# Patient Record
Sex: MALE | Race: WHITE | NOT HISPANIC OR LATINO | Employment: OTHER | URBAN - NONMETROPOLITAN AREA
[De-identification: names, ages, dates, MRNs, and addresses within clinical notes are randomized per-mention and may not be internally consistent; named-entity substitution may affect disease eponyms.]

---

## 2017-01-07 ENCOUNTER — COMMUNICATION - GICH (OUTPATIENT)
Dept: FAMILY MEDICINE | Facility: OTHER | Age: 62
End: 2017-01-07

## 2017-01-07 DIAGNOSIS — I25.10 ATHEROSCLEROTIC HEART DISEASE OF NATIVE CORONARY ARTERY WITHOUT ANGINA PECTORIS: ICD-10-CM

## 2017-01-25 ENCOUNTER — COMMUNICATION - GICH (OUTPATIENT)
Dept: FAMILY MEDICINE | Facility: OTHER | Age: 62
End: 2017-01-25

## 2017-01-25 DIAGNOSIS — I10 ESSENTIAL (PRIMARY) HYPERTENSION: ICD-10-CM

## 2017-01-25 DIAGNOSIS — E78.5 HYPERLIPIDEMIA: ICD-10-CM

## 2017-10-19 ENCOUNTER — COMMUNICATION - GICH (OUTPATIENT)
Dept: FAMILY MEDICINE | Facility: OTHER | Age: 62
End: 2017-10-19

## 2017-10-19 DIAGNOSIS — I10 ESSENTIAL (PRIMARY) HYPERTENSION: ICD-10-CM

## 2017-10-19 DIAGNOSIS — E78.5 HYPERLIPIDEMIA: ICD-10-CM

## 2017-12-14 ENCOUNTER — COMMUNICATION - GICH (OUTPATIENT)
Dept: FAMILY MEDICINE | Facility: OTHER | Age: 62
End: 2017-12-14

## 2017-12-14 DIAGNOSIS — I25.10 ATHEROSCLEROTIC HEART DISEASE OF NATIVE CORONARY ARTERY WITHOUT ANGINA PECTORIS: ICD-10-CM

## 2017-12-20 ENCOUNTER — OFFICE VISIT - GICH (OUTPATIENT)
Dept: FAMILY MEDICINE | Facility: OTHER | Age: 62
End: 2017-12-20

## 2017-12-20 ENCOUNTER — HISTORY (OUTPATIENT)
Dept: FAMILY MEDICINE | Facility: OTHER | Age: 62
End: 2017-12-20

## 2017-12-20 DIAGNOSIS — E78.5 HYPERLIPIDEMIA: ICD-10-CM

## 2017-12-20 DIAGNOSIS — Z23 ENCOUNTER FOR IMMUNIZATION: ICD-10-CM

## 2017-12-20 DIAGNOSIS — R73.01 IMPAIRED FASTING GLUCOSE: ICD-10-CM

## 2017-12-20 DIAGNOSIS — I10 ESSENTIAL (PRIMARY) HYPERTENSION: ICD-10-CM

## 2017-12-20 DIAGNOSIS — Z00.00 ENCOUNTER FOR GENERAL ADULT MEDICAL EXAMINATION WITHOUT ABNORMAL FINDINGS: ICD-10-CM

## 2017-12-20 DIAGNOSIS — I25.10 ATHEROSCLEROTIC HEART DISEASE OF NATIVE CORONARY ARTERY WITHOUT ANGINA PECTORIS: ICD-10-CM

## 2017-12-20 LAB
A/G RATIO - HISTORICAL: 1.7 (ref 1–2)
ALBUMIN SERPL-MCNC: 4.3 G/DL (ref 3.5–5.7)
ALP SERPL-CCNC: 57 IU/L (ref 34–104)
ALT (SGPT) - HISTORICAL: 42 IU/L (ref 7–52)
ANION GAP - HISTORICAL: 7 (ref 5–18)
AST SERPL-CCNC: 30 IU/L (ref 13–39)
BILIRUB SERPL-MCNC: 0.8 MG/DL (ref 0.3–1)
BUN SERPL-MCNC: 24 MG/DL (ref 7–25)
BUN/CREAT RATIO - HISTORICAL: 26
CALCIUM SERPL-MCNC: 9.1 MG/DL (ref 8.6–10.3)
CHLORIDE SERPLBLD-SCNC: 104 MMOL/L (ref 98–107)
CHOL/HDL RATIO - HISTORICAL: 2.93
CHOLESTEROL TOTAL: 135 MG/DL
CO2 SERPL-SCNC: 26 MMOL/L (ref 21–31)
CREAT SERPL-MCNC: 0.93 MG/DL (ref 0.7–1.3)
GFR IF NOT AFRICAN AMERICAN - HISTORICAL: >60 ML/MIN/1.73M2
GLOBULIN - HISTORICAL: 2.5 G/DL (ref 2–3.7)
GLUCOSE SERPL-MCNC: 117 MG/DL (ref 70–105)
HDLC SERPL-MCNC: 46 MG/DL (ref 23–92)
LDLC SERPL CALC-MCNC: 69 MG/DL
NON-HDL CHOLESTEROL - HISTORICAL: 89 MG/DL
POTASSIUM SERPL-SCNC: 4 MMOL/L (ref 3.5–5.1)
PROT SERPL-MCNC: 6.8 G/DL (ref 6.4–8.9)
PROVIDER ORDERDED STATUS - HISTORICAL: NORMAL
PSA TOTAL (DIAGNOSTIC) - HISTORICAL: 0.95 NG/ML
SODIUM SERPL-SCNC: 137 MMOL/L (ref 133–143)
TRIGL SERPL-MCNC: 98 MG/DL

## 2017-12-24 ENCOUNTER — COMMUNICATION - GICH (OUTPATIENT)
Dept: FAMILY MEDICINE | Facility: OTHER | Age: 62
End: 2017-12-24

## 2017-12-28 NOTE — TELEPHONE ENCOUNTER
Patient Information     Patient Name MRN Sex Kirk Rock [id number] Male 1955      Telephone Encounter by Matt Pearson RN at 10/23/2017 10:10 AM     Author:  Matt Pearson RN Service:  (none) Author Type:  NURS- Registered Nurse     Filed:  10/23/2017 10:41 AM Encounter Date:  10/19/2017 Status:  Signed     :  Matt Pearson RN (NURS- Registered Nurse)            Diuretic Combinations    Office visit in the past 12 months or per provider note.    Last visit with KAREN CRYSTAL was on: 2016 in [facility]  Next visit with KAREN CRYSTAL is on: No future appointment listed with this provider  Next visit with Family Practice is on: No future appointment listed in this department    Lab test requirements:  Creatinine and Potassium annually, if ordering lab, order BMP.  CREATININE (mg/dL)    Date Value   2016 0.97     POTASSIUM (mmol/L)    Date Value   2016 4.4     Max refill for 12 months from last office visit or per provider note.    Statins    Office visit in the past 12 months.    Last visit with KAREN CRYSTAL was on: 2016 in [facility]  Next visit with KAREN CRYSTAL is on: No future appointment listed with this provider  Next visit with Family Practice is on: No future appointment listed in this department    Last Lipids:  Chol: 136    2016  T    2016  HDL:   49    2016  LDL:  67    2016  LDL DIRECT:  No results found in past 5 years.    Max refills 12 months from last office visit.    Chart review shows that patient is coming due for an annual physical with PCP. Last physical, as well as last appointment with PCP is noted to be on 16. Patient is also coming due for annual labs. No appointment is noted to be scheduled at this time. Writer will refill rx as requested at this time for a limited supply, and will send patient a reminder letter/MyChart message.    Prescription refilled per  RN Medication Refill Policy.................... Matt Pearson RN ....................  10/23/2017   10:14 AM

## 2017-12-29 ENCOUNTER — AMBULATORY - GICH (OUTPATIENT)
Dept: LAB | Facility: OTHER | Age: 62
End: 2017-12-29

## 2017-12-29 DIAGNOSIS — R73.01 IMPAIRED FASTING GLUCOSE: ICD-10-CM

## 2017-12-29 LAB
ESTIMATED AVERAGE GLUCOSE: 126 MG/DL
HEMOGLOBIN A1C MONITORING (POCT) - HISTORICAL: 6 % (ref 4–6.2)

## 2018-01-02 NOTE — TELEPHONE ENCOUNTER
Patient Information     Patient Name MRN Kirk Garcia [id number] Male 1955      Telephone Encounter by Matt Pearson RN at 2017 10:00 AM     Author:  Matt Pearson RN Service:  (none) Author Type:  NURS- Registered Nurse     Filed:  2017 10:02 AM Encounter Date:  2017 Status:  Signed     :  Matt Pearson RN (NURS- Registered Nurse)            Redundant Refill Request for Metoprolol refused;    Prescribing Provider: Rakesh Crystal MD           Order Date: 2016  Ordered by: RAKESH CRYSTAL  Medication:metoprolol succinate (TOPROL XL) 25 mg Sustained-Release tablet    Qty:90 tablet   Ref:3  Start:2016  End:              Route:Oral                  JAI:No   Class:eRx    Sig:Take 1 tablet by mouth once daily.    Pharmacy:[place] [id number] [place] [address]    Unable to complete prescription refill per RN Medication Refill Policy.................... Matt Pearson RN ....................  2017   10:01 AM

## 2018-01-03 NOTE — TELEPHONE ENCOUNTER
Patient Information     Patient Name MRN Sex Kirk Rock [id number] Male 1955      Telephone Encounter by Kiana Jesus RN at 2017  1:35 PM     Author:  Kiana Jesus RN Service:  (none) Author Type:  NURS- Registered Nurse     Filed:  2017  1:39 PM Encounter Date:  2017 Status:  Signed     :  Kiana Jesus RN (NURS- Registered Nurse)            Diuretic Combinations    Office visit in the past 12 months or per provider note.    Last visit with KAREN CRYSTAL was on: 2016 in [facility]  Next visit with KAREN CRYSTAL is on: No future appointment listed with this provider  Next visit with Family Practice is on: No future appointment listed in this department    Lab test requirements:  Creatinine and Potassium annually, if ordering lab, order BMP.  CREATININE (mg/dL)    Date Value   2016 0.97     POTASSIUM (mmol/L)    Date Value   2016 4.4       Max refill for 12 months from last office visit or per provider note.    Statins    Office visit in the past 12 months.    Last Lipids:  Chol: 136    2016  T    2016  HDL:   49    2016  LDL:  67    2016  LDL DIRECT:  No results found in past 5 years    .    Max refills 12 months from last office visit.    Prescription refilled per RN Medication Refill Policy.................... Kiana Jesus RN ....................  2017   1:38 PM

## 2018-01-24 ENCOUNTER — HISTORY (OUTPATIENT)
Dept: FAMILY MEDICINE | Facility: OTHER | Age: 63
End: 2018-01-24

## 2018-01-24 ENCOUNTER — OFFICE VISIT - GICH (OUTPATIENT)
Dept: FAMILY MEDICINE | Facility: OTHER | Age: 63
End: 2018-01-24

## 2018-01-24 DIAGNOSIS — R73.03 PREDIABETES: ICD-10-CM

## 2018-02-01 ENCOUNTER — COMMUNICATION - GICH (OUTPATIENT)
Dept: FAMILY MEDICINE | Facility: OTHER | Age: 63
End: 2018-02-01

## 2018-02-01 ENCOUNTER — DOCUMENTATION ONLY (OUTPATIENT)
Dept: FAMILY MEDICINE | Facility: OTHER | Age: 63
End: 2018-02-01

## 2018-02-01 DIAGNOSIS — R73.03 PREDIABETES: ICD-10-CM

## 2018-02-01 PROBLEM — F52.8 PSYCHOSEXUAL DYSFUNCTION WITH INHIBITED SEXUAL EXCITEMENT: Status: ACTIVE | Noted: 2018-02-01

## 2018-02-01 PROBLEM — E78.00 HYPERCHOLESTEROLEMIA: Status: ACTIVE | Noted: 2018-02-01

## 2018-02-01 RX ORDER — LISINOPRIL AND HYDROCHLOROTHIAZIDE 20; 25 MG/1; MG/1
1 TABLET ORAL DAILY
COMMUNITY
Start: 2017-12-20 | End: 2019-03-06

## 2018-02-01 RX ORDER — ASPIRIN 81 MG/1
81 TABLET ORAL
COMMUNITY
Start: 2014-12-23 | End: 2023-05-08

## 2018-02-01 RX ORDER — METOPROLOL SUCCINATE 25 MG/1
25 TABLET, EXTENDED RELEASE ORAL DAILY
COMMUNITY
Start: 2017-12-20 | End: 2019-03-06

## 2018-02-01 RX ORDER — ATORVASTATIN CALCIUM 80 MG/1
80 TABLET, FILM COATED ORAL DAILY
COMMUNITY
Start: 2017-12-20 | End: 2019-03-06

## 2018-02-01 RX ORDER — NITROGLYCERIN 0.4 MG/1
0.4 TABLET SUBLINGUAL EVERY 5 MIN PRN
COMMUNITY
Start: 2016-11-18 | End: 2018-05-22

## 2018-02-09 VITALS
SYSTOLIC BLOOD PRESSURE: 138 MMHG | BODY MASS INDEX: 35.15 KG/M2 | WEIGHT: 252 LBS | HEART RATE: 60 BPM | DIASTOLIC BLOOD PRESSURE: 86 MMHG

## 2018-02-09 VITALS
WEIGHT: 248 LBS | DIASTOLIC BLOOD PRESSURE: 84 MMHG | HEART RATE: 60 BPM | BODY MASS INDEX: 34.72 KG/M2 | SYSTOLIC BLOOD PRESSURE: 136 MMHG | HEIGHT: 71 IN

## 2018-02-12 NOTE — NURSING NOTE
Patient Information     Patient Name MRN Kirk Garcia [id number] Male 1955      Nursing Note by Mei Adhikari at 2017  8:00 AM     Author:  Mei Adhikari Service:  (none) Author Type:  (none)     Filed:  2017  8:25 AM Encounter Date:  2017 Status:  Signed     :  Mei Adhikari            Patient comes in to the clinic today for his annual physical.

## 2018-02-12 NOTE — TELEPHONE ENCOUNTER
Patient Information     Patient Name MRN Kirk Garcia [id number] Male 1955      Telephone Encounter by Matt Pearson RN at 2017  4:37 PM     Author:  Matt Pearson RN Service:  (none) Author Type:  NURS- Registered Nurse     Filed:  2017  4:40 PM Encounter Date:  2017 Status:  Signed     :  Matt Pearson RN (NURS- Registered Nurse)            Beta Blockers     Office visit in the past 12 months or per provider note.    Last visit with KAREN CRYSTAL was on: 2016 in [facility]  Next visit with KAREN CRYSTAL is on: 2017 in [facility]  Next visit with Family Practice is on: 2017 in [facility]    Max refill for 12 months from last office visit or per provider note.    Chart review shows that patient is overdue for annual office visit with PCP. Was sent a reminder letter/MyChart message with last medication refill on 10/19/17 as per chart review. Patient with appointment scheduled with PCP for 17 for a physical. Is due for a refill of rx as requested prior to that date. Writer will refill rx as requested for a limited supply at this time.    Prescription refilled per RN Medication Refill Policy.................... Matt Pearson RN ....................  2017   4:39 PM

## 2018-02-12 NOTE — PROGRESS NOTES
Patient Information     Patient Name MRN Sex Kirk Rock [id number] Male 1955      Progress Notes by Rakesh Escalante MD at 2017  8:00 AM     Author:  Rakesh Escalante MD Service:  (none) Author Type:  Physician     Filed:  2017 11:29 PM Encounter Date:  2017 Status:  Signed     :  Rakesh Escalante MD (Physician)            SUBJECTIVE:    Kirk John is a 62 y.o. male who presents for physical    HPI: Patient feels well.  He has no concerns.      PROBLEM LIST:  Patient Active Problem List     Diagnosis  Code     TICK BITE W57.XXXA     DEGENERATIVE DISC DISEASE [id number]     HYPERCHOLESTEROLEMIA E78.00     ERECTILE DYSFUNCTION F52.8     HIP PAIN M25.559     CAD (coronary artery disease) I25.10     Health care maintenance Z00.00     Diverticulosis of sigmoid colon K57.30     PAST MEDICAL HISTORY:  Past Medical History:     Diagnosis  Date     Abnormal stress test      CAD (coronary artery disease)      Chest pain      DJD (degenerative joint disease)      HTN (hypertension)      Hyperlipidemia      SURGICAL HISTORY:  Past Surgical History:      Procedure  Laterality Date     cardiac stents      drug eluting       CARPAL TUNNEL RELEASE      Right carpal tunnel sugery       COLONOSCOPY  6/3/05    Colonoscopy, normal.  F/u in 10 years       COLONOSCOPY  2015    10y FU       COLONOSCOPY DIAGNOSTIC  16    F/U        HIP REPLACEMENT  2007    Left total hip with Dr Graham Escalante       TONSIL AND ADENOIDECTOMY      T&A as a child         SOCIAL HISTORY:  Social History     Social History        Marital status:       Spouse name: N/A     Number of children:  N/A     Years of education:  N/A     Occupational History      Not on file.     Social History Main Topics          Smoking status:   Former Smoker      Quit date:  10/11/1984      Smokeless tobacco:   Never Used      Alcohol use   0.0 oz/week     0 Standard drinks or equivalent per week         Comment: 1 day       Drug use:   Not on file      Sexual activity:   Not on file      Other Topics   Concern      Service  No     Blood Transfusions  No     Caffeine Concern  Yes     3 cups of coffee daily      Occupational Exposure  No     Hobby Hazards  No     Sleep Concern  Yes     Trouble staying asleep last couple of months 1/15/16      Stress Concern  No     Weight Concern  Yes     Goal weight 225 lbs. 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  . 3 daughters and 4 grandkids.  Likes to hunt and fish.  Plays pickleball.             FAMILY HISTORY:  Family History       Problem   Relation Age of Onset     Heart Disease  Mother 70     CAD/MI        Heart Disease  Father      atherosclerotic peripheral vascular disease       Cancer  Father      Lung       Heart Disease  Maternal Grandfather      CAD       Heart Disease  Maternal Uncle      CAD       Heart Disease  Sister      Pacemaker       Other  No Family History      No diabetes and no cancers        CURRENT MEDICATIONS:   Current Outpatient Prescriptions       Medication  Sig Dispense Refill     aspirin (ECOTRIN) 81 mg enteric coated tablet Take 1 tablet by mouth once daily with a meal.  0     atorvastatin (LIPITOR) 80 mg tablet Take 1 tablet by mouth once daily. 90 tablet 3     fish oil-omega-3 fatty acids (FISH OIL) 1,200-360 mg cap Take 2 capsules by mouth once daily.         lisinopril-hydrochlorothiazide, 20-25 mg, (PRINZIDE, ZESTORETIC) 20-25 mg per tablet Take 1 tablet by mouth once daily. 90 tablet 3     metoprolol succinate (TOPROL XL) 25 mg Sustained-Release tablet Take 1 tablet by mouth once daily. 90 tablet 3     MULTIVITAMIN WITH MINERALS (MULTIVITAMIN & MINERAL FORMULA ORAL) Take 1 tablet by mouth.         nitroglycerin (NITROSTAT) 0.4 mg sublingual  "tablet Place 1 tablet under the tongue every 5 minutes if needed for Chest Pain. 1 Bottle 0     No current facility-administered medications for this visit.      Medications have been reviewed by me and are current to the best of my knowledge and ability.    ALLERGIES:  Review of patient's allergies indicates no known allergies.    REVIEW OF SYSTEMS:  General: denies any general problems.  Eyes: denies problems  Ears/Nose/Throat: denies problems  Cardiovascular: denies problems  Respiratory: denies problems  Gastrointestinal: denies problems  Genitourinary: denies problems  Musculoskeletal: denies problems  Skin: denies problems  Neurologic: denies problems  Psychiatric: denies problems  Endocrine: denies problems  Heme/Lymphatic: denies problems  Allergic/Immunologic: denies problems  PHQ Depression Screening 12/20/2017   Date of PHQ exam (doc flow) 12/20/2017   1. Lack of interest/pleasure 0 - Not at all   2. Feeling down/depressed 0 - Not at all   PHQ-2 TOTAL SCORE 0   Some recent data might be hidden       OBJECTIVE:  /84  Pulse 60  Ht 1.803 m (5' 11\")  Wt 112.5 kg (248 lb)  BMI 34.59 kg/m2  EXAM:   General Appearance: Pleasant, alert, appropriate appearance for age. No acute distress  Head Exam: Normal. Normocephalic, atraumatic.  Eye Exam:  Normal external eye, conjunctiva, lids, cornea. DARIUS.  Fundoscopic Exam: Normal red reflex and fundoscopic exam.  Ear Exam: Normal TM's bilaterally. Normal auditory canals and external ears. Non-tender.  Nose Exam: Normal external nose, mucus membranes, and septum.  OroPharynx Exam:  Dental hygiene adequate. Normal buccal mucosa. Normal pharynx.  Neck Exam:  Supple, no masses or nodes.  Thyroid Exam: No nodules or enlargement.  Chest/Respiratory Exam: Normal chest wall and respirations. Clear to auscultation.  Cardiovascular Exam: Regular rate and rhythm. S1, S2, no murmur, click, gallop, or rubs.  Gastrointestinal Exam: Soft, non-tender, no masses or " organomegaly.  Rectal Exam: Normal sphincter tone. No masses noted.  Prostate normal size.  Genitourinary Exam Male: Normal male genitalia.   Lymphatic Exam: Non-palpable nodes in neck, clavicular, axillary, or inguinal regions.  Musculoskeletal Exam: Back is straight and non-tender, full ROM of upper and lower extremities.  Foot Exam: Normal.  Skin: no rash or abnormalities  Neurologic Exam: Nonfocal, symmetric DTRs, normal gross motor, tone coordination and no tremor.  Psychiatric Exam: Alert and oriented - appropriate affect.    Results for orders placed or performed in visit on 12/20/17      PSA, TOTAL      Result  Value Ref Range    PSA TOTAL (DIAGNOSTIC) 0.950 <=3.100 ng/mL   COMPLETE METABOLIC PANEL      Result  Value Ref Range    SODIUM 137 133 - 143 mmol/L    POTASSIUM 4.0 3.5 - 5.1 mmol/L    CHLORIDE 104 98 - 107 mmol/L    CO2,TOTAL 26 21 - 31 mmol/L    ANION GAP 7 5 - 18                    GLUCOSE 117 (H) 70 - 105 mg/dL    CALCIUM 9.1 8.6 - 10.3 mg/dL    BUN 24 7 - 25 mg/dL    CREATININE 0.93 0.70 - 1.30 mg/dL    BUN/CREAT RATIO           26                    GFR if African American >60 >60 ml/min/1.73m2    GFR if not African American >60 >60 ml/min/1.73m2    ALBUMIN 4.3 3.5 - 5.7 g/dL    PROTEIN,TOTAL 6.8 6.4 - 8.9 g/dL    GLOBULIN                  2.5 2.0 - 3.7 g/dL    A/G RATIO 1.7 1.0 - 2.0                    BILIRUBIN,TOTAL 0.8 0.3 - 1.0 mg/dL    ALK PHOSPHATASE 57 34 - 104 IU/L    ALT (SGPT) 42 7 - 52 IU/L    AST (SGOT) 30 13 - 39 IU/L   LIPID PANEL      Result  Value Ref Range    CHOLESTEROL,TOTAL 135 <200 mg/dL    TRIGLYCERIDES 98 <150 mg/dL    HDL CHOLESTEROL 46 23 - 92 mg/dL    NON-HDL CHOLESTEROL 89 <145 mg/dl    CHOL/HDL RATIO            2.93 <4.50                    LDL CHOLESTEROL 69 <100 mg/dL    PROVIDER ORDERED STATUS FASTING          ASSESSMENT/PLAN:    ICD-10-CM    1. Physical exam Z00.00 PSA, TOTAL      LIPID PANEL      PSA, TOTAL      LIPID PANEL - PSA reassuring.  Lipid panel  excellent.  Continue current medications.   2. Hyperlipidemia, unspecified hyperlipidemia type E78.5 atorvastatin (LIPITOR) 80 mg tablet   3. HTN (hypertension) I10 lisinopril-hydrochlorothiazide, 20-25 mg, (PRINZIDE, ZESTORETIC) 20-25 mg per tablet - BMP normal but does have increased glucose, suggest low carbohydrate diet and recheck of A1c.      COMPLETE METABOLIC PANEL      COMPLETE METABOLIC PANEL   4. Coronary artery disease, angina presence unspecified, unspecified vessel or lesion type, unspecified whether native or transplanted heart I25.10 metoprolol succinate (TOPROL XL) 25 mg Sustained-Release tablet   5. Needfor vaccination for zoster Z23 OMNI ZOSTER VACCINE LIVE SQ      MD ADMIN EA ADDL VACC   6. Need for prophylactic vaccination and inoculation against influenza Z23 FLU VACCINE => 3 YRS PF QUADRIVALENT IIV4 IM      MD ADMIN VACC INITIAL     BP Readings from Last 1 Encounters:12/20/17 : 136/84  Mr. John's blood pressure is out of the normal range for adults. Per JNC-8 guidelines normal adult blood pressure is < 120/80, pre-hypertensive is between 120/80 and 139/89, and hypertension is 140/90 or greater. Risks of hypertension were discussed. Patient's strategy will be weight loss and reduced sodium intake

## 2018-02-13 NOTE — PROGRESS NOTES
Patient Information     Patient Name MRN Sex Kirk Rock [id number] Male 1955      Progress Notes by Rakesh Escalante MD at 2018  9:00 AM     Author:  Rakesh Escalante MD Service:  (none) Author Type:  Physician     Filed:  2018  8:15 AM Encounter Date:  2018 Status:  Signed     :  Rakesh Escalante MD (Physician)            Nursing Notes:   Mei Adhikari  2018  9:10 AM  Signed  Patient comes in to the clinic today to follow up on his blood sugar and A1C.      SUBJECTIVE:  Kirk John is a 62 y.o. Male.  He comes in today to follow-up on elevated blood sugar and subsequent elevated A1c. Patient reports that he feels well but admits that he has gained some weight this winter and has not been eating very healthy. Since receiving the news he has cut back dramatically on all simple carbohydrates. He denies any problems with his vision. No chest pain, shortness of breath or anginal equivalents. He does have a history of coronary artery disease been has not had recurrent symptoms. Exercise tolerance is good. No problems with his feet.      OBJECTIVE:  /86  Pulse 60  Wt 114.3 kg (252 lb)  BMI 35.15 kg/m2  EXAM:  General Appearance: Pleasant, alert, appropriate appearance for age. No acute distress  Funduscopic Exam: Normal red reflex and fundoscopic exam.  Thyroid Exam: No nodules or enlargement.  Chest/Respiratory Exam: Normal chest wall and respirations. Clear to auscultation.  Cardiovascular Exam: Regular rate and rhythm. S1, S2, no murmur, click, gallop, or rubs.    Results for orders placed or performed in visit on 17      Hgb A1c      Result  Value Ref Range    HEMOGLOBIN A1C MONITORING (POCT) 6.0 4.0 - 6.2 %    ESTIMATED AVERAGE GLUCOSE  126 mg/dL       ASSESSMENT/Plan :      Kirk was seen today for follow up.    Diagnoses and all orders for this visit:    Prediabetes  discussed with patient pathogenesis of diabetes. This was likely caught early  as his A1c is 6.0. Explained that he is currently in the prediabetic range but without significant lifestyle intervention will likely develop diabetes within the next few years. Discussed with him the role Adipost tissue comply on insulin resistance which is the primary  for type 2 diabetes. Strongly suggest reduction in simple carbohydrates as well as overall calorie reduction for goal weight loss of 25 pounds over the next 6-12 months. He is optimistic that he can do this as [place] started to improve his diet and exercise. Discussed dramatic benefit that daily cardiovascular exercise can have on health as well as glycemic control. We'll plan on rechecking patient's hemoglobin A1c in April. If not improved would suggest adding metformin. Discussed with him how this medication works as well.  -     blood-glucose meter; Dispense meter, test strips, lancets covered by pt ins. E11.9 NIDDM type II - Test 1 time/day        There are no Patient Instructions on file for this visit.    Rakesh Escalante MD    This document was created using computer generated templates and voice activated software.

## 2018-02-13 NOTE — TELEPHONE ENCOUNTER
Patient Information     Patient Name MRN Kirk Garcia [id number] Male 1955      Telephone Encounter by Marianne Navas at 2018  8:56 AM     Author:  Marianne Navas Service:  (none) Author Type:  (none)     Filed:  2018  8:58 AM Encounter Date:  2018 Status:  Signed     :  Marianne Navas stated he talked to you about seeing a dietitian.  He states he is now ready to schedule to see the dietitian.  Kirk states he may need a referral to see the dietitian.  Can you please place a referral to the dietitian.  Thanks!  Marianne Navas ....................  2018   8:57 AM

## 2018-02-13 NOTE — NURSING NOTE
Patient Information     Patient Name MRN Kirk Garcia [id number] Male 1955      Nursing Note by Mei Adhikari at 2018  9:00 AM     Author:  Mei Adhikari Service:  (none) Author Type:  (none)     Filed:  2018  9:10 AM Encounter Date:  2018 Status:  Signed     :  Mei Adhikari            Patient comes in to the clinic today to follow up on his blood sugar and A1C.

## 2018-03-08 ENCOUNTER — OFFICE VISIT (OUTPATIENT)
Dept: FAMILY MEDICINE | Facility: OTHER | Age: 63
End: 2018-03-08
Attending: DIETITIAN, REGISTERED
Payer: COMMERCIAL

## 2018-03-08 PROCEDURE — 97802 MEDICAL NUTRITION INDIV IN: CPT | Performed by: DIETITIAN, REGISTERED

## 2018-03-08 NOTE — PROGRESS NOTES
"[facility]  Medical Nutrition Therapy    Visit Type: Initial Assessment    Kirk John referred by Dr. Escalante for MNT related to Pre-DM  He has glucometer with him today. Results .     Nutrition Assessment:  Anthropometrics  Height: .   5'11\" BMI:     35   Weight:    BSA:      IBW (kg):  Female:   Male:  Weight loss of 7% recommended. Recommended BMT:            Nutrition History   Kirk has been tracking his intake for one month. Pattern of eating 2-3 meals per day.  Largest meal in the evening. He eats all food groups. Skim milk 2 cups per day. Water increasing.  Eats more convenient foods now as he is retired and his wife is still working.        Physical Activity     Walks 45 minutes 2-3 times per week. Plays Chamelic ball 2-3 x per week.    Nutrition Prescription  Energy:      2,000   Protein:      80 grams      Fluid:      85 oz. Per day   Fat:      65 grams per day    Carbohydrate:         30-45 grams per meal Fiber:      25 grams         Micronutrient:      Add 2,000 iu vit D      Takes MVI               Food Record      Reviewed      Nutrition Diagnosis:   Pre-Diabetes     Nutrition Intervention:  Reviewed CHO counting and portion control.  Reviewed meal ideas and sample recipes and snacks.  Discussed portion control.  Pt. Verbalized understanding.     Nutrition Goals:    (1) CHO controlled diet 30-45 grams per meal  (2) 150-200 min of exercise per week.    Nutrition Follow Up / Monitoring:   F/u A1C in 1 month    Nutrition Recommendations:     Add Vit D. 2,000 iu per day    Time spent: 45 min  Patient to follow-up with RD in 15 weeks.  Patient has RD contact information to call/email if needed.    KRISTIN K. KLINEFELTER on 3/8/2018 at 12:22 PM                  "

## 2018-03-08 NOTE — MR AVS SNAPSHOT
MRN:[id number]                      After Visit Summary   3/8/2018    Kirk John    MRN: [id number]           Visit Information        Provider Department      3/8/2018 11:30 AM [facility] NUTRITIONIST [facility] Information     [facility] gives you secure access to your electronic health record. If you see a primary care provider, you can also send messages to your care team and make appointments. If you have questions, please call your primary care clinic.  If you do not have a primary care provider, please call [phone] and they will assist you.        Care EveryWhere ID     This is your Care EveryWhere ID. This could be used by other organizations to access your [facility] medical records  [id number]        Equal Access to Services     HANNAH SALGUERO : Mone Olmstead, suresh roberson, chey gan, orlando luna. So [facility] [phone].    ATENCIÓN: Si habla español, tiene a clements disposición servicios gratuitos de asistencia lingüística. Llame al [phone].    We comply with applicable federal civil rights laws and [place] laws. We do not discriminate on the basis of race, color, national origin, age, disability, sex, sexual orientation, or gender identity.

## 2018-03-20 DIAGNOSIS — E11.9 TYPE 2 DIABETES MELLITUS WITHOUT COMPLICATION, WITHOUT LONG-TERM CURRENT USE OF INSULIN (H): Primary | ICD-10-CM

## 2018-04-05 DIAGNOSIS — E11.9 TYPE 2 DIABETES MELLITUS WITHOUT COMPLICATION, WITHOUT LONG-TERM CURRENT USE OF INSULIN (H): ICD-10-CM

## 2018-04-05 LAB — HBA1C MFR BLD: 5.8 % (ref 4–6)

## 2018-04-05 PROCEDURE — 36415 COLL VENOUS BLD VENIPUNCTURE: CPT | Performed by: FAMILY MEDICINE

## 2018-04-05 PROCEDURE — 83036 HEMOGLOBIN GLYCOSYLATED A1C: CPT | Performed by: FAMILY MEDICINE

## 2018-04-21 DIAGNOSIS — R73.03 PREDIABETES: Primary | ICD-10-CM

## 2018-04-25 PROBLEM — R73.03 PREDIABETES: Status: ACTIVE | Noted: 2018-02-01

## 2018-04-25 NOTE — TELEPHONE ENCOUNTER
Prescription approved per [facility].    LOV 1/24/18  Lida Hung RN on 4/25/2018 at 11:19 AM

## 2018-05-22 DIAGNOSIS — I25.10 ATHEROSCLEROSIS OF CORONARY ARTERY, ANGINA PRESENCE UNSPECIFIED, UNSPECIFIED VESSEL OR LESION TYPE, UNSPECIFIED WHETHER NATIVE OR TRANSPLANTED HEART: Primary | ICD-10-CM

## 2018-05-23 RX ORDER — NITROGLYCERIN 0.4 MG/1
TABLET SUBLINGUAL
Qty: 25 TABLET | Refills: 0 | Status: SHIPPED | OUTPATIENT
Start: 2018-05-23 | End: 2018-06-20

## 2018-05-23 RX ORDER — INSULIN PUMP SYRINGE, 3 ML
EACH MISCELLANEOUS
COMMUNITY
Start: 2018-01-24 | End: 2020-11-18

## 2018-06-15 ENCOUNTER — DOCUMENTATION ONLY (OUTPATIENT)
Dept: OTHER | Facility: CLINIC | Age: 63
End: 2018-06-15

## 2018-06-15 PROBLEM — Z71.89 ADVANCED DIRECTIVES, COUNSELING/DISCUSSION: Chronic | Status: ACTIVE | Noted: 2018-06-15

## 2018-06-20 DIAGNOSIS — I25.10 ATHEROSCLEROSIS OF CORONARY ARTERY, ANGINA PRESENCE UNSPECIFIED, UNSPECIFIED VESSEL OR LESION TYPE, UNSPECIFIED WHETHER NATIVE OR TRANSPLANTED HEART: ICD-10-CM

## 2018-06-20 RX ORDER — NITROGLYCERIN 0.4 MG/1
TABLET SUBLINGUAL
Qty: 25 TABLET | Refills: 0 | Status: SHIPPED | OUTPATIENT
Start: 2018-06-20 | End: 2020-09-02

## 2018-07-24 NOTE — PROGRESS NOTES
Patient Information     Patient Name  Kirk John MRN  [id number] Sex  Male   1955      Letter by Rakesh Escalante MD at      Author:  Rakesh Escalante MD Service:  (none) Author Type:  (none)    Filed:   Encounter Date:  10/19/2017 Status:  (Other)           Kirk John  [address] [facility] [address]          2017    Dear Mr. John:    This is to remind you that you are coming due for your annual physical appointment with Rakesh Escalante MD as well as annual labs. Your last visit was on 16. Additional refills of your medication require you to complete this visit.    Please call [phone] to schedule your appointment.    Thank you for choosing [facility] for your health care needs.    Sincerely,      Refill RN  [facility]

## 2018-07-24 NOTE — PROGRESS NOTES
Patient Information     Patient Name  Kirk John MRN  [id number] Sex  Male   1955      Letter by Rakesh Escalante MD at      Author:  Rakesh Escalante MD Service:  (none) Author Type:  (none)    Filed:   Encounter Date:  2017 Status:  (Other)           Kirk John  [address] [facility] [address]          2017    Dear Mr. John:    When completing your documentation I did notice that your blood sugar was a bit high.  I apologize that I did not include this in the letter I sent on .  I would like you to come in to the [facility] for one more blood test called an A1c.  You do not need to be fasting for it.  I will let you know results when I return the first week of January.      If you have any further questions or problems contact my office and talk with Mei Adhikari or Lorraine Lauren.    Thank you,    Rakesh Escalante MD

## 2018-07-24 NOTE — PROGRESS NOTES
Patient Information     Patient Name  Kirk John MRN  [id number] Sex  Male   1955      Letter by Rakesh Escalante MD at      Author:  Rakesh Escalante MD Service:  (none) Author Type:  (none)    Filed:   Encounter Date:  2017 Status:  (Other)         Kirk John  [address] [facility] [address]    2017      Dear Mr. John,      We've gotten results back from the laboratory for the samples you gave in clinic.  Things look great! Contact us with any questions.    I'm sending along your actual numbers so that you will have them for your general interest and your records.       Take Care,   Rakesh Escalante MD      Resulted Orders      PSA, TOTAL (Collected: 2017  8:47 AM)      Result  Value Ref Range    PSA TOTAL (DIAGNOSTIC) 0.950 <=3.100 ng/mL    Narrative      The percentage of Free PSA can be used to enhance the   differentiation of prostate cancer from benign prostatic  disease in subjects whose PSA levels are between 4.00 and  10.00 ng/mL.      The DXI PSA assay is a Chemiluminescent Assay.  Assay values obtained with different assay   methods cannot be used interchangeably due to differences  in assay methods and reagent specificity.       COMPLETE METABOLIC PANEL (Collected: 2017  8:47 AM)      Result  Value Ref Range    SODIUM 137 133 - 143 mmol/L    POTASSIUM 4.0 3.5 - 5.1 mmol/L    CHLORIDE 104 98 - 107 mmol/L    CO2,TOTAL 26 21 - 31 mmol/L    ANION GAP 7 5 - 18                    GLUCOSE 117 (H) 70 - 105 mg/dL    CALCIUM 9.1 8.6 - 10.3 mg/dL    BUN 24 7 - 25 mg/dL    CREATININE 0.93 0.70 - 1.30 mg/dL    BUN/CREAT RATIO           26                    GFR if African American >60 >60 ml/min/1.73m2    GFR if not African American >60 >60 ml/min/1.73m2    ALBUMIN 4.3 3.5 - 5.7 g/dL    PROTEIN,TOTAL 6.8 6.4 - 8.9 g/dL    GLOBULIN                  2.5 2.0 - 3.7 g/dL    A/G RATIO 1.7 1.0 - 2.0                    BILIRUBIN,TOTAL 0.8 0.3 - 1.0 mg/dL     ALK PHOSPHATASE 57 34 - 104 IU/L    ALT (SGPT) 42 7 - 52 IU/L    AST (SGOT) 30 13 - 39 IU/L   LIPID PANEL (Collected: 12/20/2017  8:47 AM)      Result  Value Ref Range    CHOLESTEROL,TOTAL 135 <200 mg/dL    TRIGLYCERIDES 98 <150 mg/dL    HDL CHOLESTEROL 46 23 - 92 mg/dL    NON-HDL CHOLESTEROL 89 <145 mg/dl    CHOL/HDL RATIO            2.93 <4.50                    LDL CHOLESTEROL 69 <100 mg/dL    PROVIDER ORDERED STATUS FASTING

## 2019-02-19 ENCOUNTER — MYC MEDICAL ADVICE (OUTPATIENT)
Dept: FAMILY MEDICINE | Facility: OTHER | Age: 64
End: 2019-02-19

## 2019-03-06 ENCOUNTER — OFFICE VISIT (OUTPATIENT)
Dept: FAMILY MEDICINE | Facility: OTHER | Age: 64
End: 2019-03-06
Attending: FAMILY MEDICINE
Payer: COMMERCIAL

## 2019-03-06 VITALS
BODY MASS INDEX: 34.86 KG/M2 | HEART RATE: 56 BPM | HEIGHT: 71 IN | DIASTOLIC BLOOD PRESSURE: 84 MMHG | RESPIRATION RATE: 18 BRPM | TEMPERATURE: 97.9 F | WEIGHT: 249 LBS | SYSTOLIC BLOOD PRESSURE: 132 MMHG

## 2019-03-06 DIAGNOSIS — R73.03 PREDIABETES: ICD-10-CM

## 2019-03-06 DIAGNOSIS — Z00.00 PHYSICAL EXAM: ICD-10-CM

## 2019-03-06 DIAGNOSIS — E78.5 HYPERLIPIDEMIA LDL GOAL <130: ICD-10-CM

## 2019-03-06 DIAGNOSIS — Z12.5 SCREENING FOR PROSTATE CANCER: ICD-10-CM

## 2019-03-06 DIAGNOSIS — Z23 NEED FOR PROPHYLACTIC VACCINATION AND INOCULATION AGAINST INFLUENZA: ICD-10-CM

## 2019-03-06 DIAGNOSIS — I10 ESSENTIAL HYPERTENSION: Primary | ICD-10-CM

## 2019-03-06 DIAGNOSIS — E78.5 HYPERLIPIDEMIA LDL GOAL <100: ICD-10-CM

## 2019-03-06 LAB
ALBUMIN SERPL-MCNC: 4.5 G/DL (ref 3.5–5.7)
ALP SERPL-CCNC: 61 U/L (ref 34–104)
ALT SERPL W P-5'-P-CCNC: 59 U/L (ref 7–52)
ANION GAP SERPL CALCULATED.3IONS-SCNC: 5 MMOL/L (ref 3–14)
AST SERPL W P-5'-P-CCNC: 36 U/L (ref 13–39)
BILIRUB SERPL-MCNC: 0.8 MG/DL (ref 0.3–1)
BUN SERPL-MCNC: 26 MG/DL (ref 7–25)
CALCIUM SERPL-MCNC: 9.7 MG/DL (ref 8.6–10.3)
CHLORIDE SERPL-SCNC: 103 MMOL/L (ref 98–107)
CHOLEST SERPL-MCNC: 140 MG/DL
CO2 SERPL-SCNC: 29 MMOL/L (ref 21–31)
CREAT SERPL-MCNC: 1.15 MG/DL (ref 0.7–1.3)
GFR SERPL CREATININE-BSD FRML MDRD: 64 ML/MIN/{1.73_M2}
GLUCOSE SERPL-MCNC: 112 MG/DL (ref 70–105)
HBA1C MFR BLD: 6 % (ref 4–6)
HDLC SERPL-MCNC: 42 MG/DL (ref 23–92)
LDLC SERPL CALC-MCNC: 85 MG/DL
NONHDLC SERPL-MCNC: 98 MG/DL
POTASSIUM SERPL-SCNC: 4.6 MMOL/L (ref 3.5–5.1)
PROT SERPL-MCNC: 7.6 G/DL (ref 6.4–8.9)
PSA SERPL-ACNC: 1.16 NG/ML
SODIUM SERPL-SCNC: 137 MMOL/L (ref 134–144)
TRIGL SERPL-MCNC: 66 MG/DL

## 2019-03-06 PROCEDURE — 99396 PREV VISIT EST AGE 40-64: CPT | Mod: 25 | Performed by: FAMILY MEDICINE

## 2019-03-06 PROCEDURE — G0103 PSA SCREENING: HCPCS | Performed by: FAMILY MEDICINE

## 2019-03-06 PROCEDURE — 36415 COLL VENOUS BLD VENIPUNCTURE: CPT | Performed by: FAMILY MEDICINE

## 2019-03-06 PROCEDURE — 80061 LIPID PANEL: CPT | Performed by: FAMILY MEDICINE

## 2019-03-06 PROCEDURE — 90686 IIV4 VACC NO PRSV 0.5 ML IM: CPT | Performed by: FAMILY MEDICINE

## 2019-03-06 PROCEDURE — 80053 COMPREHEN METABOLIC PANEL: CPT | Performed by: FAMILY MEDICINE

## 2019-03-06 PROCEDURE — 90471 IMMUNIZATION ADMIN: CPT | Performed by: FAMILY MEDICINE

## 2019-03-06 PROCEDURE — 83036 HEMOGLOBIN GLYCOSYLATED A1C: CPT | Performed by: FAMILY MEDICINE

## 2019-03-06 RX ORDER — ATORVASTATIN CALCIUM 80 MG/1
80 TABLET, FILM COATED ORAL DAILY
Qty: 90 TABLET | Refills: 3 | Status: SHIPPED | OUTPATIENT
Start: 2019-03-06 | End: 2020-02-14

## 2019-03-06 RX ORDER — METOPROLOL SUCCINATE 25 MG/1
25 TABLET, EXTENDED RELEASE ORAL DAILY
Qty: 90 TABLET | Refills: 3 | Status: SHIPPED | OUTPATIENT
Start: 2019-03-06 | End: 2020-02-14

## 2019-03-06 RX ORDER — LISINOPRIL AND HYDROCHLOROTHIAZIDE 20; 25 MG/1; MG/1
1 TABLET ORAL DAILY
Qty: 90 TABLET | Refills: 3 | Status: SHIPPED | OUTPATIENT
Start: 2019-03-06 | End: 2020-02-14

## 2019-03-06 ASSESSMENT — MIFFLIN-ST. JEOR: SCORE: 1946.59

## 2019-03-06 NOTE — PROGRESS NOTES

## 2019-03-06 NOTE — NURSING NOTE
No LMP for male patient.  Medication Reconciliation: complete    Mei Adhikari LPN  3/6/2019 8:50 AM

## 2019-03-06 NOTE — LETTER
March 6, 2019      Kirk John  [address] [facility] [address]        Dear ,    We are writing to inform you of your test results.    Your cholesterol is excellent.  Your blood sugars are again minimally elevated but your A1c has only increased to 6.0.  At this level I would not add any new medications but would suggest that you continue to work on improving her diet.  Focus on vegetables and lean meats and stay away from simple carbohydrates such as breads, pastas and sweets.  Make sure you are getting regular exercise.    Your kidneys are working normally.  And your PSA is reassuring.    Resulted Orders   Comprehensive metabolic panel   Result Value Ref Range    Sodium 137 134 - 144 mmol/L    Potassium 4.6 3.5 - 5.1 mmol/L    Chloride 103 98 - 107 mmol/L    Carbon Dioxide 29 21 - 31 mmol/L    Anion Gap 5 3 - 14 mmol/L    Glucose 112 (H) 70 - 105 mg/dL    Urea Nitrogen 26 (H) 7 - 25 mg/dL    Creatinine 1.15 0.70 - 1.30 mg/dL    GFR Estimate 64 >60 mL/min/[1.73_m2]    GFR Estimate If Black 78 >60 mL/min/[1.73_m2]    Calcium 9.7 8.6 - 10.3 mg/dL    Bilirubin Total 0.8 0.3 - 1.0 mg/dL    Albumin 4.5 3.5 - 5.7 g/dL    Protein Total 7.6 6.4 - 8.9 g/dL    Alkaline Phosphatase 61 34 - 104 U/L    ALT 59 (H) 7 - 52 U/L    AST 36 13 - 39 U/L   Hemoglobin A1c   Result Value Ref Range    Hemoglobin A1C 6.0 4.0 - 6.0 %   PSA Screen GH   Result Value Ref Range    PSA Screen 1.160 <3.100 ng/mL   Lipid Profile   Result Value Ref Range    Cholesterol 140 <200 mg/dL    Triglycerides 66 <150 mg/dL    HDL Cholesterol 42 23 - 92 mg/dL    LDL Cholesterol Calculated 85 <100 mg/dL      Comment:      Desirable:       <100 mg/dl    Non HDL Cholesterol 98 <130 mg/dL       If you have any questions or concerns, please call the clinic at the number listed above.       Sincerely,        Rakesh Escalante MD

## 2019-03-07 NOTE — PROGRESS NOTES
Nursing Notes:   Mei Adhikari LPN  3/6/2019  8:51 AM  Signed  Patient comes in to the clinic today for an annual physical.    Mei Adhikari LPN  3/6/2019  8:51 AM  Signed  No LMP for male patient.  Medication Reconciliation: complete    Mei Adhikari LPN  3/6/2019 8:50 AM        SUBJECTIVE:  Kirk John is a 63 year old male who comes in today for physical/annual exam.  He reports that he feels well and does not really have any concerns.  He reports he went to [place] for 3-1/2 months last summer.  Was not checking his blood sugar regularly and admits that he was not eating very healthy.  Blood sugar slowly increased.  Now he has been returning to a heart healthy and low carbohydrate diet.  Blood sugars have ranged 112-120.  He has not had any chest pain or anginal equivalents.  No shortness of breath.  No problems with vision.  No issues with his feet.    He has had some pain predominantly in his PIP joints of his hands.  Affects all 4 fingers on both hands.  Feels painful and occasionally has a little bit of swelling.  Seems to be better if he moves them or ices them.  Does not seem to affect MCP joints or DIP joints.    Also has bunion on right foot.  Uncomfortable but not painful.  Wondering about over-the-counter treatments.    Patient Active Problem List   Diagnosis     Coronary atherosclerosis     Degeneration of intervertebral disc     Diverticulosis of sigmoid colon     Psychosexual dysfunction with inhibited sexual excitement     Health care maintenance     Hip pain     Hypercholesterolemia     Tick bite     Prediabetes     Advance Care Planning       Current Outpatient Medications   Medication Sig Dispense Refill     aspirin EC 81 MG EC tablet Take 81 mg by mouth daily with food       atorvastatin (LIPITOR) 80 MG tablet Take 1 tablet (80 mg) by mouth daily 90 tablet 3     blood glucose (ONETOUCH ULTRA) test strip TEST 1 TIME PER  strip 3     blood glucose monitoring (ONE TOUCH DELICA)  lancets Use to test blood sugar 1 time daily or as directed. 100 each 3     Blood Glucose Monitoring Suppl (FIFTY50 GLUCOSE METER 2.0) w/Device KIT Dispense meter, test strips, lancets covered by pt ins. E11.9 NIDDM type II - Test 1 time/day       lisinopril-hydrochlorothiazide (PRINZIDE/ZESTORETIC) 20-25 MG tablet Take 1 tablet by mouth daily 90 tablet 3     metoprolol succinate ER (TOPROL-XL) 25 MG 24 hr tablet Take 1 tablet (25 mg) by mouth daily 90 tablet 3     Multiple Vitamins-Minerals (MULTIVITAMIN & MINERAL PO) Take 1 tablet by mouth       nitroGLYcerin (NITROSTAT) 0.4 MG sublingual tablet PLACE 1 TABLET UNDER THE TONGUE EVERY 5 MINUTES IF NEEDED FOR CHEST PAIN. 25 tablet 0     Omega-3 Fatty Acids (FISH OIL PO) Take 2 capsules by mouth daily         Past Medical History:   Diagnosis Date     Abnormal result of other cardiovascular function study (CODE)     No Comments Provided     Atherosclerotic heart disease of native coronary artery without angina pectoris     No Comments Provided     Chest pain     No Comments Provided     Essential (primary) hypertension     No Comments Provided     Hyperlipidemia     No Comments Provided     Osteoarthritis     No Comments Provided       Past Surgical History:   Procedure Laterality Date     ARTHROPLASTY HIP      5/2007,Left total hip with Dr Graham Escalante     COLONOSCOPY      6/3/05,Colonoscopy, normal.  F/u in 10 years     COLONOSCOPY      1/25/16,F/U 2026     COLONOSCOPY      2015,10y FU     OTHER SURGICAL HISTORY      2012,932527,OTHER,drug eluting     RELEASE CARPAL TUNNEL      5/04,Right carpal tunnel sugery     TONSILLECTOMY, ADENOIDECTOMY, COMBINED      T&A as a child       Family History   Problem Relation Age of Onset     Heart Disease Mother 70        Heart Disease,CAD/MI     Heart Disease Father         Heart Disease,atherosclerotic peripheral vascular disease     Cancer Father         Cancer,Lung     Heart Disease Maternal Grandfather         Heart  "Disease,CAD     Heart Disease Maternal Uncle         Heart Disease,CAD     Heart Disease Sister         Heart Disease,Pacemaker     Other - See Comments No family hx of         No diabetes and no cancers       Social History     Social History Narrative    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  . 3 daughters and 4 grandkids.  Likes to hunt and fish.  Plays pickleball.       I have personally reviewed and updated above noted social, family and/or past medical history.    10 point ROS of systems including Constitutional, Eyes, ENT, Respiratory, Cardiovascular, Gastrointestinal, Genitourinary, Integumentary, Muscularskeletal, Psychiatric were all negative except for pertinent positives noted in my HPI.      /84   Pulse 56   Temp 97.9  F (36.6  C)   Resp 18   Ht 1.803 m (5' 11\")   Wt 112.9 kg (249 lb)   BMI 34.73 kg/m      Exam:  Constitutional: healthy, alert and no distress  HEENT:  NCAT, EOMI, PERRLA, TMs nl.  EAC nl.  OP/NP nl. Thyroid palpated without enlargement or nodularity.  No lymphadenopathy.  Cardiovascular: Regular rate and rhythm.  No murmurs rubs or gallops heard.   No JVD. No lower extremity edema.  Respiratory: Clear to ascultation bilaterally.  No increased respiratory effort.   Gastrointestinal: Abdomen Soft, non-tender.  No masses, rebound or guarding.   Msk: No synovitis.  Muscle strength age and body habitus appropriateas well as equal and even.  Mild arthritic changes of the PIP joints.  Neuro: Normal gait and balance.  CN2-12 in tact.  No loss of sensation. Reflexes are symmetric.      Skin: No changing moles or concerning lesion.  No rashes.  No abnormal bruising.  Lymph:  No lymphadenopathy in axillae, supraclavicular or inguinal regions  Psychiatric: mentation appears normal and affect normal/bright      ASSESSMENT/Plan :    I personally reviewed the patients' labs    Results for orders placed or performed in visit on 03/06/19   Comprehensive metabolic panel   Result " Value Ref Range    Sodium 137 134 - 144 mmol/L    Potassium 4.6 3.5 - 5.1 mmol/L    Chloride 103 98 - 107 mmol/L    Carbon Dioxide 29 21 - 31 mmol/L    Anion Gap 5 3 - 14 mmol/L    Glucose 112 (H) 70 - 105 mg/dL    Urea Nitrogen 26 (H) 7 - 25 mg/dL    Creatinine 1.15 0.70 - 1.30 mg/dL    GFR Estimate 64 >60 mL/min/[1.73_m2]    GFR Estimate If Black 78 >60 mL/min/[1.73_m2]    Calcium 9.7 8.6 - 10.3 mg/dL    Bilirubin Total 0.8 0.3 - 1.0 mg/dL    Albumin 4.5 3.5 - 5.7 g/dL    Protein Total 7.6 6.4 - 8.9 g/dL    Alkaline Phosphatase 61 34 - 104 U/L    ALT 59 (H) 7 - 52 U/L    AST 36 13 - 39 U/L   Hemoglobin A1c   Result Value Ref Range    Hemoglobin A1C 6.0 4.0 - 6.0 %   PSA Screen GH   Result Value Ref Range    PSA Screen 1.160 <3.100 ng/mL   Lipid Profile   Result Value Ref Range    Cholesterol 140 <200 mg/dL    Triglycerides 66 <150 mg/dL    HDL Cholesterol 42 23 - 92 mg/dL    LDL Cholesterol Calculated 85 <100 mg/dL    Non HDL Cholesterol 98 <130 mg/dL       No images are attached to the encounter or orders placed in the encounter.      1. Prediabetes  Patient's A1c is stable.  At this time I do not think he needs to add medications to his regimen.  Discussed importance of maintaining a low carbohydrate diet and gradual weight loss and improving glycemic control.  He has already been started this and plans on continuing.  Patient does not smoke.  Blood pressure is stable and fairly good.  He is taking a daily aspirin and is on statin.  - blood glucose (ONETOUCH ULTRA) test strip; TEST 1 TIME PER DAY  Dispense: 100 strip; Refill: 3  - blood glucose monitoring (ONE TOUCH DELICA) lancets; Use to test blood sugar 1 time daily or as directed.  Dispense: 100 each; Refill: 3  - Hemoglobin A1c; Future  - Hemoglobin A1c    2. Essential hypertension  Blood pressure is relatively normal.  Has been good in the past.  Continue current regimen.  BMP normal.  - metoprolol succinate ER (TOPROL-XL) 25 MG 24 hr tablet; Take 1 tablet  (25 mg) by mouth daily  Dispense: 90 tablet; Refill: 3  - lisinopril-hydrochlorothiazide (PRINZIDE/ZESTORETIC) 20-25 MG tablet; Take 1 tablet by mouth daily  Dispense: 90 tablet; Refill: 3  - Comprehensive metabolic panel; Future  - Comprehensive metabolic panel    3.  Osteoarthritis  Discussed potential etiologies of joint pain.  Given distribution and description this is most likely osteoarthritis.  Discussed icing and monitoring.  If worsening could consider NSAIDs but do need to be careful for potential gastric or renal complications.  Patient voiced good understanding    4.  Bunion deformity  Discussed benefit of orthotics.  He would like to hold off on treatment at this time as he reports symptoms are mild    5. Hyperlipidemia LDL goal <100  LDL excellent.  Continue current dose of Lipitor  - atorvastatin (LIPITOR) 80 MG tablet; Take 1 tablet (80 mg) by mouth daily  Dispense: 90 tablet; Refill: 3  - Lipid Profile; Future  - Lipid Profile    6. Screening for prostate cancer  Patient is asymptomatic.  PSA reassuring.  - PSA Screen GH; Future  - PSA Screen GH    7. Need for prophylactic vaccination and inoculation against influenza  Suggest flu shot.  - HC FLU VAC PRESRV FREE QUAD SPLIT VIR 3+YRS IM  - Vaccine Administration, Initial [[id number]]    8.  Physical exam  Overall patient appears healthy.  He is up-to-date on cancer screening.  Your vascular risk factors are reasonably well controlled.  As noted above suggest further dietary changes and weight reduction to optimize glycemic control and blood pressure.    Discussed with patient principals of healthy eating.  Suggest focus on vegetables and lean meats.  Discussed goal weight.  Stressed importance of daily cardiovascular exercise and good sleep hygiene.      There are no Patient Instructions on file for this visit.    Rakesh Escalante    This document was created using computer generated templates and voiceactivated software.

## 2019-03-08 DIAGNOSIS — R73.03 PREDIABETES: Primary | ICD-10-CM

## 2019-03-08 RX ORDER — BLOOD-GLUCOSE METER
EACH MISCELLANEOUS
Qty: 1 KIT | Refills: 0 | Status: SHIPPED | OUTPATIENT
Start: 2019-03-08

## 2019-03-08 RX ORDER — BLOOD-GLUCOSE CONTROL, NORMAL
EACH MISCELLANEOUS
Qty: 1 BOTTLE | Refills: 11 | Status: CANCELLED | OUTPATIENT
Start: 2019-03-08

## 2019-03-08 NOTE — TELEPHONE ENCOUNTER
Writer notes that Dr. Escalante just filled lancets and test strips for patient as noted below on 3/6/19:    Outpatient Medication Detail      Disp Refills Start End JAI   blood glucose (ONETOUCH ULTRA) test strip 100 strip 3 3/6/2019  No   Sig: TEST 1 TIME PER DAY   Sent to pharmacy as: blood glucose (ONETOUCH ULTRA) test strip   Class: E-Prescribe   Order: [id number]   E-Prescribing Status: Receipt confirmed by pharmacy (3/6/2019  9:29 AM CST)   Printout Tracking     External Result Report   Medication Administration Instructions     TEST 1 TIME PER DAY   Pharmacy     [facility] [address] [place] [address]     Outpatient Medication Detail      Disp Refills Start End JAI   blood glucose monitoring (ONE TOUCH DELICA) lancets 100 each 3 3/6/2019  --   Sig: Use to test blood sugar 1 time daily or as directed.   Sent to pharmacy as: blood glucose monitoring (ONE TOUCH DELICA) lancets   Class: E-Prescribe   Order: [id number]   E-Prescribing Status: Receipt confirmed by pharmacy (3/6/2019  9:29 AM CST)   Printout Tracking     External Result Report   Medication Administration Instructions     Use to test blood sugar 1 time daily or as directed.   Pharmacy     [facility] [address] [place] [address]     Writer notes documentation as per JOVON Hurley. Writer will resend Rxs as requested for a 6 month supply at this time.    Prescription refilled per RN Medication Refill Policy..................Matt Pearson 3/8/2019 1:49 PM

## 2020-02-14 DIAGNOSIS — E78.5 HYPERLIPIDEMIA LDL GOAL <100: ICD-10-CM

## 2020-02-14 DIAGNOSIS — I10 ESSENTIAL HYPERTENSION: ICD-10-CM

## 2020-02-14 RX ORDER — ATORVASTATIN CALCIUM 80 MG/1
TABLET, FILM COATED ORAL
Qty: 90 TABLET | Refills: 0 | Status: SHIPPED | OUTPATIENT
Start: 2020-02-14 | End: 2020-05-14

## 2020-02-14 RX ORDER — LISINOPRIL AND HYDROCHLOROTHIAZIDE 20; 25 MG/1; MG/1
TABLET ORAL
Qty: 90 TABLET | Refills: 0 | Status: SHIPPED | OUTPATIENT
Start: 2020-02-14 | End: 2020-05-14

## 2020-02-14 RX ORDER — METOPROLOL SUCCINATE 25 MG/1
TABLET, EXTENDED RELEASE ORAL
Qty: 90 TABLET | Refills: 0 | Status: SHIPPED | OUTPATIENT
Start: 2020-02-14 | End: 2020-05-14

## 2020-02-14 NOTE — TELEPHONE ENCOUNTER
Routing refill request to provider for review/approval because:  Patient noted to have Dr. Escalante as PCP but now Dr. Escalante no longer practicing in clinic.    Patient is now noted to have Dr. Rubin as PCP will route to Dr. Rubin for review and consideration.    Lisa Damon RN on 2/14/2020 at 8:52 AM

## 2020-05-12 DIAGNOSIS — I10 ESSENTIAL HYPERTENSION: Primary | ICD-10-CM

## 2020-05-12 DIAGNOSIS — R73.03 PREDIABETES: ICD-10-CM

## 2020-05-12 DIAGNOSIS — E78.5 HYPERLIPIDEMIA LDL GOAL <100: ICD-10-CM

## 2020-05-14 RX ORDER — ATORVASTATIN CALCIUM 80 MG/1
TABLET, FILM COATED ORAL
Qty: 90 TABLET | Refills: 0 | Status: SHIPPED | OUTPATIENT
Start: 2020-05-14 | End: 2020-08-07

## 2020-05-14 RX ORDER — LISINOPRIL AND HYDROCHLOROTHIAZIDE 20; 25 MG/1; MG/1
TABLET ORAL
Qty: 90 TABLET | Refills: 0 | Status: SHIPPED | OUTPATIENT
Start: 2020-05-14 | End: 2020-08-07

## 2020-05-14 RX ORDER — METOPROLOL SUCCINATE 25 MG/1
TABLET, EXTENDED RELEASE ORAL
Qty: 90 TABLET | Refills: 0 | Status: SHIPPED | OUTPATIENT
Start: 2020-05-14 | End: 2020-08-07

## 2020-05-14 NOTE — TELEPHONE ENCOUNTER
[id number] in [facility] [place] sent Rx request for the following:      ATORVASTATIN 80 MG TABLET     Sig: TAKE 1 TABLET BY MOUTH EVERY DAY    Last Prescription Date:   2/14/20  Last Fill Qty/Refills:         90, R-0    Statins Protocol Gkeucg9705/14/2020 08:44 AM  LDL on file in past 12 months   Recent (12 mo) or future (30 days) visit within the authorizing provider's specialty     LISINOPRIL-HCTZ 20-25 MG TAB    Sig: TAKE 1 TABLET BY MOUTH EVERY DAY    Last Prescription Date:   2/14/20  Last Fill Qty/Refills:         90, R-0    Diuretics (Including Combos) Protocol Rxxhga3405/14/2020 08:44 AM  Blood pressure under 140/90 in past 12 months   Recent (12 mo) or future (30 days) visit within the authorizing provider's specialty   Normal serum creatinine on file in past 12 months   Normal serum potassium on file in past 12 months   Normal serum sodium on file in past 12 months   ACE Inhibitors (Including Combos) Protocol Failed  Blood pressure under 140/90 in past 12 months   Recent (12 mo) or future (30 days) visit within the authorizing provider's specialty   Normal serum creatinine on file in past 12 months   Normal serum potassium on file in past 12 months     METOPROLOL SUCC ER 25 MG TAB    Sig: TAKE 1 TABLET BY MOUTH EVERY DAY    Last Prescription Date:   2/14/20  Last Fill Qty/Refills:         90, R-0    Beta-Blockers Protocol Bnhtvu9405/14/2020 08:44 AM  Blood pressure under 140/90 in past 12 months   Recent (12 mo) or future (30 days) visit within the authorizing provider's specialty     Last Office Visit:              3/6/19 (Dr. Escalante, previous PCP)  Future Office visit:           None    Please see [facility] Medical Advice encounter, dated 3/23/20. Pt plans to scheduled appointment closer to July. Attempted reaching Pt to see if he has enough medication to get by until Dr. Rubin returns on Monday 5/18. Left message on machine to call back. Fanta Hilton RN .............. 5/14/2020  9:13 AM

## 2020-08-07 DIAGNOSIS — E78.5 HYPERLIPIDEMIA LDL GOAL <100: ICD-10-CM

## 2020-08-07 DIAGNOSIS — I10 ESSENTIAL HYPERTENSION: ICD-10-CM

## 2020-08-07 NOTE — TELEPHONE ENCOUNTER
Requests   Disp  Refills  Start  End  JAI    metoprolol succinate ER (TOPROL-XL) 25 MG 24 hr tablet  90 tablet  0  5/14/2020   No    Sig: TAKE 1 TABLET BY MOUTH EVERY DAY    Sent to pharmacy as: Metoprolol Succinate ER 25 MG Oral Tablet Extended Release 24 Hour (TOPROL-XL)        Beta-Blockers Protocol Odfhtw7908/07/2020 10:17 AM   Blood pressure under 140/90 in past 12 months Protocol Details    Recent (12 mo) or future (30 days) visit within the authorizing provider's specialty        -------------------------------   Disp  Refills  Start  End  JAI    lisinopril-hydrochlorothiazide (ZESTORETIC) 20-25 MG tablet  90 tablet  0  5/14/2020   No    Sig: TAKE 1 TABLET BY MOUTH EVERY DAY        Diuretics (Including Combos) Protocol Erztkh6508/07/2020 10:17 AM   Blood pressure under 140/90 in past 12 months Protocol Details    Recent (12 mo) or future (30 days) visit within the authorizing provider's specialty     Normal serum creatinine on file in past 12 months     Normal serum potassium on file in past 12 months     Normal serum sodium on file in past 12 months      ----------------------------------     Disp  Refills  Start  End  JAI    atorvastatin (LIPITOR) 80 MG tablet  90 tablet  0  5/14/2020   No    Sig: TAKE 1 TABLET BY MOUTH EVERY DAY        Statins Protocol Yvjgms0508/07/2020 03:34 PM   LDL on file in past 12 months Protocol Details    Recent (12 mo) or future (30 days) visit within the authorizing provider's specialty      LOV 3/6/19, needs appt, letter sent  Unable to complete prescription refill per RN Medication Refill Policy  Nakia Garay, LUKE .............. 8/7/2020  3:36 PM

## 2020-08-07 NOTE — LETTER
August 7, 2020      Kirk John  [address] [facility] [address]        Dear Kirk,     This letter is to remind you that you are due for your annual exam with a provider Your last comprehensive visit was more than 12 months ago.    A LIMITED refill request was sent to a provider for consideration. Additional refills require you to complete this appointment.    Please call the clinic at [phone] to schedule your appointment.    If you should require additional refills before your scheduled appointment, please contact your pharmacy and we will refill your medication until the date of your appointment.    If you are no longer seeing No Ref-Primary, Physician for primary care, please call to let us know. Doing so will remove you from our call/contact list.      Thank you for choosing [facility] and [facility] for your health care needs.    Sincerely,    Refill RN  [facility]

## 2020-08-10 RX ORDER — LISINOPRIL AND HYDROCHLOROTHIAZIDE 20; 25 MG/1; MG/1
1 TABLET ORAL DAILY
Qty: 30 TABLET | Refills: 0 | Status: SHIPPED | OUTPATIENT
Start: 2020-08-10 | End: 2020-09-02

## 2020-08-10 RX ORDER — ATORVASTATIN CALCIUM 80 MG/1
80 TABLET, FILM COATED ORAL DAILY
Qty: 30 TABLET | Refills: 0 | Status: SHIPPED | OUTPATIENT
Start: 2020-08-10 | End: 2020-09-02

## 2020-08-10 RX ORDER — METOPROLOL SUCCINATE 25 MG/1
25 TABLET, EXTENDED RELEASE ORAL DAILY
Qty: 30 TABLET | Refills: 0 | Status: SHIPPED | OUTPATIENT
Start: 2020-08-10 | End: 2020-09-02

## 2020-08-10 NOTE — TELEPHONE ENCOUNTER
Patient was notified of refills and he was transferred to scheduling to make an appointment with Dr. Rubin.    Suyapa Morales LPN on 8/10/2020 at 12:52 PM

## 2020-08-10 NOTE — TELEPHONE ENCOUNTER
1 moth sent in.  Needs physical and establish care.  Was previously scheduled with Dr. Rubin but cancelled due to covid

## 2020-09-02 ENCOUNTER — OFFICE VISIT (OUTPATIENT)
Dept: FAMILY MEDICINE | Facility: OTHER | Age: 65
End: 2020-09-02
Attending: FAMILY MEDICINE
Payer: MEDICARE

## 2020-09-02 VITALS
TEMPERATURE: 97.2 F | HEART RATE: 66 BPM | RESPIRATION RATE: 16 BRPM | BODY MASS INDEX: 35.17 KG/M2 | DIASTOLIC BLOOD PRESSURE: 92 MMHG | OXYGEN SATURATION: 98 % | HEIGHT: 71 IN | WEIGHT: 251.2 LBS | SYSTOLIC BLOOD PRESSURE: 152 MMHG

## 2020-09-02 DIAGNOSIS — E78.5 HYPERLIPIDEMIA LDL GOAL <100: ICD-10-CM

## 2020-09-02 DIAGNOSIS — Z23 NEED FOR VACCINATION FOR PNEUMOCOCCUS: ICD-10-CM

## 2020-09-02 DIAGNOSIS — I10 ESSENTIAL HYPERTENSION: ICD-10-CM

## 2020-09-02 DIAGNOSIS — I25.10 ATHEROSCLEROSIS OF CORONARY ARTERY, ANGINA PRESENCE UNSPECIFIED, UNSPECIFIED VESSEL OR LESION TYPE, UNSPECIFIED WHETHER NATIVE OR TRANSPLANTED HEART: ICD-10-CM

## 2020-09-02 DIAGNOSIS — Z13.6 SCREENING FOR AAA (ABDOMINAL AORTIC ANEURYSM): ICD-10-CM

## 2020-09-02 DIAGNOSIS — Z11.59 NEED FOR HEPATITIS C SCREENING TEST: ICD-10-CM

## 2020-09-02 DIAGNOSIS — R73.03 PREDIABETES: ICD-10-CM

## 2020-09-02 DIAGNOSIS — Z00.00 ENCOUNTER FOR MEDICARE ANNUAL WELLNESS EXAM: Primary | ICD-10-CM

## 2020-09-02 DIAGNOSIS — Z87.891 HISTORY OF SMOKING: ICD-10-CM

## 2020-09-02 LAB
ALBUMIN SERPL-MCNC: 4.7 G/DL (ref 3.5–5.7)
ALP SERPL-CCNC: 57 U/L (ref 34–104)
ALT SERPL W P-5'-P-CCNC: 60 U/L (ref 7–52)
ANION GAP SERPL CALCULATED.3IONS-SCNC: 6 MMOL/L (ref 3–14)
AST SERPL W P-5'-P-CCNC: 40 U/L (ref 13–39)
BILIRUB SERPL-MCNC: 1 MG/DL (ref 0.3–1)
BUN SERPL-MCNC: 19 MG/DL (ref 7–25)
CALCIUM SERPL-MCNC: 9.9 MG/DL (ref 8.6–10.3)
CHLORIDE SERPL-SCNC: 101 MMOL/L (ref 98–107)
CHOLEST SERPL-MCNC: 147 MG/DL
CO2 SERPL-SCNC: 32 MMOL/L (ref 21–31)
CREAT SERPL-MCNC: 1.06 MG/DL (ref 0.7–1.3)
CREAT UR-MCNC: 122 MG/DL
ERYTHROCYTE [DISTWIDTH] IN BLOOD BY AUTOMATED COUNT: 12.5 % (ref 10–15)
GFR SERPL CREATININE-BSD FRML MDRD: 70 ML/MIN/{1.73_M2}
GLUCOSE SERPL-MCNC: 112 MG/DL (ref 70–105)
HBA1C MFR BLD: 6 % (ref 4–6)
HCT VFR BLD AUTO: 52.5 % (ref 40–53)
HDLC SERPL-MCNC: 50 MG/DL (ref 23–92)
HGB BLD-MCNC: 17.3 G/DL (ref 13.3–17.7)
LDLC SERPL CALC-MCNC: 79 MG/DL
MCH RBC QN AUTO: 31.6 PG (ref 26.5–33)
MCHC RBC AUTO-ENTMCNC: 33 G/DL (ref 31.5–36.5)
MCV RBC AUTO: 96 FL (ref 78–100)
MICROALBUMIN UR-MCNC: 357 MG/L
MICROALBUMIN/CREAT UR: 292.62 MG/G CR (ref 0–17)
NONHDLC SERPL-MCNC: 97 MG/DL
PLATELET # BLD AUTO: 170 10E9/L (ref 150–450)
POTASSIUM SERPL-SCNC: 4.3 MMOL/L (ref 3.5–5.1)
PROT SERPL-MCNC: 7.5 G/DL (ref 6.4–8.9)
RBC # BLD AUTO: 5.47 10E12/L (ref 4.4–5.9)
SODIUM SERPL-SCNC: 139 MMOL/L (ref 134–144)
TRIGL SERPL-MCNC: 88 MG/DL
WBC # BLD AUTO: 8 10E9/L (ref 4–11)

## 2020-09-02 PROCEDURE — 80053 COMPREHEN METABOLIC PANEL: CPT | Mod: ZL | Performed by: FAMILY MEDICINE

## 2020-09-02 PROCEDURE — G0009 ADMIN PNEUMOCOCCAL VACCINE: HCPCS

## 2020-09-02 PROCEDURE — G0402 INITIAL PREVENTIVE EXAM: HCPCS | Performed by: FAMILY MEDICINE

## 2020-09-02 PROCEDURE — G0472 HEP C SCREEN HIGH RISK/OTHER: HCPCS | Mod: ZL | Performed by: FAMILY MEDICINE

## 2020-09-02 PROCEDURE — 99212 OFFICE O/P EST SF 10 MIN: CPT | Mod: 25 | Performed by: FAMILY MEDICINE

## 2020-09-02 PROCEDURE — 90670 PCV13 VACCINE IM: CPT

## 2020-09-02 PROCEDURE — G0463 HOSPITAL OUTPT CLINIC VISIT: HCPCS

## 2020-09-02 PROCEDURE — 82043 UR ALBUMIN QUANTITATIVE: CPT | Mod: ZL | Performed by: FAMILY MEDICINE

## 2020-09-02 PROCEDURE — 83036 HEMOGLOBIN GLYCOSYLATED A1C: CPT | Mod: ZL | Performed by: FAMILY MEDICINE

## 2020-09-02 PROCEDURE — 85027 COMPLETE CBC AUTOMATED: CPT | Mod: ZL | Performed by: FAMILY MEDICINE

## 2020-09-02 PROCEDURE — G0463 HOSPITAL OUTPT CLINIC VISIT: HCPCS | Mod: 25

## 2020-09-02 PROCEDURE — 36415 COLL VENOUS BLD VENIPUNCTURE: CPT | Mod: ZL | Performed by: FAMILY MEDICINE

## 2020-09-02 PROCEDURE — 90471 IMMUNIZATION ADMIN: CPT

## 2020-09-02 PROCEDURE — 80061 LIPID PANEL: CPT | Mod: ZL | Performed by: FAMILY MEDICINE

## 2020-09-02 RX ORDER — ATORVASTATIN CALCIUM 80 MG/1
80 TABLET, FILM COATED ORAL DAILY
Qty: 90 TABLET | Refills: 3 | Status: SHIPPED | OUTPATIENT
Start: 2020-09-02 | End: 2021-08-19

## 2020-09-02 RX ORDER — LISINOPRIL AND HYDROCHLOROTHIAZIDE 20; 25 MG/1; MG/1
1 TABLET ORAL DAILY
Qty: 90 TABLET | Refills: 3 | Status: SHIPPED | OUTPATIENT
Start: 2020-09-02 | End: 2021-08-19

## 2020-09-02 RX ORDER — NITROGLYCERIN 0.4 MG/1
TABLET SUBLINGUAL
Qty: 25 TABLET | Refills: 0 | Status: SHIPPED | OUTPATIENT
Start: 2020-09-02 | End: 2022-12-14

## 2020-09-02 RX ORDER — METOPROLOL SUCCINATE 25 MG/1
25 TABLET, EXTENDED RELEASE ORAL DAILY
Qty: 90 TABLET | Refills: 3 | Status: SHIPPED | OUTPATIENT
Start: 2020-09-02 | End: 2021-08-19

## 2020-09-02 ASSESSMENT — PAIN SCALES - GENERAL: PAINLEVEL: NO PAIN (0)

## 2020-09-02 ASSESSMENT — MIFFLIN-ST. JEOR: SCORE: 1938.63

## 2020-09-02 NOTE — PROGRESS NOTES
"SUBJECTIVE:   Kirk John is a 65 year old male who presents for Preventive Visit.    Are you in the first 12 months of your Medicare Part B coverage?  Yes    Physical Health:    In general, how would you rate your overall physical health? good    Outside of work, how many days during the week do you exercise? 2-3 days/week    Outside of work, approximately how many minutes a day do you exercise?15-30 minutes    If you drink alcohol do you typically have >3 drinks per day or >7 drinks per week? No    Do you usually eat at least 4 servings of fruit and vegetables a day, include whole grains & fiber and avoid regularly eating high fat or \"junk\" foods? NO    Do you have any problems taking medications regularly?  No    Do you have any side effects from medications? none    Needs assistance for the following daily activities: no assistance needed    Which of the following safety concerns are present in your home?  none identified     Hearing impairment: No    In the past 6 months, have you been bothered by leaking of urine? no    Mental Health:    In general, how would you rate your overall mental or emotional health? good  PHQ-2 Score: 1    Do you feel safe in your environment? Yes    Have you ever done Advance Care Planning? (For example, a Health Directive, POLST, or a discussion with a medical provider or your loved ones about your wishes): Yes, advance care planning is on file.    Additional concerns to address?  No    Fall risk:  Fallen 2 or more times in the past year?: No  Any fall with injury in the past year?: No    Cognitive Screenin) Repeat 3 items (Leader, Season, Table)    2) Clock draw: NORMAL  3) 3 item recall: Recalls 2 objects  Results: NORMAL clock, 1-2 items recalled: COGNITIVE IMPAIRMENT LESS LIKELY    Mini-CogTM Copyright S Haleigh. Licensed by the author for use in [facility]; reprinted with permission (jarvis@.[email or web address]). All rights reserved.      Do you have sleep apnea, excessive " snoring or daytime drowsiness?: snoring, no apnea      PROBLEMS TO ADD ON...  Diagnosed with prediabetes in 2018. Made diet changes and met with dietician. Improved blood sugars. Last year sugars were 112-120. Then slacked off on checking due to daughter passing away and COVID. Improved lately.   Recently have been , mostly about 100.    Blood pressure elevated. He has not tracked at home. Last year numbers were okay. Walks 3 miles without SOB or CP.      Reviewed and updated as needed this visit by clinical staff  Tobacco  Allergies  Meds  Med Hx  Surg Hx  Fam Hx  Soc Hx        Reviewed and updated as needed this visit by Provider  Tobacco  Allergies  Meds  Problems  Med Hx  Surg Hx  Fam Hx        Social History     Tobacco Use     Smoking status: Former Smoker     Last attempt to quit: 10/11/1984     Years since quittin.9     Smokeless tobacco: Never Used   Substance Use Topics     Alcohol use: Yes     Alcohol/week: 0.0 standard drinks     Comment: Alcoholic Drinks/day: 1 day                           Current providers sharing in care for this patient include:   Patient Care Team:  No Ref-Primary, Physician as PCP - General  Rakesh Escalante MD as Assigned PCP    The following health maintenance items are reviewed in Epic and correct as of today:  Health Maintenance   Topic Date Due     MICROALBUMIN  1955     DIABETIC FOOT EXAM  1955     HEPATITIS C SCREENING  1955     EYE EXAM  1955     HIV SCREENING  1970     HEPATITIS B IMMUNIZATION (1 of 3 - Risk 3-dose series) 1974     ZOSTER IMMUNIZATION (2 of 3) 2018     A1C  2019     PHQ-2  2020     BMP  2020     LIPID  2020     FALL RISK ASSESSMENT  2020     AORTIC ANEURYSM SCREENING (SYSTEM ASSIGNED)  2020     INFLUENZA VACCINE (1) 2020     MEDICARE ANNUAL WELLNESS VISIT  2020     PNEUMOCOCCAL IMMUNIZATION 65+ LOW/MEDIUM RISK (1 of 2 - PCV13) 2020      ADVANCE CARE PLANNING  06/15/2023     DTAP/TDAP/TD IMMUNIZATION (3 - Td) 2023     COLORECTAL CANCER SCREENING  2026     IPV IMMUNIZATION  Aged Out     MENINGITIS IMMUNIZATION  Aged Out     Patient Active Problem List   Diagnosis     Coronary atherosclerosis     Degeneration of intervertebral disc     Diverticulosis of sigmoid colon     Psychosexual dysfunction with inhibited sexual excitement     Health care maintenance     Hip pain     Hypercholesterolemia     Tick bite     Prediabetes     Advance Care Planning     Past Surgical History:   Procedure Laterality Date     ARTHROPLASTY HIP      2007,Left total hip with Dr Graham Escalante     COLONOSCOPY      6/3/05,Colonoscopy, normal.  F/u in 10 years     COLONOSCOPY  2016    Normal exam,  F/U      CV CARDIAC CATHERIZATION  2012    proximal circumflex, [facility]     RELEASE CARPAL TUNNEL      ,Right carpal tunnel sugery     TONSILLECTOMY, ADENOIDECTOMY, COMBINED      T&A as a child       Social History     Tobacco Use     Smoking status: Former Smoker     Last attempt to quit: 10/11/1984     Years since quittin.9     Smokeless tobacco: Never Used   Substance Use Topics     Alcohol use: Yes     Alcohol/week: 0.0 standard drinks     Comment: Alcoholic Drinks/day: 1 day     Family History   Problem Relation Age of Onset     Cancer Father         Cancer,Lung     Peripheral Vascular Disease Father      Heart Disease Mother 70        Heart Disease,CAD/MI     Pacemaker Sister      Heart Disease Maternal Grandfather 60        Heart Disease,CAD     Heart Disease Maternal Uncle 60        Heart Disease,CAD     No Known Problems Sister      Diabetes No family hx of      Prostate Cancer No family hx of      Colon Cancer No family hx of          Current Outpatient Medications   Medication Sig Dispense Refill     aspirin EC 81 MG EC tablet Take 81 mg by mouth daily with food       atorvastatin (LIPITOR) 80 MG tablet Take 1 tablet (80 mg) by mouth  "daily 90 tablet 3     blood glucose (NO BRAND SPECIFIED) test strip Use to test blood sugar 1 times daily. Dx: R73.03 100 strip 1     blood glucose monitoring (ACCU-CHEK ENEDINA PLUS) meter device kit Use to test blood sugar 1 times daily. Dx: R73.03 1 kit 0     blood glucose monitoring (ACCU-CHEK MULTICLIX) lancets Use to test blood sugar 1 times daily. Dx: R73.03 102 each 1     Blood Glucose Monitoring Suppl (FIFTY50 GLUCOSE METER 2.0) w/Device KIT Dispense meter, test strips, lancets covered by pt ins. E11.9 NIDDM type II - Test 1 time/day       lisinopril-hydrochlorothiazide (ZESTORETIC) 20-25 MG tablet Take 1 tablet by mouth daily 90 tablet 3     metoprolol succinate ER (TOPROL-XL) 25 MG 24 hr tablet Take 1 tablet (25 mg) by mouth daily 90 tablet 3     Multiple Vitamins-Minerals (MULTIVITAMIN & MINERAL PO) Take 1 tablet by mouth       nitroGLYcerin (NITROSTAT) 0.4 MG sublingual tablet PLACE 1 TABLET UNDER THE TONGUE EVERY 5 MINUTES IF NEEDED FOR CHEST PAIN. 25 tablet 0     Omega-3 Fatty Acids (FISH OIL PO) Take 2 capsules by mouth daily       No Known Allergies    ROS:  General: Denies general constitutional problems  Eyes: Denies problems  Ears/Nose/Throat: Denies problems  Cardiovascular: Denies problems  Respiratory: Denies problems  Gastrointestinal: Denies problems  Genitourinary: Denies problems  Musculoskeletal: Denies problems  Skin: Denies problems  Neurologic: Denies problems  Psychiatric: Denies problems      OBJECTIVE:   BP (!) 152/92   Pulse 66   Temp 97.2  F (36.2  C) (Temporal)   Resp 16   Ht 1.791 m (5' 10.5\")   Wt 113.9 kg (251 lb 3.2 oz)   SpO2 98%   BMI 35.53 kg/m   Estimated body mass index is 35.53 kg/m  as calculated from the following:    Height as of this encounter: 1.791 m (5' 10.5\").    Weight as of this encounter: 113.9 kg (251 lb 3.2 oz).  EXAM:   General Appearance: Pleasant, alert, appropriate appearance for age. No acute distress  Eye Exam:  Normal external eye, conjunctiva, " lids, cornea. DARIUS.  Ear Exam: Normal TM's bilaterally. Normal auditory canals and external ears.  OroPharynx Exam:  Dental hygiene adequate. Normal buccal mucosa. Normal pharynx.  Neck Exam:  Supple, no masses or nodes. No carotid bruits.  Thyroid Exam: No nodules or enlargement.  Chest/Respiratory Exam: Normal chest wall and respirations. Clear to auscultation.  Cardiovascular Exam: Regular rate and rhythm. S1, S2, no murmur, click, gallop, or rubs.  Gastrointestinal Exam: Soft, non-tender, no masses or organomegaly.  Musculoskeletal Exam: Back is straight and non-tender, full ROM of upper and lower extremities.  Foot Exam: Left and right foot: good pedal pulses.  Skin: no rash or abnormalities  Neurologic Exam: Nonfocal, symmetric DTRs, normal gross motor, tone coordination and no tremor.  Psychiatric Exam: Alert and oriented - appropriate affect.      Results for orders placed or performed in visit on 09/02/20   Hepatitis C Screen Reflex to HCV RNA Quant and Genotype     Status: None   Result Value Ref Range    Hepatitis C Antibody Nonreactive NR^Nonreactive   Albumin Random Urine Quantitative with Creat Ratio     Status: Abnormal   Result Value Ref Range    Creatinine Urine 122 mg/dL    Albumin Urine mg/L 357 mg/L    Albumin Urine mg/g Cr 292.62 (H) 0 - 17 mg/g Cr   Hemoglobin A1c     Status: None   Result Value Ref Range    Hemoglobin A1C 6.0 4.0 - 6.0 %   CBC W PLT No Diff     Status: None   Result Value Ref Range    WBC 8.0 4.0 - 11.0 10e9/L    RBC Count 5.47 4.4 - 5.9 10e12/L    Hemoglobin 17.3 13.3 - 17.7 g/dL    Hematocrit 52.5 40.0 - 53.0 %    MCV 96 78 - 100 fl    MCH 31.6 26.5 - 33.0 pg    MCHC 33.0 31.5 - 36.5 g/dL    RDW 12.5 10.0 - 15.0 %    Platelet Count 170 150 - 450 10e9/L   Comprehensive Metabolic Panel     Status: Abnormal   Result Value Ref Range    Sodium 139 134 - 144 mmol/L    Potassium 4.3 3.5 - 5.1 mmol/L    Chloride 101 98 - 107 mmol/L    Carbon Dioxide 32 (H) 21 - 31 mmol/L    Anion  Gap 6 3 - 14 mmol/L    Glucose 112 (H) 70 - 105 mg/dL    Urea Nitrogen 19 7 - 25 mg/dL    Creatinine 1.06 0.70 - 1.30 mg/dL    GFR Estimate 70 >60 mL/min/[1.73_m2]    GFR Estimate If Black 85 >60 mL/min/[1.73_m2]    Calcium 9.9 8.6 - 10.3 mg/dL    Bilirubin Total 1.0 0.3 - 1.0 mg/dL    Albumin 4.7 3.5 - 5.7 g/dL    Protein Total 7.5 6.4 - 8.9 g/dL    Alkaline Phosphatase 57 34 - 104 U/L    ALT 60 (H) 7 - 52 U/L    AST 40 (H) 13 - 39 U/L   Lipid Panel     Status: None   Result Value Ref Range    Cholesterol 147 <200 mg/dL    Triglycerides 88 <150 mg/dL    HDL Cholesterol 50 23 - 92 mg/dL    LDL Cholesterol Calculated 79 <100 mg/dL    Non HDL Cholesterol 97 <130 mg/dL        ASSESSMENT / PLAN:       ICD-10-CM    1. Encounter for Medicare annual wellness exam  Z00.00    2. Hyperlipidemia LDL goal <100  E78.5 atorvastatin (LIPITOR) 80 MG tablet   3. Atherosclerosis of coronary artery, angina presence unspecified, unspecified vessel or lesion type, unspecified whether native or transplanted heart  I25.10 nitroGLYcerin (NITROSTAT) 0.4 MG sublingual tablet   4. Essential hypertension  I10 lisinopril-hydrochlorothiazide (ZESTORETIC) 20-25 MG tablet     metoprolol succinate ER (TOPROL-XL) 25 MG 24 hr tablet   5. Need for hepatitis C screening test  Z11.59 Hepatitis C Screen Reflex to HCV RNA Quant and Genotype   6. Screening for AAA (abdominal aortic aneurysm)  Z13.6 Abdominal Aortic Aneurysm Screening/Tracking     US Aorta Medicare AAA Screening   7. History of smoking  Z87.891 Abdominal Aortic Aneurysm Screening/Tracking     US Aorta Medicare AAA Screening   8. Prediabetes  R73.03 Albumin Random Urine Quantitative with Creat Ratio     blood glucose (NO BRAND SPECIFIED) test strip     blood glucose monitoring (ACCU-CHEK MULTICLIX) lancets   9. Need for vaccination for pneumococcus  Z23 GH IMM-  PNEUMOCOCCAL CONJ VACCINE 13 VALENT IM       Obtained labs to check on prediabetes, hypertension, CAD.  A1c stable. He will  "continue monitoring blood sugars periodically.    CAD stable, refilled medications    Blood pressure elevated. Discussed risks with elevated readings. No recent home readings. Elected to monitor at home, if elevated then needs treatment. Order microalbumin, plan was if elevated, then to up lisinopril dose. If blood pressure is over 130/80 at home, then add lisinopril to existing combo pill and follow up a few weeks later.    COUNSELING:  Reviewed preventive health counseling, as reflected in patient instructions       Consider AAA screening for ages 65-75 and smoking history - ordered 2020       Regular exercise       Healthy diet/nutrition       Vision screening       Hearing screening       Immunizations    Vaccinated for: Pneumococcal         Aspirin Prophylaxsis - yes with CAD       Hepatitis C screening - 2020       Colon cancer screening - due 2026       Prostate cancer screening - normal 2019, repeat 2-4 years    Estimated body mass index is 35.53 kg/m  as calculated from the following:    Height as of this encounter: 1.791 m (5' 10.5\").    Weight as of this encounter: 113.9 kg (251 lb 3.2 oz).    Weight management plan: Discussed healthy diet and exercise guidelines    He reports that he quit smoking about 35 years ago. He has never used smokeless tobacco.    Appropriate preventive services were discussed with this patient, including applicable screening as appropriate for cardiovascular disease, diabetes, osteopenia/osteoporosis, and glaucoma.  As appropriate for age/gender, discussed screening for colorectal cancer, prostate cancer, breast cancer, and cervical cancer. Checklist reviewing preventive services available has been given to the patient.    Reviewed patients plan of care and provided an AVS. The Basic Care Plan (routine screening as documented in Health Maintenance) for Kirk meets the Care Plan requirement. This Care Plan has been established and reviewed with the Patient.    Counseling " Resources:  ATP IV Guidelines  Pooled Cohorts Equation Calculator  Breast Cancer Risk Calculator  BRCA-Related Cancer Risk Assessment: FHS-7 Tool  FRAX Risk Assessment  ICSI Preventive Guidelines  Dietary Guidelines for Americans, 2010  USDA's MyPlate  ASA Prophylaxis  Lung CA Screening    Calin Rubin MD  [facility] AND [facility]

## 2020-09-02 NOTE — NURSING NOTE
Pt presents to clinic today for annual physical.      Medication Reconciliation: complete  Francine August LPN

## 2020-09-02 NOTE — PATIENT INSTRUCTIONS
Check blood pressure at home. Goal under 130/80, need more medication if over 140/90          Patient Education   Personalized Prevention Plan  You are due for the preventive services outlined below.  Your care team is available to assist you in scheduling these services.  If you have already completed any of these items, please share that information with your care team to update in your medical record.  Health Maintenance Due   Topic Date Due     Kidney Microalbumin Urine Test  1955     Diabetic Foot Exam  1955     Hepatitis C Screening  1955     Eye Exam  1955     HIV Screening  08/20/1970     Hepatitis B Vaccine (1 of 3 - Risk 3-dose series) 08/20/1974     Zoster (Shingles) Vaccine (2 of 3) 02/14/2018     A1C Lab  09/06/2019     PHQ-2  01/01/2020     Basic Metabolic Panel  03/06/2020     Cholesterol Lab  03/06/2020     FALL RISK ASSESSMENT  08/20/2020     AORTIC ANEURYSM SCREENING (SYSTEM ASSIGNED)  08/20/2020     Flu Vaccine (1) 09/01/2020     Annual Wellness Visit  08/20/2020     Pneumococcal Vaccine (1 of 2 - PCV13) 08/20/2020

## 2020-09-03 ENCOUNTER — MYC MEDICAL ADVICE (OUTPATIENT)
Dept: FAMILY MEDICINE | Facility: OTHER | Age: 65
End: 2020-09-03

## 2020-09-03 LAB — HCV AB SERPL QL IA: NONREACTIVE

## 2020-09-09 ENCOUNTER — HOSPITAL ENCOUNTER (OUTPATIENT)
Dept: ULTRASOUND IMAGING | Facility: OTHER | Age: 65
Discharge: HOME | End: 2020-09-09
Attending: FAMILY MEDICINE | Admitting: FAMILY MEDICINE
Payer: MEDICARE

## 2020-09-09 DIAGNOSIS — Z87.891 HISTORY OF SMOKING: ICD-10-CM

## 2020-09-09 DIAGNOSIS — Z13.6 SCREENING FOR AAA (ABDOMINAL AORTIC ANEURYSM): ICD-10-CM

## 2020-09-09 PROCEDURE — 76706 US ABDL AORTA SCREEN AAA: CPT

## 2020-10-12 ENCOUNTER — MYC MEDICAL ADVICE (OUTPATIENT)
Dept: FAMILY MEDICINE | Facility: OTHER | Age: 65
End: 2020-10-12

## 2020-10-12 DIAGNOSIS — I10 ESSENTIAL HYPERTENSION: Primary | ICD-10-CM

## 2020-10-12 RX ORDER — LISINOPRIL 10 MG/1
10 TABLET ORAL DAILY
Qty: 30 TABLET | Refills: 1 | Status: SHIPPED | OUTPATIENT
Start: 2020-10-12 | End: 2020-11-18

## 2020-10-29 ENCOUNTER — MYC MEDICAL ADVICE (OUTPATIENT)
Dept: FAMILY MEDICINE | Facility: OTHER | Age: 65
End: 2020-10-29

## 2020-11-01 ENCOUNTER — MYC REFILL (OUTPATIENT)
Dept: FAMILY MEDICINE | Facility: OTHER | Age: 65
End: 2020-11-01

## 2020-11-01 DIAGNOSIS — I10 ESSENTIAL HYPERTENSION: ICD-10-CM

## 2020-11-02 RX ORDER — LISINOPRIL 10 MG/1
10 TABLET ORAL DAILY
Qty: 30 TABLET | Refills: 1 | OUTPATIENT
Start: 2020-11-02

## 2020-11-02 NOTE — TELEPHONE ENCOUNTER
Patient sent MeetDoctor message, requesting refill of the following, to be sent to [facility] #[id number] in [facility] [place]:     Requested Prescriptions   Pending Prescriptions Disp Refills   lisinopril (ZESTRIL) 10 MG tablet 30 tablet 1    Sig: Take 1 tablet (10 mg) by mouth daily Along with combination lisinopril hydrochlorothiazide 20/25 for lisinopril 30 mg daily   Last Prescription Date:   10/12/20  Last Fill Qty/Refills:         30, R-1  Last Office Visit:              9/2/20  Future Office visit:             Next 5 appointments (look out 90 days)    Nov 18, 2020  9:00 AM  SHORT with Calin Rubin MD  [facility] ([facility]) [address]  [place] [address]  [phone]         ACE Inhibitors (Including Combos) Protocol Failed - 11/2/2020  2:36 PM       Failed - Blood pressure under 140/90 in past 12 months    BP Readings from Last 3 Encounters:   09/02/20 (!) 152/92   03/06/19 132/84   01/24/18 138/86        If taking as directed, Pt will be out of medication on 12/10. Pt has appointment 11/18. Refill request refused, with note to pharmacy. Unable to complete prescription refill per RN Medication Refill Policy. Fanta Hilton RN .............. 11/2/2020  2:40 PM

## 2020-11-04 DIAGNOSIS — I10 ESSENTIAL HYPERTENSION: ICD-10-CM

## 2020-11-04 RX ORDER — LISINOPRIL 10 MG/1
TABLET ORAL
Qty: 30 TABLET | Refills: 1 | OUTPATIENT
Start: 2020-11-04

## 2020-11-04 NOTE — TELEPHONE ENCOUNTER
Redundant refill request refused: Too soon:    lisinopril (ZESTRIL) 10 MG tablet 30 tablet 1 10/12/2020  --   Sig - Route: Take 1 tablet (10 mg) by mouth daily Along with combination lisinopril hydrochlorothiazide 20/25 for lisinopril 30 mg daily - Oral   Sent to pharmacy as: Lisinopril 10 MG Oral Tablet (ZESTRIL)   Class: E-Prescribe   Order: [id number]   E-Prescribing Status: Receipt confirmed by pharmacy (10/12/2020  9:47 AM CDT)     [facility] [id number] [facility] - [place], [place] - [address]     Unable to complete prescription refill per RN Medication Refill Policy. Fanta Hilton RN .............. 11/4/2020  3:07 PM

## 2020-11-18 ENCOUNTER — OFFICE VISIT (OUTPATIENT)
Dept: FAMILY MEDICINE | Facility: OTHER | Age: 65
End: 2020-11-18
Attending: FAMILY MEDICINE
Payer: MEDICARE

## 2020-11-18 VITALS
TEMPERATURE: 96.8 F | DIASTOLIC BLOOD PRESSURE: 78 MMHG | BODY MASS INDEX: 36.5 KG/M2 | RESPIRATION RATE: 16 BRPM | HEART RATE: 60 BPM | SYSTOLIC BLOOD PRESSURE: 130 MMHG | WEIGHT: 258 LBS

## 2020-11-18 DIAGNOSIS — I10 ESSENTIAL HYPERTENSION: Primary | ICD-10-CM

## 2020-11-18 DIAGNOSIS — R74.8 ELEVATED LIVER ENZYMES: ICD-10-CM

## 2020-11-18 LAB
ALBUMIN SERPL-MCNC: 4.2 G/DL (ref 3.5–5.7)
ALP SERPL-CCNC: 52 U/L (ref 34–104)
ALT SERPL W P-5'-P-CCNC: 62 U/L (ref 7–52)
ANION GAP SERPL CALCULATED.3IONS-SCNC: 4 MMOL/L (ref 3–14)
AST SERPL W P-5'-P-CCNC: 42 U/L (ref 13–39)
BILIRUB SERPL-MCNC: 0.7 MG/DL (ref 0.3–1)
BUN SERPL-MCNC: 21 MG/DL (ref 7–25)
CALCIUM SERPL-MCNC: 9.4 MG/DL (ref 8.6–10.3)
CHLORIDE SERPL-SCNC: 104 MMOL/L (ref 98–107)
CO2 SERPL-SCNC: 33 MMOL/L (ref 21–31)
CREAT SERPL-MCNC: 0.98 MG/DL (ref 0.7–1.3)
GFR SERPL CREATININE-BSD FRML MDRD: 77 ML/MIN/{1.73_M2}
GLUCOSE SERPL-MCNC: 127 MG/DL (ref 70–105)
POTASSIUM SERPL-SCNC: 4.7 MMOL/L (ref 3.5–5.1)
PROT SERPL-MCNC: 6.5 G/DL (ref 6.4–8.9)
SODIUM SERPL-SCNC: 141 MMOL/L (ref 134–144)

## 2020-11-18 PROCEDURE — 80053 COMPREHEN METABOLIC PANEL: CPT | Mod: ZL | Performed by: FAMILY MEDICINE

## 2020-11-18 PROCEDURE — 36415 COLL VENOUS BLD VENIPUNCTURE: CPT | Mod: ZL | Performed by: FAMILY MEDICINE

## 2020-11-18 PROCEDURE — G0463 HOSPITAL OUTPT CLINIC VISIT: HCPCS

## 2020-11-18 PROCEDURE — 99213 OFFICE O/P EST LOW 20 MIN: CPT | Performed by: FAMILY MEDICINE

## 2020-11-18 RX ORDER — LISINOPRIL 10 MG/1
10 TABLET ORAL DAILY
Qty: 90 TABLET | Refills: 3 | Status: SHIPPED | OUTPATIENT
Start: 2020-11-18 | End: 2021-11-17

## 2020-11-18 ASSESSMENT — PATIENT HEALTH QUESTIONNAIRE - PHQ9
SUM OF ALL RESPONSES TO PHQ QUESTIONS 1-9: 2
5. POOR APPETITE OR OVEREATING: NOT AT ALL

## 2020-11-18 ASSESSMENT — ANXIETY QUESTIONNAIRES
5. BEING SO RESTLESS THAT IT IS HARD TO SIT STILL: NOT AT ALL
1. FEELING NERVOUS, ANXIOUS, OR ON EDGE: NOT AT ALL
IF YOU CHECKED OFF ANY PROBLEMS ON THIS QUESTIONNAIRE, HOW DIFFICULT HAVE THESE PROBLEMS MADE IT FOR YOU TO DO YOUR WORK, TAKE CARE OF THINGS AT HOME, OR GET ALONG WITH OTHER PEOPLE: NOT DIFFICULT AT ALL
6. BECOMING EASILY ANNOYED OR IRRITABLE: NOT AT ALL
GAD7 TOTAL SCORE: 0
3. WORRYING TOO MUCH ABOUT DIFFERENT THINGS: NOT AT ALL
2. NOT BEING ABLE TO STOP OR CONTROL WORRYING: NOT AT ALL
7. FEELING AFRAID AS IF SOMETHING AWFUL MIGHT HAPPEN: NOT AT ALL

## 2020-11-18 ASSESSMENT — PAIN SCALES - GENERAL: PAINLEVEL: MILD PAIN (3)

## 2020-11-18 NOTE — NURSING NOTE
"Patient presents to the clinic for follow up with blood pressure readings.      Previous A1C is at goal of <8  Lab Results   Component Value Date    A1C 6.0 09/02/2020    A1C 6.0 03/06/2019    A1C 5.8 04/05/2018     Urine microalbumin:creatine: n.a  Foot exam unknown-declines today  Eye exam Summer 2020    Tobacco User no  Patient is on a daily aspirin  Patient is on a Statin.  Blood pressure today of:     BP Readings from Last 1 Encounters:   09/02/20 (!) 152/92      is not at the goal of <139/89 for diabetics.    Chief Complaint   Patient presents with     Clinic Care Coordination - Follow-up       Initial /78 (BP Location: Left arm, Patient Position: Sitting, Cuff Size: Adult Regular)   Pulse 60   Temp 96.8  F (36  C) (Tympanic)   Resp 16   Wt 117 kg (258 lb)   BMI 36.50 kg/m   Estimated body mass index is 36.5 kg/m  as calculated from the following:    Height as of 9/2/20: 1.791 m (5' 10.5\").    Weight as of this encounter: 117 kg (258 lb).  Medication Reconciliation: complete    Fariba Sloan LPN    " For diarrhea - try daily probiotic such as Floragen or other - review in couple of weeks.   If persistent - would do some stool testing  If unrevealing would consider GI evaluation for colonoscopy.

## 2020-11-18 NOTE — PROGRESS NOTES
"Nursing Notes:   Fariba Sloan LPN  11/18/2020  9:14 AM  Sign at exiting of workspace  Patient presents to the clinic for follow up with blood pressure readings.      Previous A1C is at goal of <8  Lab Results   Component Value Date    A1C 6.0 09/02/2020    A1C 6.0 03/06/2019    A1C 5.8 04/05/2018     Urine microalbumin:creatine: n.a  Foot exam unknown-declines today  Eye exam Summer 2020    Tobacco User no  Patient is on a daily aspirin  Patient is on a Statin.  Blood pressure today of:     BP Readings from Last 1 Encounters:   09/02/20 (!) 152/92      is not at the goal of <139/89 for diabetics.    Chief Complaint   Patient presents with     Clinic Care Coordination - Follow-up       Initial /78 (BP Location: Left arm, Patient Position: Sitting, Cuff Size: Adult Regular)   Pulse 60   Temp 96.8  F (36  C) (Tympanic)   Resp 16   Wt 117 kg (258 lb)   BMI 36.50 kg/m   Estimated body mass index is 36.5 kg/m  as calculated from the following:    Height as of 9/2/20: 1.791 m (5' 10.5\").    Weight as of this encounter: 117 kg (258 lb).  Medication Reconciliation: complete    Fariba Sloan LPN        SUBJECTIVE:  65 year old male presents to follow up on hypertension  Blood pressure readings at home were 134/84 on average  Urine microalbumin elevated, so increased lisinopril dose  Average was 126/81 a couple weeks ago  Now average is 126/79. Tolerating increase.    ALT and AST slightly elevated, likely fatty liver with prediabetes       REVIEW OF SYSTEMS:    Constitutional: negative  Respiratory: negative  Cardiovascular: negative    Current Outpatient Medications   Medication Sig Dispense Refill     aspirin EC 81 MG EC tablet Take 81 mg by mouth daily with food       atorvastatin (LIPITOR) 80 MG tablet Take 1 tablet (80 mg) by mouth daily 90 tablet 3     blood glucose (NO BRAND SPECIFIED) test strip Use to test blood sugar 1 times daily. Dx: R73.03 100 strip 1     blood glucose monitoring (ACCU-CHEK " ENEDINA PLUS) meter device kit Use to test blood sugar 1 times daily. Dx: R73.03 1 kit 0     blood glucose monitoring (ACCU-CHEK MULTICLIX) lancets Use to test blood sugar 1 times daily. Dx: R73.03 102 each 1     lisinopril (ZESTRIL) 10 MG tablet Take 1 tablet (10 mg) by mouth daily Along with combination lisinopril hydrochlorothiazide 20/25 for lisinopril 30 mg daily 30 tablet 1     lisinopril-hydrochlorothiazide (ZESTORETIC) 20-25 MG tablet Take 1 tablet by mouth daily 90 tablet 3     metoprolol succinate ER (TOPROL-XL) 25 MG 24 hr tablet Take 1 tablet (25 mg) by mouth daily 90 tablet 3     Multiple Vitamins-Minerals (MULTIVITAMIN & MINERAL PO) Take 1 tablet by mouth       nitroGLYcerin (NITROSTAT) 0.4 MG sublingual tablet PLACE 1 TABLET UNDER THE TONGUE EVERY 5 MINUTES IF NEEDED FOR CHEST PAIN. 25 tablet 0     Omega-3 Fatty Acids (FISH OIL PO) Take 2 capsules by mouth daily       No Known Allergies    OBJECTIVE:  /78 (BP Location: Left arm, Patient Position: Sitting, Cuff Size: Adult Regular)   Pulse 60   Temp 96.8  F (36  C) (Tympanic)   Resp 16   Wt 117 kg (258 lb)   BMI 36.50 kg/m      EXAM:  General Appearance: Alert. No acute distress  Psychiatric: Normal affect and mentation    Results for orders placed or performed in visit on 11/18/20   Comprehensive Metabolic Panel     Status: Abnormal   Result Value Ref Range    Sodium 141 134 - 144 mmol/L    Potassium 4.7 3.5 - 5.1 mmol/L    Chloride 104 98 - 107 mmol/L    Carbon Dioxide 33 (H) 21 - 31 mmol/L    Anion Gap 4 3 - 14 mmol/L    Glucose 127 (H) 70 - 105 mg/dL    Urea Nitrogen 21 7 - 25 mg/dL    Creatinine 0.98 0.70 - 1.30 mg/dL    GFR Estimate 77 >60 mL/min/[1.73_m2]    GFR Estimate If Black >90 >60 mL/min/[1.73_m2]    Calcium 9.4 8.6 - 10.3 mg/dL    Bilirubin Total 0.7 0.3 - 1.0 mg/dL    Albumin 4.2 3.5 - 5.7 g/dL    Protein Total 6.5 6.4 - 8.9 g/dL    Alkaline Phosphatase 52 34 - 104 U/L    ALT 62 (H) 7 - 52 U/L    AST 42 (H) 13 - 39 U/L         ASSESSMENT/PLAN:    ICD-10-CM    1. Essential hypertension  I10 lisinopril (ZESTRIL) 10 MG tablet     Comprehensive Metabolic Panel     Comprehensive Metabolic Panel   2. Elevated liver enzymes  R74.8 Comprehensive Metabolic Panel     Comprehensive Metabolic Panel       Blood pressure under good control with current lisinopril dose of 30 mg daily and hydrochlorothiazide 25 mg daily.  Labs stable.    Recheck on ALT and AST shows slight elevation, but not 3 times normal, so thorough work-up not currently indicated.  We discussed how fatty liver is the likely etiology with associated prediabetes.  Work on diet improvement and weight loss.      Follow-up 1 year, sooner if needed    Calin Rubin MD    This document was prepared using a combination of typing and voice generated software.  While every attempt was made for accuracy, spelling and grammatical errors may exist.

## 2020-11-19 ASSESSMENT — ANXIETY QUESTIONNAIRES: GAD7 TOTAL SCORE: 0

## 2021-02-15 ENCOUNTER — TRANSFERRED RECORDS (OUTPATIENT)
Dept: HEALTH INFORMATION MANAGEMENT | Facility: OTHER | Age: 66
End: 2021-02-15
Payer: COMMERCIAL

## 2021-03-06 ENCOUNTER — HEALTH MAINTENANCE LETTER (OUTPATIENT)
Age: 66
End: 2021-03-06

## 2021-04-05 ENCOUNTER — MYC MEDICAL ADVICE (OUTPATIENT)
Dept: FAMILY MEDICINE | Facility: OTHER | Age: 66
End: 2021-04-05

## 2021-04-05 DIAGNOSIS — R73.03 PREDIABETES: Primary | ICD-10-CM

## 2021-08-19 DIAGNOSIS — I10 ESSENTIAL HYPERTENSION: ICD-10-CM

## 2021-08-19 DIAGNOSIS — E78.5 HYPERLIPIDEMIA LDL GOAL <100: ICD-10-CM

## 2021-08-19 RX ORDER — METOPROLOL SUCCINATE 25 MG/1
TABLET, EXTENDED RELEASE ORAL
Qty: 90 TABLET | Refills: 0 | Status: SHIPPED | OUTPATIENT
Start: 2021-08-19 | End: 2021-11-10

## 2021-08-19 RX ORDER — LISINOPRIL AND HYDROCHLOROTHIAZIDE 20; 25 MG/1; MG/1
TABLET ORAL
Qty: 90 TABLET | Refills: 0 | Status: SHIPPED | OUTPATIENT
Start: 2021-08-19 | End: 2021-11-10

## 2021-08-19 RX ORDER — ATORVASTATIN CALCIUM 80 MG/1
TABLET, FILM COATED ORAL
Qty: 90 TABLET | Refills: 0 | Status: SHIPPED | OUTPATIENT
Start: 2021-08-19 | End: 2021-11-10

## 2021-08-19 NOTE — TELEPHONE ENCOUNTER
[facility] in #[id number] in [facility] [place] sent Rx request for the following:      Requested Prescriptions   Pending Prescriptions Disp Refills     metoprolol succinate ER (TOPROL-XL) 25 MG 24 hr tablet [Pharmacy Med Name: METOPROLOL SUCC ER 25 MG TAB] 90 tablet 3     Sig: TAKE 1 TABLET BY MOUTH EVERY DAY   Last Prescription Date:   9/2/20  Last Fill Qty/Refills:         90, R-3         atorvastatin (LIPITOR) 80 MG tablet [Pharmacy Med Name: ATORVASTATIN 80 MG TABLET] 90 tablet 3     Sig: TAKE 1 TABLET BY MOUTH EVERY DAY   Last Prescription Date:   9/2/20  Last Fill Qty/Refills:         90, R-3         lisinopril-hydrochlorothiazide (ZESTORETIC) 20-25 MG tablet [Pharmacy Med Name: LISINOPRIL-HCTZ 20-25 MG TAB] 90 tablet 3     Sig: TAKE 1 TABLET BY MOUTH EVERY DAY   Last Prescription Date:   9/2/20  Last Fill Qty/Refills:         90, R-3      Last Office Visit:              11/18/20  Future Office visit:           None    Patient should have enough medication to get through until provider returns 8/23/21.    Unable to complete prescription refill per RN Medication Refill Policy. Fanta Hilton RN .............. 8/19/2021  2:56 PM

## 2021-10-09 ENCOUNTER — HEALTH MAINTENANCE LETTER (OUTPATIENT)
Age: 66
End: 2021-10-09

## 2021-10-29 ENCOUNTER — MYC MEDICAL ADVICE (OUTPATIENT)
Dept: FAMILY MEDICINE | Facility: OTHER | Age: 66
End: 2021-10-29
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
## 2021-11-01 RX ORDER — DOXYCYCLINE HYCLATE 100 MG
200 TABLET ORAL ONCE
Qty: 2 TABLET | Refills: 0 | Status: SHIPPED | OUTPATIENT
Start: 2021-11-01 | End: 2021-11-01

## 2021-11-10 DIAGNOSIS — I10 ESSENTIAL HYPERTENSION: ICD-10-CM

## 2021-11-10 DIAGNOSIS — E78.5 HYPERLIPIDEMIA LDL GOAL <100: ICD-10-CM

## 2021-11-10 RX ORDER — METOPROLOL SUCCINATE 25 MG/1
25 TABLET, EXTENDED RELEASE ORAL DAILY
Qty: 90 TABLET | Refills: 0 | Status: SHIPPED | OUTPATIENT
Start: 2021-11-10 | End: 2021-12-06

## 2021-11-10 RX ORDER — ATORVASTATIN CALCIUM 80 MG/1
80 TABLET, FILM COATED ORAL DAILY
Qty: 90 TABLET | Refills: 0 | Status: SHIPPED | OUTPATIENT
Start: 2021-11-10 | End: 2021-12-06

## 2021-11-10 RX ORDER — LISINOPRIL AND HYDROCHLOROTHIAZIDE 20; 25 MG/1; MG/1
1 TABLET ORAL DAILY
Qty: 90 TABLET | Refills: 0 | Status: SHIPPED | OUTPATIENT
Start: 2021-11-10 | End: 2021-12-06

## 2021-11-10 NOTE — TELEPHONE ENCOUNTER
[facility] in #[id number] in [facility] [place] sent Rx request for the following:      Requested Prescriptions   Pending Prescriptions Disp Refills     atorvastatin (LIPITOR) 80 MG tablet 90 tablet 0     Sig: Take 1 tablet (80 mg) by mouth daily   Last Prescription Date:   8/19/21  Last Fill Qty/Refills:         90, R-0    Routing refill request to provider for review/approval because:    Statins Protocol Failed - 11/10/2021  4:18 PM        Failed - LDL on file in past 12 months        lisinopril-hydrochlorothiazide (ZESTORETIC) 20-25 MG tablet 90 tablet 0     Sig: Take 1 tablet by mouth daily   Last Prescription Date:   8/9/21  Last Fill Qty/Refills:         90, R-0         metoprolol succinate ER (TOPROL-XL) 25 MG 24 hr tablet 90 tablet 0     Sig: Take 1 tablet (25 mg) by mouth daily   Last Prescription Date:   8/19/21  Last Fill Qty/Refills:         90, R-0      Last Office Visit:              1118/20  Future Office visit:             Next 5 appointments (look out 90 days)    Dec 06, 2021  8:20 AM  PHYSICAL with Calin Rubin MD  [facility] [address]  [facility] [id number] [phone]        Unable to complete prescription refill per RN Medication Refill Policy. Fanta Hilton RN .............. 11/10/2021  4:20 PM

## 2021-11-15 DIAGNOSIS — I10 ESSENTIAL HYPERTENSION: ICD-10-CM

## 2021-11-17 RX ORDER — LISINOPRIL 10 MG/1
10 TABLET ORAL DAILY
Qty: 90 TABLET | Refills: 0 | Status: SHIPPED | OUTPATIENT
Start: 2021-11-17 | End: 2021-12-06

## 2021-11-17 NOTE — TELEPHONE ENCOUNTER
[id number] in [facility] [place] sent Rx request for the following:      Requested Prescriptions   Pending Prescriptions Disp Refills     lisinopril (ZESTRIL) 10 MG tablet 90 tablet 3     Sig: Take 1 tablet (10 mg) by mouth daily Along with combination lisinopril hydrochlorothiazide 20/25 for lisinopril 30 mg daily   Last Prescription Date:   11/18/20  Last Fill Qty/Refills:         90, R-3    Last Office Visit:              11/18/20   Future Office visit:             Next 5 appointments (look out 90 days)    Dec 06, 2021  8:20 AM  PHYSICAL with Calin Rubin MD  [facility] ([facility] ) [address]  [place] [phone]        Unable to complete prescription refill per RN Medication Refill Policy. Fanta Hilton RN .............. 11/17/2021  10:01 AM

## 2021-12-06 ENCOUNTER — OFFICE VISIT (OUTPATIENT)
Dept: FAMILY MEDICINE | Facility: OTHER | Age: 66
End: 2021-12-06
Attending: FAMILY MEDICINE
Payer: MEDICARE

## 2021-12-06 VITALS
WEIGHT: 258 LBS | SYSTOLIC BLOOD PRESSURE: 130 MMHG | OXYGEN SATURATION: 98 % | HEIGHT: 71 IN | BODY MASS INDEX: 36.12 KG/M2 | HEART RATE: 64 BPM | TEMPERATURE: 96 F | DIASTOLIC BLOOD PRESSURE: 75 MMHG | RESPIRATION RATE: 16 BRPM

## 2021-12-06 DIAGNOSIS — I10 ESSENTIAL HYPERTENSION: Primary | ICD-10-CM

## 2021-12-06 DIAGNOSIS — Z23 NEED FOR PROPHYLACTIC VACCINATION AND INOCULATION AGAINST INFLUENZA: ICD-10-CM

## 2021-12-06 DIAGNOSIS — Z23 NEED FOR VACCINATION AGAINST STREPTOCOCCUS PNEUMONIAE: ICD-10-CM

## 2021-12-06 DIAGNOSIS — R80.9 MICROALBUMINURIA: ICD-10-CM

## 2021-12-06 DIAGNOSIS — R73.03 PREDIABETES: ICD-10-CM

## 2021-12-06 DIAGNOSIS — R20.0 NUMBNESS OF FOOT: ICD-10-CM

## 2021-12-06 DIAGNOSIS — E78.5 HYPERLIPIDEMIA LDL GOAL <100: ICD-10-CM

## 2021-12-06 LAB
ALBUMIN SERPL-MCNC: 4.5 G/DL (ref 3.5–5.7)
ALP SERPL-CCNC: 54 U/L (ref 34–104)
ALT SERPL W P-5'-P-CCNC: 57 U/L (ref 7–52)
ANION GAP SERPL CALCULATED.3IONS-SCNC: 6 MMOL/L (ref 3–14)
AST SERPL W P-5'-P-CCNC: 35 U/L (ref 13–39)
BILIRUB SERPL-MCNC: 0.8 MG/DL (ref 0.3–1)
BUN SERPL-MCNC: 25 MG/DL (ref 7–25)
CALCIUM SERPL-MCNC: 9.5 MG/DL (ref 8.6–10.3)
CHLORIDE BLD-SCNC: 100 MMOL/L (ref 98–107)
CHOLEST SERPL-MCNC: 153 MG/DL
CO2 SERPL-SCNC: 31 MMOL/L (ref 21–31)
CREAT SERPL-MCNC: 1.19 MG/DL (ref 0.7–1.3)
CREAT UR-MCNC: 211 MG/DL
ERYTHROCYTE [DISTWIDTH] IN BLOOD BY AUTOMATED COUNT: 12.2 % (ref 10–15)
FASTING STATUS PATIENT QL REPORTED: YES
GFR SERPL CREATININE-BSD FRML MDRD: 63 ML/MIN/1.73M2
GLUCOSE BLD-MCNC: 121 MG/DL (ref 70–105)
HBA1C MFR BLD: 6.1 % (ref 4–6.2)
HCT VFR BLD AUTO: 50.1 % (ref 40–53)
HDLC SERPL-MCNC: 43 MG/DL (ref 23–92)
HGB BLD-MCNC: 17.4 G/DL (ref 13.3–17.7)
LDLC SERPL CALC-MCNC: 90 MG/DL
MCH RBC QN AUTO: 33.3 PG (ref 26.5–33)
MCHC RBC AUTO-ENTMCNC: 34.7 G/DL (ref 31.5–36.5)
MCV RBC AUTO: 96 FL (ref 78–100)
MICROALBUMIN UR-MCNC: 371 MG/L
MICROALBUMIN/CREAT UR: 175.83 MG/G CR (ref 0–17)
NONHDLC SERPL-MCNC: 110 MG/DL
PLATELET # BLD AUTO: 175 10E3/UL (ref 150–450)
POTASSIUM BLD-SCNC: 4.1 MMOL/L (ref 3.5–5.1)
PROT SERPL-MCNC: 7 G/DL (ref 6.4–8.9)
RBC # BLD AUTO: 5.23 10E6/UL (ref 4.4–5.9)
SODIUM SERPL-SCNC: 137 MMOL/L (ref 134–144)
TRIGL SERPL-MCNC: 100 MG/DL
TSH SERPL DL<=0.005 MIU/L-ACNC: 3.89 MU/L (ref 0.4–4)
VIT B12 SERPL-MCNC: 504 PG/ML (ref 180–914)
WBC # BLD AUTO: 7.1 10E3/UL (ref 4–11)

## 2021-12-06 PROCEDURE — 90662 IIV NO PRSV INCREASED AG IM: CPT

## 2021-12-06 PROCEDURE — 80053 COMPREHEN METABOLIC PANEL: CPT | Mod: ZL | Performed by: FAMILY MEDICINE

## 2021-12-06 PROCEDURE — G0463 HOSPITAL OUTPT CLINIC VISIT: HCPCS | Mod: 25

## 2021-12-06 PROCEDURE — 99214 OFFICE O/P EST MOD 30 MIN: CPT | Performed by: FAMILY MEDICINE

## 2021-12-06 PROCEDURE — 80061 LIPID PANEL: CPT | Mod: ZL | Performed by: FAMILY MEDICINE

## 2021-12-06 PROCEDURE — G0008 ADMIN INFLUENZA VIRUS VAC: HCPCS

## 2021-12-06 PROCEDURE — 85027 COMPLETE CBC AUTOMATED: CPT | Mod: ZL | Performed by: FAMILY MEDICINE

## 2021-12-06 PROCEDURE — 84443 ASSAY THYROID STIM HORMONE: CPT | Mod: ZL | Performed by: FAMILY MEDICINE

## 2021-12-06 PROCEDURE — G0009 ADMIN PNEUMOCOCCAL VACCINE: HCPCS

## 2021-12-06 PROCEDURE — 82043 UR ALBUMIN QUANTITATIVE: CPT | Mod: ZL | Performed by: FAMILY MEDICINE

## 2021-12-06 PROCEDURE — 36415 COLL VENOUS BLD VENIPUNCTURE: CPT | Mod: ZL | Performed by: FAMILY MEDICINE

## 2021-12-06 PROCEDURE — 82607 VITAMIN B-12: CPT | Mod: ZL | Performed by: FAMILY MEDICINE

## 2021-12-06 PROCEDURE — 83036 HEMOGLOBIN GLYCOSYLATED A1C: CPT | Mod: ZL | Performed by: FAMILY MEDICINE

## 2021-12-06 RX ORDER — ATORVASTATIN CALCIUM 80 MG/1
80 TABLET, FILM COATED ORAL DAILY
Qty: 90 TABLET | Refills: 4 | Status: SHIPPED | OUTPATIENT
Start: 2021-12-06 | End: 2022-12-14

## 2021-12-06 RX ORDER — LISINOPRIL AND HYDROCHLOROTHIAZIDE 20; 25 MG/1; MG/1
1 TABLET ORAL DAILY
Qty: 90 TABLET | Refills: 4 | Status: SHIPPED | OUTPATIENT
Start: 2021-12-06 | End: 2022-12-14

## 2021-12-06 RX ORDER — METOPROLOL SUCCINATE 25 MG/1
25 TABLET, EXTENDED RELEASE ORAL DAILY
Qty: 90 TABLET | Refills: 4 | Status: SHIPPED | OUTPATIENT
Start: 2021-12-06 | End: 2022-12-14

## 2021-12-06 RX ORDER — LISINOPRIL 10 MG/1
10 TABLET ORAL DAILY
Qty: 90 TABLET | Refills: 4 | Status: SHIPPED | OUTPATIENT
Start: 2021-12-06 | End: 2022-12-14

## 2021-12-06 ASSESSMENT — PATIENT HEALTH QUESTIONNAIRE - PHQ9
10. IF YOU CHECKED OFF ANY PROBLEMS, HOW DIFFICULT HAVE THESE PROBLEMS MADE IT FOR YOU TO DO YOUR WORK, TAKE CARE OF THINGS AT HOME, OR GET ALONG WITH OTHER PEOPLE: NOT DIFFICULT AT ALL
SUM OF ALL RESPONSES TO PHQ QUESTIONS 1-9: 2
SUM OF ALL RESPONSES TO PHQ QUESTIONS 1-9: 2

## 2021-12-06 ASSESSMENT — ANXIETY QUESTIONNAIRES
5. BEING SO RESTLESS THAT IT IS HARD TO SIT STILL: NOT AT ALL
7. FEELING AFRAID AS IF SOMETHING AWFUL MIGHT HAPPEN: NOT AT ALL
2. NOT BEING ABLE TO STOP OR CONTROL WORRYING: NOT AT ALL
3. WORRYING TOO MUCH ABOUT DIFFERENT THINGS: NOT AT ALL
GAD7 TOTAL SCORE: 0
GAD7 TOTAL SCORE: 0
4. TROUBLE RELAXING: NOT AT ALL
8. IF YOU CHECKED OFF ANY PROBLEMS, HOW DIFFICULT HAVE THESE MADE IT FOR YOU TO DO YOUR WORK, TAKE CARE OF THINGS AT HOME, OR GET ALONG WITH OTHER PEOPLE?: NOT DIFFICULT AT ALL
6. BECOMING EASILY ANNOYED OR IRRITABLE: NOT AT ALL
GAD7 TOTAL SCORE: 0
1. FEELING NERVOUS, ANXIOUS, OR ON EDGE: NOT AT ALL
7. FEELING AFRAID AS IF SOMETHING AWFUL MIGHT HAPPEN: NOT AT ALL

## 2021-12-06 ASSESSMENT — PAIN SCALES - GENERAL: PAINLEVEL: NO PAIN (0)

## 2021-12-06 ASSESSMENT — MIFFLIN-ST. JEOR: SCORE: 1972.41

## 2021-12-06 NOTE — NURSING NOTE
Immunization Documentation    Prior to Immunization administration, verified patients identity using patient's name and date of birth. Please see IMMUNIZATIONS  and order for additional information.  Patient / Parent instructed to remain in clinic for 15 minutes and report any adverse reaction to staff immediately.    Was entire vial of medication used? Yes  Vial/Syringe: Alyssa Sloan LPN  12/6/2021   9:06 AM

## 2021-12-06 NOTE — NURSING NOTE
"Patient presents to the clinic for medication refills.      FOOD SECURITY SCREENING QUESTIONS  Hunger Vital Signs:  Within the past 12 months we worried whether our food would run out before we got money to buy more. Never  Within the past 12 months the food we bought just didn't last and we didn't have money to get more. Never    Advance Care Directive on file? yes  Advance Care Directive provided to patient?  yes     .  Lab Results   Component Value Date    A1C 6.0 09/02/2020    A1C 6.0 03/06/2019    A1C 5.8 04/05/2018    ALBUMIN 4.2 11/18/2020     Previous A1C is at goal of <8  Date of last Foot exam TODAY  Eye exam Yes- Date of last eye exam: Summer 2021,  [facility]  Patient is not a Tobacco User  Patient is on a daily aspirin  Patient is on a Statin.  Blood pressure today of:     BP Readings from Last 1 Encounters:   11/18/20 130/78      is at the goal of <139/89 for diabetics.  Chief Complaint   Patient presents with     Recheck Medication       Initial /75 (BP Location: Left arm, Patient Position: Sitting, Cuff Size: Adult Large)   Pulse 64   Temp (!) 96  F (35.6  C) (Tympanic)   Resp 16   Ht 1.803 m (5' 11\")   Wt 117 kg (258 lb)   SpO2 98%   BMI 35.98 kg/m   Estimated body mass index is 35.98 kg/m  as calculated from the following:    Height as of this encounter: 1.803 m (5' 11\").    Weight as of this encounter: 117 kg (258 lb).  Medication Reconciliation: complete        Fariba Sloan LPN          "

## 2021-12-06 NOTE — PROGRESS NOTES
"Nursing Notes:   Fariba Sloan LPN  12/6/2021  8:38 AM  Signed  Patient presents to the clinic for medication refills.      FOOD SECURITY SCREENING QUESTIONS  Hunger Vital Signs:  Within the past 12 months we worried whether our food would run out before we got money to buy more. Never  Within the past 12 months the food we bought just didn't last and we didn't have money to get more. Never    Advance Care Directive on file? yes  Advance Care Directive provided to patient?  yes     .  Lab Results   Component Value Date    A1C 6.0 09/02/2020    A1C 6.0 03/06/2019    A1C 5.8 04/05/2018    ALBUMIN 4.2 11/18/2020     Previous A1C is at goal of <8  Date of last Foot exam TODAY  Eye exam Yes- Date of last eye exam: Summer 2021,  [facility]  Patient is not a Tobacco User  Patient is on a daily aspirin  Patient is on a Statin.  Blood pressure today of:     BP Readings from Last 1 Encounters:   11/18/20 130/78      is at the goal of <139/89 for diabetics.  Chief Complaint   Patient presents with     Recheck Medication       Initial /75 (BP Location: Left arm, Patient Position: Sitting, Cuff Size: Adult Large)   Pulse 64   Temp (!) 96  F (35.6  C) (Tympanic)   Resp 16   Ht 1.803 m (5' 11\")   Wt 117 kg (258 lb)   SpO2 98%   BMI 35.98 kg/m   Estimated body mass index is 35.98 kg/m  as calculated from the following:    Height as of this encounter: 1.803 m (5' 11\").    Weight as of this encounter: 117 kg (258 lb).  Medication Reconciliation: complete        Fariba Sloan LPN               Assessment & Plan       ICD-10-CM    1. Essential hypertension  I10 lisinopril (ZESTRIL) 10 MG tablet     lisinopril-hydrochlorothiazide (ZESTORETIC) 20-25 MG tablet     metoprolol succinate ER (TOPROL-XL) 25 MG 24 hr tablet     Comprehensive metabolic panel     CBC with platelets     Comprehensive metabolic panel     CBC with platelets   2. Hyperlipidemia LDL goal <100  E78.5 atorvastatin (LIPITOR) 80 MG " tablet     Comprehensive metabolic panel     Lipid Profile     Comprehensive metabolic panel     Lipid Profile   3. Prediabetes  R73.03 blood glucose (NO BRAND SPECIFIED) test strip     blood glucose monitoring (ACCU-CHEK MULTICLIX) lancets     Hemoglobin A1c     Hemoglobin A1c   4. Numbness of foot  R20.0 Vitamin B12     TSH Reflex GH     Vitamin B12     TSH Reflex GH   5. Microalbuminuria  R80.9 Albumin Random Urine Quantitative with Creat Ratio     Albumin Random Urine Quantitative with Creat Ratio   6. Need for prophylactic vaccination and inoculation against influenza  Z23 INFLUENZA, QUAD, HIGH DOSE, PF, 65YR + (FLUZONE HD)   7. Need for vaccination against Streptococcus pneumoniae  Z23 GH IMM-  PNEUMOCOCCAL VACCINE,ADULT,SQ OR IM     Blood pressure controlled on current medications.  Labs look good.  Refilled lisinopril, hydrochlorothiazide, metoprolol at same doses.    Lipids stable, refilled atorvastatin    Blood glucose and A1c both in the prediabetes range.  Refilled test strips so he can check periodically at home.  Discussed potential value of low-carb diet, weight loss, or addition of Metformin.  He wanted to see results before considering options.  If not seeing any progress with weight loss, we can consider addition of Metformin.    TSH and B12 normal with periodic numbness.  Considered potential PAD on the left, but he does not have any claudication symptoms    Moderately elevated microalbuminuria, but not nephrotic range.  Not diabetic, so this does not appear to be the etiology.  Recheck this year.  If it remains elevated, may warrant some work-up.  Creatinine is normal, which is reassuring.    Given influenza vaccine and Pneumovax today.    Follow up 1 year, sooner if recheck desired on A1c    Calin Rubin MD     [facility]      SUBJECTIVE:  66 year old male presents to follow up on hypertension   Weight stable from last year    Tolerating current blood pressure  "medications    Feet feel numb at times with walking, mainly on the left. No constant numbness.  Has not noticed claudication pains in calf or feet.    Carries a prediabetes diagnosis, not diabetes. Blood sugars at home are usually below 110. Reading 122 this morning.    Microalbumin elevated last year      REVIEW OF SYSTEMS:    Constitutional: negative  Respiratory: negative  Cardiovascular: negative    Current Outpatient Medications   Medication Sig Dispense Refill     aspirin EC 81 MG EC tablet Take 81 mg by mouth daily with food       atorvastatin (LIPITOR) 80 MG tablet Take 1 tablet (80 mg) by mouth daily 90 tablet 0     blood glucose (NO BRAND SPECIFIED) test strip Use to test blood sugar 1 times daily. Dx: R73.03 100 strip 1     blood glucose monitoring (ACCU-CHEK ENEDINA PLUS) meter device kit Use to test blood sugar 1 times daily. Dx: R73.03 1 kit 0     blood glucose monitoring (ACCU-CHEK MULTICLIX) lancets Use to test blood sugar 1 times daily. Dx: R73.03 102 each 1     lisinopril (ZESTRIL) 10 MG tablet Take 1 tablet (10 mg) by mouth daily Along with combination lisinopril hydrochlorothiazide 20/25 for lisinopril 30 mg daily 90 tablet 0     lisinopril-hydrochlorothiazide (ZESTORETIC) 20-25 MG tablet Take 1 tablet by mouth daily 90 tablet 0     metoprolol succinate ER (TOPROL-XL) 25 MG 24 hr tablet Take 1 tablet (25 mg) by mouth daily 90 tablet 0     Multiple Vitamins-Minerals (MULTIVITAMIN & MINERAL PO) Take 1 tablet by mouth       nitroGLYcerin (NITROSTAT) 0.4 MG sublingual tablet PLACE 1 TABLET UNDER THE TONGUE EVERY 5 MINUTES IF NEEDED FOR CHEST PAIN. 25 tablet 0     Omega-3 Fatty Acids (FISH OIL PO) Take 2 capsules by mouth daily       No Known Allergies    OBJECTIVE:  /75 (BP Location: Left arm, Patient Position: Sitting, Cuff Size: Adult Large)   Pulse 64   Temp (!) 96  F (35.6  C) (Tympanic)   Resp 16   Ht 1.803 m (5' 11\")   Wt 117 kg (258 lb)   SpO2 98%   BMI 35.98 kg/m  "     EXAM:  General Appearance: Alert. No acute distress  Chest/Respiratory Exam: Clear to auscultation bilaterally  Cardiovascular Exam: Regular rate and rhythm. S1, S2, no murmur, gallop, or rubs.  Extremities: 1+ pedal pulses on right, trace on left.  No lower extremity edema.  Psychiatric: Normal affect and mentation        Results for orders placed or performed in visit on 12/06/21   Vitamin B12     Status: Normal   Result Value Ref Range    Vitamin B12 504 180 - 914 pg/mL   Comprehensive metabolic panel     Status: Abnormal   Result Value Ref Range    Sodium 137 134 - 144 mmol/L    Potassium 4.1 3.5 - 5.1 mmol/L    Chloride 100 98 - 107 mmol/L    Carbon Dioxide (CO2) 31 21 - 31 mmol/L    Anion Gap 6 3 - 14 mmol/L    Urea Nitrogen 25 7 - 25 mg/dL    Creatinine 1.19 0.70 - 1.30 mg/dL    Calcium 9.5 8.6 - 10.3 mg/dL    Glucose 121 (H) 70 - 105 mg/dL    Alkaline Phosphatase 54 34 - 104 U/L    AST 35 13 - 39 U/L    ALT 57 (H) 7 - 52 U/L    Protein Total 7.0 6.4 - 8.9 g/dL    Albumin 4.5 3.5 - 5.7 g/dL    Bilirubin Total 0.8 0.3 - 1.0 mg/dL    GFR Estimate 63 >60 mL/min/1.73m2   CBC with platelets     Status: Abnormal   Result Value Ref Range    WBC Count 7.1 4.0 - 11.0 10e3/uL    RBC Count 5.23 4.40 - 5.90 10e6/uL    Hemoglobin 17.4 13.3 - 17.7 g/dL    Hematocrit 50.1 40.0 - 53.0 %    MCV 96 78 - 100 fL    MCH 33.3 (H) 26.5 - 33.0 pg    MCHC 34.7 31.5 - 36.5 g/dL    RDW 12.2 10.0 - 15.0 %    Platelet Count 175 150 - 450 10e3/uL   Hemoglobin A1c     Status: Normal   Result Value Ref Range    Hemoglobin A1C 6.1 4.0 - 6.2 %   Lipid Profile     Status: None   Result Value Ref Range    Cholesterol 153 <200 mg/dL    Triglycerides 100 <150 mg/dL    Direct Measure HDL 43 23 - 92 mg/dL    LDL Cholesterol Calculated 90 <=100 mg/dL    Non HDL Cholesterol 110 <130 mg/dL    Patient Fasting > 8hrs? Yes     Narrative    Cholesterol  Desirable:  <200 mg/dL    Triglycerides  Normal:  Less than 150 mg/dL  Borderline High:  150-199  mg/dL  High:  200-499 mg/dL  Very High:  Greater than or equal to 500 mg/dL    Direct Measure HDL  Female:  Greater than or equal to 50 mg/dL   Male:  Greater than or equal to 40 mg/dL    LDL Cholesterol  Desirable:  <100mg/dL  Above Desirable:  100-129 mg/dL   Borderline High:  130-159 mg/dL   High:  160-189 mg/dL   Very High:  >= 190 mg/dL    Non HDL Cholesterol  Desirable:  130 mg/dL  Above Desirable:  130-159 mg/dL  Borderline High:  160-189 mg/dL  High:  190-219 mg/dL  Very High:  Greater than or equal to 220 mg/dL   TSH Reflex GH     Status: Normal   Result Value Ref Range    TSH 3.89 0.40 - 4.00 mU/L

## 2021-12-07 ASSESSMENT — ANXIETY QUESTIONNAIRES: GAD7 TOTAL SCORE: 0

## 2021-12-07 ASSESSMENT — PATIENT HEALTH QUESTIONNAIRE - PHQ9: SUM OF ALL RESPONSES TO PHQ QUESTIONS 1-9: 2

## 2022-09-17 ENCOUNTER — HEALTH MAINTENANCE LETTER (OUTPATIENT)
Age: 67
End: 2022-09-17

## 2022-12-14 ENCOUNTER — OFFICE VISIT (OUTPATIENT)
Dept: FAMILY MEDICINE | Facility: OTHER | Age: 67
End: 2022-12-14
Attending: FAMILY MEDICINE
Payer: COMMERCIAL

## 2022-12-14 VITALS
RESPIRATION RATE: 20 BRPM | WEIGHT: 250 LBS | SYSTOLIC BLOOD PRESSURE: 138 MMHG | OXYGEN SATURATION: 97 % | HEART RATE: 58 BPM | TEMPERATURE: 97 F | DIASTOLIC BLOOD PRESSURE: 78 MMHG | BODY MASS INDEX: 35 KG/M2 | HEIGHT: 71 IN

## 2022-12-14 DIAGNOSIS — E66.01 MORBID OBESITY (H): ICD-10-CM

## 2022-12-14 DIAGNOSIS — E78.5 HYPERLIPIDEMIA LDL GOAL <100: ICD-10-CM

## 2022-12-14 DIAGNOSIS — R73.03 PREDIABETES: ICD-10-CM

## 2022-12-14 DIAGNOSIS — I10 ESSENTIAL HYPERTENSION: ICD-10-CM

## 2022-12-14 DIAGNOSIS — N52.9 VASCULOGENIC ERECTILE DYSFUNCTION, UNSPECIFIED VASCULOGENIC ERECTILE DYSFUNCTION TYPE: ICD-10-CM

## 2022-12-14 DIAGNOSIS — R80.9 MICROALBUMINURIA: ICD-10-CM

## 2022-12-14 DIAGNOSIS — Z23 NEEDS FLU SHOT: ICD-10-CM

## 2022-12-14 DIAGNOSIS — Z12.5 SCREENING PSA (PROSTATE SPECIFIC ANTIGEN): ICD-10-CM

## 2022-12-14 DIAGNOSIS — N18.2 CKD (CHRONIC KIDNEY DISEASE) STAGE 2, GFR 60-89 ML/MIN: ICD-10-CM

## 2022-12-14 DIAGNOSIS — I25.10 ATHEROSCLEROSIS OF NATIVE CORONARY ARTERY OF NATIVE HEART WITHOUT ANGINA PECTORIS: ICD-10-CM

## 2022-12-14 DIAGNOSIS — Z00.00 ENCOUNTER FOR MEDICARE ANNUAL WELLNESS EXAM: Primary | ICD-10-CM

## 2022-12-14 LAB
ALBUMIN SERPL BCG-MCNC: 4.6 G/DL (ref 3.5–5.2)
ALP SERPL-CCNC: 63 U/L (ref 40–129)
ALT SERPL W P-5'-P-CCNC: 51 U/L (ref 10–50)
ANION GAP SERPL CALCULATED.3IONS-SCNC: 11 MMOL/L (ref 7–15)
AST SERPL W P-5'-P-CCNC: 39 U/L (ref 10–50)
BILIRUB SERPL-MCNC: 0.8 MG/DL
BUN SERPL-MCNC: 20.2 MG/DL (ref 8–23)
CALCIUM SERPL-MCNC: 9.5 MG/DL (ref 8.8–10.2)
CHLORIDE SERPL-SCNC: 99 MMOL/L (ref 98–107)
CHOLEST SERPL-MCNC: 139 MG/DL
CREAT SERPL-MCNC: 0.9 MG/DL (ref 0.67–1.17)
CREAT UR-MCNC: 173.7 MG/DL
DEPRECATED HCO3 PLAS-SCNC: 26 MMOL/L (ref 22–29)
ERYTHROCYTE [DISTWIDTH] IN BLOOD BY AUTOMATED COUNT: 12.2 % (ref 10–15)
GFR SERPL CREATININE-BSD FRML MDRD: >90 ML/MIN/1.73M2
GLUCOSE SERPL-MCNC: 115 MG/DL (ref 70–99)
HBA1C MFR BLD: 6.3 % (ref 4–6.2)
HCT VFR BLD AUTO: 48.1 % (ref 40–53)
HDLC SERPL-MCNC: 44 MG/DL
HGB BLD-MCNC: 16.7 G/DL (ref 13.3–17.7)
LDLC SERPL CALC-MCNC: 78 MG/DL
MCH RBC QN AUTO: 32.4 PG (ref 26.5–33)
MCHC RBC AUTO-ENTMCNC: 34.7 G/DL (ref 31.5–36.5)
MCV RBC AUTO: 93 FL (ref 78–100)
MICROALBUMIN UR-MCNC: 262.4 MG/L
MICROALBUMIN/CREAT UR: 151.07 MG/G CR (ref 0–17)
NONHDLC SERPL-MCNC: 95 MG/DL
PLATELET # BLD AUTO: 157 10E3/UL (ref 150–450)
POTASSIUM SERPL-SCNC: 4.1 MMOL/L (ref 3.4–5.3)
PROT SERPL-MCNC: 7.1 G/DL (ref 6.4–8.3)
PSA SERPL-MCNC: 2.47 NG/ML (ref 0–4.5)
RBC # BLD AUTO: 5.15 10E6/UL (ref 4.4–5.9)
SODIUM SERPL-SCNC: 136 MMOL/L (ref 136–145)
TRIGL SERPL-MCNC: 84 MG/DL
WBC # BLD AUTO: 6.9 10E3/UL (ref 4–11)

## 2022-12-14 PROCEDURE — G0463 HOSPITAL OUTPT CLINIC VISIT: HCPCS | Mod: 25

## 2022-12-14 PROCEDURE — G0103 PSA SCREENING: HCPCS | Mod: ZL | Performed by: FAMILY MEDICINE

## 2022-12-14 PROCEDURE — G0008 ADMIN INFLUENZA VIRUS VAC: HCPCS

## 2022-12-14 PROCEDURE — 36415 COLL VENOUS BLD VENIPUNCTURE: CPT | Mod: ZL | Performed by: FAMILY MEDICINE

## 2022-12-14 PROCEDURE — 85027 COMPLETE CBC AUTOMATED: CPT | Mod: ZL | Performed by: FAMILY MEDICINE

## 2022-12-14 PROCEDURE — 99213 OFFICE O/P EST LOW 20 MIN: CPT | Mod: 25 | Performed by: FAMILY MEDICINE

## 2022-12-14 PROCEDURE — 80061 LIPID PANEL: CPT | Mod: ZL | Performed by: FAMILY MEDICINE

## 2022-12-14 PROCEDURE — 83036 HEMOGLOBIN GLYCOSYLATED A1C: CPT | Mod: ZL | Performed by: FAMILY MEDICINE

## 2022-12-14 PROCEDURE — 82043 UR ALBUMIN QUANTITATIVE: CPT | Mod: ZL | Performed by: FAMILY MEDICINE

## 2022-12-14 PROCEDURE — 80053 COMPREHEN METABOLIC PANEL: CPT | Mod: ZL | Performed by: FAMILY MEDICINE

## 2022-12-14 PROCEDURE — G0439 PPPS, SUBSEQ VISIT: HCPCS | Performed by: FAMILY MEDICINE

## 2022-12-14 RX ORDER — ATORVASTATIN CALCIUM 80 MG/1
80 TABLET, FILM COATED ORAL DAILY
Qty: 90 TABLET | Refills: 4 | Status: SHIPPED | OUTPATIENT
Start: 2022-12-14 | End: 2023-07-28 | Stop reason: ALTCHOICE

## 2022-12-14 RX ORDER — METOPROLOL SUCCINATE 25 MG/1
25 TABLET, EXTENDED RELEASE ORAL DAILY
Qty: 90 TABLET | Refills: 4 | Status: SHIPPED | OUTPATIENT
Start: 2022-12-14 | End: 2023-05-08

## 2022-12-14 RX ORDER — NITROGLYCERIN 0.4 MG/1
TABLET SUBLINGUAL
Qty: 25 TABLET | Refills: 0 | Status: SHIPPED | OUTPATIENT
Start: 2022-12-14 | End: 2024-01-04

## 2022-12-14 RX ORDER — LISINOPRIL 10 MG/1
10 TABLET ORAL DAILY
Qty: 90 TABLET | Refills: 4 | Status: SHIPPED | OUTPATIENT
Start: 2022-12-14 | End: 2023-07-28 | Stop reason: DRUGHIGH

## 2022-12-14 RX ORDER — LISINOPRIL AND HYDROCHLOROTHIAZIDE 20; 25 MG/1; MG/1
1 TABLET ORAL DAILY
Qty: 90 TABLET | Refills: 4 | Status: SHIPPED | OUTPATIENT
Start: 2022-12-14 | End: 2023-05-08

## 2022-12-14 RX ORDER — SILDENAFIL 50 MG/1
50 TABLET, FILM COATED ORAL DAILY PRN
Qty: 10 TABLET | Refills: 11 | Status: SHIPPED | OUTPATIENT
Start: 2022-12-14 | End: 2023-10-04

## 2022-12-14 ASSESSMENT — ENCOUNTER SYMPTOMS
SORE THROAT: 0
ABDOMINAL PAIN: 0
FEVER: 0
HEARTBURN: 0
DIARRHEA: 0
EYE PAIN: 0
FREQUENCY: 0
NAUSEA: 0
SHORTNESS OF BREATH: 0
WEAKNESS: 0
PARESTHESIAS: 0
HEADACHES: 0
ARTHRALGIAS: 1
COUGH: 0
DYSURIA: 0
CONSTIPATION: 0
HEMATURIA: 0
DIZZINESS: 0
PALPITATIONS: 0
MYALGIAS: 0
CHILLS: 0
JOINT SWELLING: 0
HEMATOCHEZIA: 0
NERVOUS/ANXIOUS: 0

## 2022-12-14 ASSESSMENT — ANXIETY QUESTIONNAIRES
1. FEELING NERVOUS, ANXIOUS, OR ON EDGE: NOT AT ALL
8. IF YOU CHECKED OFF ANY PROBLEMS, HOW DIFFICULT HAVE THESE MADE IT FOR YOU TO DO YOUR WORK, TAKE CARE OF THINGS AT HOME, OR GET ALONG WITH OTHER PEOPLE?: NOT DIFFICULT AT ALL
6. BECOMING EASILY ANNOYED OR IRRITABLE: SEVERAL DAYS
7. FEELING AFRAID AS IF SOMETHING AWFUL MIGHT HAPPEN: NOT AT ALL
GAD7 TOTAL SCORE: 1
4. TROUBLE RELAXING: NOT AT ALL
GAD7 TOTAL SCORE: 1
IF YOU CHECKED OFF ANY PROBLEMS ON THIS QUESTIONNAIRE, HOW DIFFICULT HAVE THESE PROBLEMS MADE IT FOR YOU TO DO YOUR WORK, TAKE CARE OF THINGS AT HOME, OR GET ALONG WITH OTHER PEOPLE: NOT DIFFICULT AT ALL
5. BEING SO RESTLESS THAT IT IS HARD TO SIT STILL: NOT AT ALL
7. FEELING AFRAID AS IF SOMETHING AWFUL MIGHT HAPPEN: NOT AT ALL
GAD7 TOTAL SCORE: 1
2. NOT BEING ABLE TO STOP OR CONTROL WORRYING: NOT AT ALL
3. WORRYING TOO MUCH ABOUT DIFFERENT THINGS: NOT AT ALL

## 2022-12-14 ASSESSMENT — PATIENT HEALTH QUESTIONNAIRE - PHQ9
SUM OF ALL RESPONSES TO PHQ QUESTIONS 1-9: 4
10. IF YOU CHECKED OFF ANY PROBLEMS, HOW DIFFICULT HAVE THESE PROBLEMS MADE IT FOR YOU TO DO YOUR WORK, TAKE CARE OF THINGS AT HOME, OR GET ALONG WITH OTHER PEOPLE: SOMEWHAT DIFFICULT
SUM OF ALL RESPONSES TO PHQ QUESTIONS 1-9: 4

## 2022-12-14 ASSESSMENT — ACTIVITIES OF DAILY LIVING (ADL): CURRENT_FUNCTION: NO ASSISTANCE NEEDED

## 2022-12-14 ASSESSMENT — PAIN SCALES - GENERAL: PAINLEVEL: NO PAIN (0)

## 2022-12-14 NOTE — PROGRESS NOTES
"SUBJECTIVE:   Kirk is a 67 year old who presents for Preventive Visit.    Patient has been advised of split billing requirements and indicates understanding: Yes  Are you in the first 12 months of your Medicare coverage?  No    Healthy Habits:     In general, how would you rate your overall health?  Fair    Frequency of exercise:  2-3 days/week    Duration of exercise:  15-30 minutes    Do you usually eat at least 4 servings of fruit and vegetables a day, include whole grains    & fiber and avoid regularly eating high fat or \"junk\" foods?  No    Taking medications regularly:  Yes    Medication side effects:  None    Ability to successfully perform activities of daily living:  No assistance needed    Home Safety:  No safety concerns identified    Hearing Impairment:  Difficulty following a conversation in a noisy restaurant or crowded room    In the past 6 months, have you been bothered by leaking of urine?  No    In general, how would you rate your overall mental or emotional health?  Excellent      PHQ-2 Total Score: 0    Additional concerns today:  Yes      Have you ever done Advance Care Planning? (For example, a Health Directive, POLST, or a discussion with a medical provider or your loved ones about your wishes): Yes, advance care planning is on file.       Fall risk  Fallen 2 or more times in the past year?: No  Any fall with injury in the past year?: No    Cognitive Screening   1) Repeat 3 items (Leader, Season, Table)    2) Clock draw: NORMAL  3) 3 item recall: Recalls 3 objects  Results: 3 items recalled: COGNITIVE IMPAIRMENT LESS LIKELY    Mini-CogTM Copyright DANIA Ricardo. Licensed by the author for use in [facility]; reprinted with permission (jarvis@.[facility]). All rights reserved.      Do you have sleep apnea, excessive snoring or daytime drowsiness?: sometimes    Started exercising again  Gradually ramping up on treadmill      Reviewed and updated as needed this visit by clinical staff   Tobacco "  Allergies  Meds  Problems  Med Hx  Surg Hx  Fam Hx  Soc   Hx        Reviewed and updated as needed this visit by Provider   Tobacco  Allergies  Meds  Problems  Med Hx  Surg Hx  Fam Hx         Social History     Tobacco Use     Smoking status: Former     Types: Cigarettes     Quit date: 10/11/1984     Years since quittin.2     Passive exposure: Never     Smokeless tobacco: Never     Tobacco comments:     Last smoked over 40 years ago-early 80's   Substance Use Topics     Alcohol use: Not Currently     Comment: 4-8 beverages per month         Alcohol Use 2022   Prescreen: >3 drinks/day or >7 drinks/week? No               Current providers sharing in care for this patient include:   Patient Care Team:  Calin Rubin MD as PCP - General (Family Medicine)  Calin Rubin MD as Assigned PCP    The following health maintenance items are reviewed in Epic and correct as of today:  Health Maintenance   Topic Date Due     ZOSTER IMMUNIZATION (2 of 3) 2018     INFLUENZA VACCINE (1) 2022     DTAP/TDAP/TD IMMUNIZATION (3 - Td or Tdap) 2023     MEDICARE ANNUAL WELLNESS VISIT  2023     FALL RISK ASSESSMENT  2023     COLORECTAL CANCER SCREENING  2026     LIPID  2026     ADVANCE CARE PLANNING  2027     HEPATITIS C SCREENING  Completed     PHQ-2 (once per calendar year)  Completed     Pneumococcal Vaccine: 65+ Years  Completed     AORTIC ANEURYSM SCREENING (SYSTEM ASSIGNED)  Completed     COVID-19 Vaccine  Completed     IPV IMMUNIZATION  Aged Out     MENINGITIS IMMUNIZATION  Aged Out     Patient Active Problem List   Diagnosis     Coronary atherosclerosis     Degeneration of intervertebral disc     Diverticulosis of sigmoid colon     Psychosexual dysfunction with inhibited sexual excitement     Health care maintenance     Hip pain     Hypercholesterolemia     Tick bite     Prediabetes     Advance Care Planning     Past Surgical History:   Procedure  Laterality Date     ARTHROPLASTY HIP      2007,Left total hip with Dr Graham Escalante     COLONOSCOPY      6/3/05,Colonoscopy, normal.  F/u in 10 years     COLONOSCOPY  2016    Normal exam,  F/U      CV CARDIAC CATHERIZATION  2012    proximal circumflex, [facility]     RELEASE CARPAL TUNNEL      ,Right carpal tunnel sugery     TONSILLECTOMY, ADENOIDECTOMY, COMBINED      T&A as a child       Social History     Tobacco Use     Smoking status: Former     Types: Cigarettes     Quit date: 10/11/1984     Years since quittin.2     Passive exposure: Never     Smokeless tobacco: Never     Tobacco comments:     Last smoked over 40 years ago-early    Substance Use Topics     Alcohol use: Not Currently     Comment: 4-8 beverages per month     Family History   Problem Relation Age of Onset     Cancer Father         Cancer,Lung     Peripheral Vascular Disease Father      Heart Disease Mother 70        Heart Disease,CAD/MI     Pacemaker Sister      Heart Disease Maternal Grandfather 60        Heart Disease,CAD     Heart Disease Maternal Uncle 60        Heart Disease,CAD     No Known Problems Sister      Diabetes No family hx of      Prostate Cancer No family hx of      Colon Cancer No family hx of          Current Outpatient Medications   Medication Sig Dispense Refill     aspirin EC 81 MG EC tablet Take 81 mg by mouth daily with food       atorvastatin (LIPITOR) 80 MG tablet Take 1 tablet (80 mg) by mouth daily 90 tablet 4     blood glucose (NO BRAND SPECIFIED) test strip Use to test blood sugar 1 times daily. Dx: R73.03 100 strip 3     blood glucose monitoring (ACCU-CHEK ENEDINA PLUS) meter device kit Use to test blood sugar 1 times daily. Dx: R73.03 1 kit 0     blood glucose monitoring (ACCU-CHEK MULTICLIX) lancets Use to test blood sugar 1 times daily. Dx: R73.03 102 each 3     lisinopril (ZESTRIL) 10 MG tablet Take 1 tablet (10 mg) by mouth daily Along with combination lisinopril hydrochlorothiazide  "20/25 for lisinopril 30 mg daily 90 tablet 4     lisinopril-hydrochlorothiazide (ZESTORETIC) 20-25 MG tablet Take 1 tablet by mouth daily 90 tablet 4     metoprolol succinate ER (TOPROL-XL) 25 MG 24 hr tablet Take 1 tablet (25 mg) by mouth daily 90 tablet 4     Multiple Vitamins-Minerals (MULTIVITAMIN & MINERAL PO) Take 1 tablet by mouth       nitroGLYcerin (NITROSTAT) 0.4 MG sublingual tablet PLACE 1 TABLET UNDER THE TONGUE EVERY 5 MINUTES IF NEEDED FOR CHEST PAIN. 25 tablet 0     Omega-3 Fatty Acids (FISH OIL PO) Take 2 capsules by mouth daily       No Known Allergies    Review of Systems   Constitutional: Negative for chills and fever.   HENT: Positive for congestion. Negative for ear pain, hearing loss and sore throat.    Eyes: Negative for pain and visual disturbance.   Respiratory: Negative for cough and shortness of breath.    Cardiovascular: Negative for chest pain, palpitations and peripheral edema.   Gastrointestinal: Negative for abdominal pain, constipation, diarrhea, heartburn, hematochezia and nausea.   Genitourinary: Positive for impotence. Negative for dysuria, frequency, genital sores, hematuria, penile discharge and urgency.   Musculoskeletal: Positive for arthralgias. Negative for joint swelling and myalgias.   Skin: Negative for rash.   Neurological: Negative for dizziness, weakness, headaches and paresthesias.   Psychiatric/Behavioral: Negative for mood changes. The patient is not nervous/anxious.          OBJECTIVE:   /78 (BP Location: Right arm, Patient Position: Sitting, Cuff Size: Adult Large)   Pulse 58   Temp 97  F (36.1  C) (Tympanic)   Resp 20   Ht 1.791 m (5' 10.5\")   Wt 113.4 kg (250 lb)   SpO2 97%   BMI 35.36 kg/m   Estimated body mass index is 35.36 kg/m  as calculated from the following:    Height as of this encounter: 1.791 m (5' 10.5\").    Weight as of this encounter: 113.4 kg (250 lb).  Physical Exam  General Appearance: Pleasant, alert, appropriate appearance for " age. No acute distress  Eye Exam:  Normal external eye, conjunctiva, lids, cornea. DARIUS.  Ear Exam: Normal TM's bilaterally. Normal auditory canals and external ears.  OroPharynx Exam:  Dental hygiene adequate. Normal buccal mucosa. Normal pharynx.  Neck Exam:  Supple, no masses or nodes.  Thyroid Exam: No nodules or enlargement.  Chest/Respiratory Exam: Normal chest wall and respirations. Clear to auscultation.  Cardiovascular Exam: Regular rate and rhythm. S1, S2, no murmur, click, gallop, or rubs.  Gastrointestinal Exam: Soft, non-tender, no masses or organomegaly.  Musculoskeletal Exam: Back is straight and non-tender, full ROM of upper and lower extremities.  Foot Exam: Left and right foot: good pedal pulses.  Skin: no rash or abnormalities  Neurologic Exam: Nonfocal, symmetric DTRs, normal gross motor, tone coordination and no tremor.  Psychiatric Exam: Alert and oriented - appropriate affect.      Results for orders placed or performed in visit on 12/14/22   Lipid Profile     Status: Normal   Result Value Ref Range    Cholesterol 139 <200 mg/dL    Triglycerides 84 <150 mg/dL    Direct Measure HDL 44 >=40 mg/dL    LDL Cholesterol Calculated 78 <=100 mg/dL    Non HDL Cholesterol 95 <130 mg/dL    Narrative    Cholesterol  Desirable:  <200 mg/dL    Triglycerides  Normal:  Less than 150 mg/dL  Borderline High:  150-199 mg/dL  High:  200-499 mg/dL  Very High:  Greater than or equal to 500 mg/dL    Direct Measure HDL  Female:  Greater than or equal to 50 mg/dL   Male:  Greater than or equal to 40 mg/dL    LDL Cholesterol  Desirable:  <100mg/dL  Above Desirable:  100-129 mg/dL   Borderline High:  130-159 mg/dL   High:  160-189 mg/dL   Very High:  >= 190 mg/dL    Non HDL Cholesterol  Desirable:  130 mg/dL  Above Desirable:  130-159 mg/dL  Borderline High:  160-189 mg/dL  High:  190-219 mg/dL  Very High:  Greater than or equal to 220 mg/dL   Hemoglobin A1c     Status: Abnormal   Result Value Ref Range    Hemoglobin A1C  6.3 (H) 4.0 - 6.2 %   CBC with platelets     Status: Normal   Result Value Ref Range    WBC Count 6.9 4.0 - 11.0 10e3/uL    RBC Count 5.15 4.40 - 5.90 10e6/uL    Hemoglobin 16.7 13.3 - 17.7 g/dL    Hematocrit 48.1 40.0 - 53.0 %    MCV 93 78 - 100 fL    MCH 32.4 26.5 - 33.0 pg    MCHC 34.7 31.5 - 36.5 g/dL    RDW 12.2 10.0 - 15.0 %    Platelet Count 157 150 - 450 10e3/uL   Comprehensive metabolic panel     Status: Abnormal   Result Value Ref Range    Sodium 136 136 - 145 mmol/L    Potassium 4.1 3.4 - 5.3 mmol/L    Chloride 99 98 - 107 mmol/L    Carbon Dioxide (CO2) 26 22 - 29 mmol/L    Anion Gap 11 7 - 15 mmol/L    Urea Nitrogen 20.2 8.0 - 23.0 mg/dL    Creatinine 0.90 0.67 - 1.17 mg/dL    Calcium 9.5 8.8 - 10.2 mg/dL    Glucose 115 (H) 70 - 99 mg/dL    Alkaline Phosphatase 63 40 - 129 U/L    AST 39 10 - 50 U/L    ALT 51 (H) 10 - 50 U/L    Protein Total 7.1 6.4 - 8.3 g/dL    Albumin 4.6 3.5 - 5.2 g/dL    Bilirubin Total 0.8 <=1.2 mg/dL    GFR Estimate >90 >60 mL/min/1.73m2   Albumin Random Urine Quantitative with Creat Ratio     Status: Abnormal   Result Value Ref Range    Albumin Urine mg/L 262.4 mg/L    Albumin Urine mg/g Cr 151.07 (H) 0.00 - 17.00 mg/g Cr    Creatinine Urine mg/dL 173.7 mg/dL        ASSESSMENT / PLAN:       ICD-10-CM    1. Encounter for Medicare annual wellness exam  Z00.00       2. Essential hypertension  I10 metoprolol succinate ER (TOPROL XL) 25 MG 24 hr tablet     lisinopril-hydrochlorothiazide (ZESTORETIC) 20-25 MG tablet     lisinopril (ZESTRIL) 10 MG tablet     nitroGLYcerin (NITROSTAT) 0.4 MG sublingual tablet     Comprehensive metabolic panel     CBC with platelets     CBC with platelets     Comprehensive metabolic panel      3. CKD (chronic kidney disease) stage 2, GFR 60-89 ml/min  N18.2 Comprehensive metabolic panel     Albumin Random Urine Quantitative with Creat Ratio     Comprehensive metabolic panel     Albumin Random Urine Quantitative with Creat Ratio      4. Microalbuminuria   R80.9 Albumin Random Urine Quantitative with Creat Ratio     Albumin Random Urine Quantitative with Creat Ratio      5. Hyperlipidemia LDL goal <100  E78.5 atorvastatin (LIPITOR) 80 MG tablet     Comprehensive metabolic panel     Lipid Profile     Lipid Profile     Comprehensive metabolic panel      6. Prediabetes  R73.03 blood glucose (NO BRAND SPECIFIED) test strip     blood glucose monitoring (ACCU-CHEK MULTICLIX) lancets     Hemoglobin A1c     Hemoglobin A1c      7. Morbid obesity (H)  E66.01       8. Atherosclerosis of native coronary artery of native heart without angina pectoris  I25.10       9. Vasculogenic erectile dysfunction, unspecified vasculogenic erectile dysfunction type  N52.9 sildenafil (VIAGRA) 50 MG tablet      10. Needs flu shot  Z23 INFLUENZA VACCINE 65+ (FLUZONE HD)            Patient has been advised of split billing requirements and indicates understanding: Yes    Blood pressure controlled.  Stable microalbuminuria with CKD 2.  On ACE.  Refilled lisinopril hydrochlorothiazide for same doses    Lipids controlled on atorvastatin.  Refilled    A1c has increased to 6.3%.  He started an exercise program, which should help.  Weight loss would help prevent progression to diabetes.  Plan to recheck A1c next year, sooner if he desires.    Obesity with BMI of 35.4 contributes to hypertension, hyperlipidemia, prediabetes.    History of cardiac stent.  Stable CAD.  On appropriate medications including aspirin, statin, blood pressure management.    Has noticed difficulty sustaining an erection.  Remotely he took sildenafil.  Can try sildenafil 50 mg as needed.  Is aware this should not be utilized with nitroglycerin as it can cause significant hypotension.    COUNSELING:  Reviewed preventive health counseling, as reflected in patient instructions       Regular exercise       Healthy diet/nutrition       Vision screening       Hearing screening       Immunizations    Vaccinated for: Influenza      Obtain  "Shingrix from a pharmacy       Colon cancer screening -due 2026       Prostate cancer screening -last in 2019, updated      BMI:   Estimated body mass index is 35.36 kg/m  as calculated from the following:    Height as of this encounter: 1.791 m (5' 10.5\").    Weight as of this encounter: 113.4 kg (250 lb).   Weight management plan: Discussed healthy diet and exercise guidelines      He reports that he quit smoking about 38 years ago. His smoking use included cigarettes. He has never been exposed to tobacco smoke. He has never used smokeless tobacco.      Appropriate preventive services were discussed with this patient, including applicable screening as appropriate for cardiovascular disease, diabetes, osteopenia/osteoporosis, and glaucoma.  As appropriate for age/gender, discussed screening for colorectal cancer, prostate cancer, breast cancer, and cervical cancer. Checklist reviewing preventive services available has been given to the patient.    Reviewed patients plan of care and provided an AVS. The Basic Care Plan (routine screening as documented in Health Maintenance) for Kirk meets the Care Plan requirement. This Care Plan has been established and reviewed with the Patient.          Calin Rubin MD  [facility]    Identified Health Risks:  Answers for HPI/ROS submitted by the patient on 12/14/2022  If you checked off any problems, how difficult have these problems made it for you to do your work, take care of things at home, or get along with other people?: Somewhat difficult  PHQ9 TOTAL SCORE: 4  BRANDO 7 TOTAL SCORE: 1        The patient was provided with suggestions to help him develop a healthy physical lifestyle.  The patient was counseled and encouraged to consider modifying their diet and eating habits. He was provided with information on recommended healthy diet options.  The patient was provided with written information regarding signs of hearing loss.  "

## 2022-12-14 NOTE — NURSING NOTE
"Patient presents to the clinic for medicare wellness.    FOOD SECURITY SCREENING QUESTIONS:    The next two questions are to help us understand your food security.  If you are feeling you need any assistance in this area, we have resources available to support you today.    Hunger Vital Signs:  Within the past 12 months we worried whether our food would run out before we got money to buy more. Never  Within the past 12 months the food we bought just didn't last and we didn't have money to get more. Never    Advance Care Directive on file? yes  Advance Care Directive provided to patient? yes    Chief Complaint   Patient presents with     Medicare Visit     Wellness         Initial /78 (BP Location: Right arm, Patient Position: Sitting, Cuff Size: Adult Large)   Pulse 58   Temp 97  F (36.1  C) (Tympanic)   Resp 20   Ht 1.791 m (5' 10.5\")   Wt 113.4 kg (250 lb)   SpO2 97%   BMI 35.36 kg/m   Estimated body mass index is 35.36 kg/m  as calculated from the following:    Height as of this encounter: 1.791 m (5' 10.5\").    Weight as of this encounter: 113.4 kg (250 lb).  Medication Reconciliation: complete        Fariba Sloan LPN       "

## 2022-12-14 NOTE — PATIENT INSTRUCTIONS
Look up trochanteric bursitis exercises and stretches  Do not take sildenafil and nitroglycerin within several hours of each other  Shingrix from the pharmacy      Patient Education   Personalized Prevention Plan  You are due for the preventive services outlined below.  Your care team is available to assist you in scheduling these services.  If you have already completed any of these items, please share that information with your care team to update in your medical record.  Health Maintenance Due   Topic Date Due    Zoster (Shingles) Vaccine (2 of 3) 02/14/2018    Flu Vaccine (1) 09/01/2022     Your Health Risk Assessment indicates you feel you are not in good health    A healthy lifestyle helps keep the body fit and the mind alert. It helps protect you from disease, helps you fight disease, and helps prevent chronic disease (disease that doesn't go away) from getting worse. This is important as you get older and begin to notice twinges in muscles and joints and a decline in the strength and stamina you once took for granted. A healthy lifestyle includes good healthcare, good nutrition, weight control, recreation, and regular exercise. Avoid harmful substances and do what you can to keep safe. Another part of a healthy lifestyle is stay mentally active and socially involved.    Good healthcare   Have a wellness visit every year.   If you have new symptoms, let us know right away. Don't wait until the next checkup.   Take medicines exactly as prescribed and keep your medicines in a safe place. Tell us if your medicine causes problems.   Healthy diet and weight control   Eat 3 or 4 small, nutritious, low-fat, high-fiber meals a day. Include a variety of fruits, vegetables, and whole-grain foods.   Make sure you get enough calcium in your diet. Calcium, vitamin D, and exercise help prevent osteoporosis (bone thinning).   If you live alone, try eating with others when you can. That way you get a good meal and have  company while you eat it.   Try to keep a healthy weight. If you eat more calories than your body uses for energy, it will be stored as fat and you will gain weight.     Recreation   Recreation is not limited to sports and team events. It includes any activity that provides relaxation, interest, enjoyment, and exercise. Recreation provides an outlet for physical, mental, and social energy. It can give a sense of worth and achievement. It can help you stay healthy.    Mental Exercise and Social Involvement  Mental and emotional health is as important as physical health. Keep in touch with friends and family. Stay as active as possible. Continue to learn and challenge yourself.   Things you can do to stay mentally active are:  Learn something new, like a foreign language or musical instrument.   Play SCRABBLE or do crossword puzzles. If you cannot find people to play these games with you at home, you can play them with others on your computer through the Internet.   Join a games club--anything from card games to chess or checkers or lawn bowling.   Start a new hobby.   Go back to school.   Volunteer.   Read.   Keep up with world events.    Understanding USDA MyPlate  The USDA has guidelines to help you make healthy food choices. These are called MyPlate. MyPlate shows the food groups that make up healthy meals using the image of a place setting. Before you eat, think about the healthiest choices for what to put on your plate or in your cup or bowl. To learn more about building a healthy plate, visit www.choosemyplate.gov.    The food groups  Fruits. Any fruit or 100% fruit juice counts as part of the Fruit Group. Fruits may be fresh, canned, frozen, or dried, and may be whole, cut-up, or pureed. Make 1/2 of your plate fruits and vegetables.  Vegetables. Any vegetable or 100% vegetable juice counts as a member of the Vegetable Group. Vegetables may be fresh, frozen, canned, or dried. They can be served raw or cooked  and may be whole, cut-up, or mashed. Make 1/2 of your plate fruits and vegetables.  Grains. All foods made from grains are part of the Grains Group. These include wheat, rice, oats, cornmeal, and barley. Grains are often used to make foods such as bread, pasta, oatmeal, cereal, tortillas, and grits. Grains should be no more than 1/4 of your plate. At least half of your grains should be whole grains.  Protein. This group includes meat, poultry, seafood, beans and peas, eggs, processed soy products (such as tofu), nuts (including nut butters), and seeds. Make protein choices no more than 1/4 of your plate. Meat and poultry choices should be lean or low fat.  Dairy. The Dairy Group includes all fluid milk products and foods made from milk that contain calcium, such as yogurt and cheese. (Foods that have little calcium, such as cream, butter, and cream cheese, are not part of this group.) Most dairy choices should be low-fat or fat-free.  Oils. Oils aren't a food group, but they do contain essential nutrients. However it's important to watch your intake of oils. These are fats that are liquid at room temperature. They include canola, corn, olive, soybean, vegetable, and sunflower oil. Foods that are mainly oil include mayonnaise, certain salad dressings, and soft margarines. You likely already get your daily oil allowance from the foods you eat.  Things to limit  Eating healthy also means limiting these things in your diet:     Salt (sodium). Many processed foods have a lot of sodium. To keep sodium intake down, eat fresh vegetables, meats, poultry, and seafood when possible. Purchase low-sodium, reduced-sodium, or no-salt-added food products at the store. And don't add salt to your meals at home. Instead, season them with herbs and spices such as dill, oregano, cumin, and paprika. Or try adding flavor with lemon or lime zest and juice.  Saturated fat. Saturated fats are most often found in animal products such as beef,  pork, and chicken. They are often solid at room temperature, such as butter. To reduce your saturated fat intake, choose leaner cuts of meat and poultry. And try healthier cooking methods such as grilling, broiling, roasting, or baking. For a simple lower-fat swap, use plain nonfat yogurt instead of mayonnaise when making potato salad or macaroni salad.  Added sugars. These are sugars added to foods. They are in foods such as ice cream, candy, soda, fruit drinks, sports drinks, energy drinks, cookies, pastries, jams, and syrups. Cut down on added sugars by sharing sweet treats with a family member or friend. You can also choose fruit for dessert, and drink water or other unsweetened beverages.     [employer] last reviewed this educational content on 6/1/2020 2000-2021 [employer]. All rights reserved. This information is not intended as a substitute for professional medical care. Always follow your healthcare professional's instructions.          Signs of Hearing Loss      Hearing much better with one ear can be a sign of hearing loss.   Hearing loss is a problem shared by many people. In fact, it is one of the most common health problems, particularly as people age. Most people age 65 and older have some hearing loss. By age 80, almost everyone does. Hearing loss often occurs slowly over the years. So you may not realize your hearing has gotten worse.  Have your hearing checked  Call your healthcare provider if you:  Have to strain to hear normal conversation  Have to watch other people s faces very carefully to follow what they re saying  Need to ask people to repeat what they ve said  Often misunderstand what people are saying  Turn the volume of the television or radio up so high that others complain  Feel that people are mumbling when they re talking to you  Find that the effort to hear leaves you feeling tired and irritated  Notice, when using the phone, that you hear better with one ear than  the other  [employer] last reviewed this educational content on 1/1/2020 2000-2021 [employer]. All rights reserved. This information is not intended as a substitute for professional medical care. Always follow your healthcare professional's instructions.

## 2022-12-15 DIAGNOSIS — R73.03 PREDIABETES: Primary | ICD-10-CM

## 2022-12-15 NOTE — TELEPHONE ENCOUNTER
"Fax received from [facility] of [place], with message regarding:    blood glucose (NO BRAND SPECIFIED) test strip 100 strip 3 12/14/2022  No   Sig: Use to test blood sugar 1 times daily. Dx: R73.03     blood glucose monitoring (ACCU-CHEK MULTICLIX) lancets 102 each 3 12/14/2022  No   Sig: Use to test blood sugar 1 times daily. Dx: R73.03     'Missing/Illegible information on Rx- Further clarification: Dx code on hard copy not covered by Medicare Part B.\"    Dx: R73.03- Prediabetes.    Provider to associated diagnosis.    Fanta Hilton RN .............. 12/15/2022  11:27 AM  "

## 2022-12-16 NOTE — TELEPHONE ENCOUNTER
"Called [facility] and spoke with Gm (pharmacist), after verifying Pt's last name and . He was informed of information below. He states, \"I have been doing this 42 years and never submitted testing supplies under prediabetes. My lead tech Radha will be here Monday and she is really good at this stuff.\" Gm is requesting call back on Monday. Fanta Hilton RN .............. 2022  5:01 PM  "

## 2022-12-19 NOTE — TELEPHONE ENCOUNTER
Called [facility] and spoke with pharmacy technician Radha, after verifying Pt's last name and . She states Medicare does not cover prediabetes, so the only thing they can do is run it through Pt's other insurance. Other insurance requires [employer]. Radha requesting new prescriptions for generic meter and lancets. Lawrence'd up as requested. Fanta Hilton RN .............. 2022  10:18 AM

## 2023-03-29 ENCOUNTER — MYC MEDICAL ADVICE (OUTPATIENT)
Dept: FAMILY MEDICINE | Facility: OTHER | Age: 68
End: 2023-03-29
Payer: COMMERCIAL

## 2023-03-29 NOTE — TELEPHONE ENCOUNTER
"See MyChart message below from patient.    Writer did speak with patient on phone. States no other symptoms occurred such as headache, dizziness, nausea, etc. Denies any head injury. Due to lack of other symptoms and causes, patient believes it to be due to \"I was in a dark room facing a very bright window\" but does feel more comfortable with concern being brought up with PCP. Please advise on any other recommendations    Corinne R Thayer, RN on 3/29/2023 at 9:29 AM      "

## 2023-03-29 NOTE — TELEPHONE ENCOUNTER
He should be seen in clinic to evaluate. Likely needs MRI, MRA, ESR, CRP as evaluation. Please call to see if he can see Erica Hackett tomorrow ([place])

## 2023-03-30 ENCOUNTER — OFFICE VISIT (OUTPATIENT)
Dept: FAMILY MEDICINE | Facility: OTHER | Age: 68
End: 2023-03-30
Attending: PHYSICIAN ASSISTANT
Payer: COMMERCIAL

## 2023-03-30 VITALS
TEMPERATURE: 97.5 F | HEART RATE: 72 BPM | BODY MASS INDEX: 35.87 KG/M2 | WEIGHT: 253.6 LBS | RESPIRATION RATE: 20 BRPM | OXYGEN SATURATION: 97 % | DIASTOLIC BLOOD PRESSURE: 84 MMHG | SYSTOLIC BLOOD PRESSURE: 132 MMHG

## 2023-03-30 DIAGNOSIS — H53.121 TRANSIENT VISUAL LOSS, RIGHT EYE: ICD-10-CM

## 2023-03-30 DIAGNOSIS — M25.40 JOINT SWELLING: ICD-10-CM

## 2023-03-30 DIAGNOSIS — H53.9 VISION CHANGES: Primary | ICD-10-CM

## 2023-03-30 LAB
BASOPHILS # BLD AUTO: 0 10E3/UL (ref 0–0.2)
BASOPHILS NFR BLD AUTO: 0 %
CRP SERPL-MCNC: 4.24 MG/L
EOSINOPHIL # BLD AUTO: 0 10E3/UL (ref 0–0.7)
EOSINOPHIL NFR BLD AUTO: 1 %
ERYTHROCYTE [DISTWIDTH] IN BLOOD BY AUTOMATED COUNT: 12.3 % (ref 10–15)
ERYTHROCYTE [SEDIMENTATION RATE] IN BLOOD BY WESTERGREN METHOD: 10 MM/HR (ref 0–20)
HCT VFR BLD AUTO: 45.8 % (ref 40–53)
HGB BLD-MCNC: 16 G/DL (ref 13.3–17.7)
IMM GRANULOCYTES # BLD: 0 10E3/UL
IMM GRANULOCYTES NFR BLD: 0 %
LYMPHOCYTES # BLD AUTO: 1.8 10E3/UL (ref 0.8–5.3)
LYMPHOCYTES NFR BLD AUTO: 30 %
MCH RBC QN AUTO: 32.6 PG (ref 26.5–33)
MCHC RBC AUTO-ENTMCNC: 34.9 G/DL (ref 31.5–36.5)
MCV RBC AUTO: 93 FL (ref 78–100)
MONOCYTES # BLD AUTO: 0.2 10E3/UL (ref 0–1.3)
MONOCYTES NFR BLD AUTO: 4 %
NEUTROPHILS # BLD AUTO: 4 10E3/UL (ref 1.6–8.3)
NEUTROPHILS NFR BLD AUTO: 65 %
NRBC # BLD AUTO: 0 10E3/UL
NRBC BLD AUTO-RTO: 0 /100
PLATELET # BLD AUTO: 188 10E3/UL (ref 150–450)
RBC # BLD AUTO: 4.91 10E6/UL (ref 4.4–5.9)
WBC # BLD AUTO: 6.1 10E3/UL (ref 4–11)

## 2023-03-30 PROCEDURE — 85025 COMPLETE CBC W/AUTO DIFF WBC: CPT | Mod: ZL | Performed by: PHYSICIAN ASSISTANT

## 2023-03-30 PROCEDURE — 99213 OFFICE O/P EST LOW 20 MIN: CPT | Performed by: PHYSICIAN ASSISTANT

## 2023-03-30 PROCEDURE — 86140 C-REACTIVE PROTEIN: CPT | Mod: ZL | Performed by: PHYSICIAN ASSISTANT

## 2023-03-30 PROCEDURE — G0463 HOSPITAL OUTPT CLINIC VISIT: HCPCS

## 2023-03-30 PROCEDURE — 86431 RHEUMATOID FACTOR QUANT: CPT | Mod: ZL | Performed by: PHYSICIAN ASSISTANT

## 2023-03-30 PROCEDURE — 36415 COLL VENOUS BLD VENIPUNCTURE: CPT | Mod: ZL | Performed by: PHYSICIAN ASSISTANT

## 2023-03-30 PROCEDURE — 85652 RBC SED RATE AUTOMATED: CPT | Mod: ZL | Performed by: PHYSICIAN ASSISTANT

## 2023-03-30 ASSESSMENT — PAIN SCALES - GENERAL: PAINLEVEL: NO PAIN (0)

## 2023-03-30 ASSESSMENT — ENCOUNTER SYMPTOMS: EYE PAIN: 1

## 2023-03-30 NOTE — PATIENT INSTRUCTIONS
MRI brain with and without contrast ordered  MRI of orbits/eyes with and without contrast ordered  MRA of brain to check blood flow ordered    Lab work ordered:   - CBC - check blood counts  - Rheumatoid factor - due to family history and swelling of joints  - ESR and CRP - non specific inflammatory markers

## 2023-03-30 NOTE — PROGRESS NOTES
Assessment & Plan     1. Vision changes  2. Transient visual loss, right eye  - CBC and Differential  - MR Brain w/o & w Contrast; Future  - MR Orbit w/o & w Contrast; Future  - MRA Brain ([place]) wo Contrast; Future  - Single episode of transient vision changes/loss. Medical history notable for hyperlipidemia, hypertension (controlled on current regimen), history of cardiac stent placement and prediabetes. Reassuring that his last eye examination is normal, however, did discuss that this episode did occur after this. I recommend additional imaging evaluation of the brain, orbits and an MRA as well to rule out infarct and other acute/chronic pathology. He will be contacted to schedule this. Plan discussed with PCP prior to visit. Will update patient on results and recommendations at these return. Recommend prompt evaluation if changing or worsening symptoms occur. Patient is in agreement and understanding of the above treatment plan. All questions and concerns were addressed and answered to patient's satisfaction. AVS reviewed with patient.     3. Joint swelling  - Sedimentation Rate (ESR)  - Rheumatoid factor  - CRP inflammation  - Intermittent joint swelling and familial history of RA, will test as rule out diagnosis, reviewed UTD guidelines.   - ESR and CRP normal at 10 and 4.24 respectively.   - CBC - normal WBC and RBC indices. No signs of acute hypercoagulable state.     Return for MRI ordered, you will be contacted to schedule.    Erica Hackett PA-C  [facility] [place]    Ivelisse Bradley is a 67 year old, presenting for the following health issues:  Eye Problem (Vision changes right eye )  No flowsheet data found.  Eye Problem     History of Present Illness       Reason for visit:  Temporary blindness in right eye  Symptom onset:  1-3 days ago  Symptoms include:  Temporary blindness in right eye for a few ninutes late afternoon on March 28  Symptom intensity:  Mild  Symptom  "progression:  Improving  Had these symptoms before:  No  What makes it worse:  No  What makes it better:  No    He eats 2-3 servings of fruits and vegetables daily.He consumes 0 sweetened beverage(s) daily.He exercises with enough effort to increase his heart rate 20 to 29 minutes per day.  He exercises with enough effort to increase his heart rate 3 or less days per week.   He is taking medications regularly.     Patient presenting with his wife today after having an episode of vision loss/abnormality involving his right eye on Tuesday, 3/28/23. He states the episode occurred around 445 pm while talking a colleague in a dark room with a brightly lit/brightly shining window. He states during their conversation his vision in his right eye suddenly went \"black to purple with a little bit of white in the visual field\" which lasted approximately 2 minutes, then resolved. He denied any pain during/after the episode and/or any pain currently. He has no history of similar symptoms. No prior history of TIA/CVA. He declines any numbness, tingling, burning, facial droop, vision loss to left eye, hearing changes, balance changes, headaches, memory/cognition changes, etc. He does wear contacts and his last eye examination occurred less than a month ago, all returning normal. He denies any presence of symptoms similar to that with [place] syndrome (rash, neuritis, etc.). He declines presence of flashers/floaters, eye lid symptoms (swelling, etc.), dental or jaw pain. No exposure to injury or trauma of eye (mechanical or chemical trauma). He has been fighting off a URI (congestion, sinus pain/pressure) for approximately the last 10 days. He does also endorse a fall on ice approximately 10 days ago, he was walking outside to his truck and slid on the ice, he landed on his left side, he did not hit his head or lose consciousness.      PMHx includes:    Cardiac catheterization in 2012 to the proximal circumflex. He was on Plavix " secondarily to this, however, approximately 6-7 years ago he was taken off this medication due to easy bleeding/bruising and episodes of recurrent, uncontrolled epistaxis.     Essential hypertension - currently on Lisinopril 10 mg + Lisinopril-hydrochlorothiazide 20-25 mg, Metoprolol 25 mg    Hyperlipidemia - currently on Lipitor 80 mg nightly    Prediabetes - managed by lifestyle changes    He does report familial history of rheumatoid arthritis in his grandparents and does endorse edema of small joints (hands). No prior evaluation.     No prior known history of giant cell arteritis, amaurosis fugax, shingles, PMR, etc.    Does endorse increased stress lately as his mother unfortunately passed away on 3/1/23.    Review of Systems   Eyes: Positive for pain.      Constitutional, HEENT, cardiovascular, pulmonary, GI, , musculoskeletal, neuro, skin, endocrine and psych systems are negative, except as otherwise noted.      Objective    /84   Pulse 72   Temp 97.5  F (36.4  C)   Resp 20   Wt 115 kg (253 lb 9.6 oz)   SpO2 97%   BMI 35.87 kg/m    Body mass index is 35.87 kg/m .  Physical Exam   GENERAL: healthy, alert and no distress  EYES: Eyes grossly normal to inspection, PERRL and conjunctivae and sclerae normal. Normal accomodation, visual fields and EOMs  HENT: ear canals and TM's normal, nose and mouth without ulcers or lesions. No evidence of hemotympanum  NECK: no adenopathy, no asymmetry, masses, or scars and thyroid normal to palpation  RESP: lungs clear to auscultation - no rales, rhonchi or wheezes  CV: regular rate and rhythm, normal S1 S2, no S3 or S4, no murmur, click or rub, no peripheral edema and peripheral pulses strong  ABDOMEN: soft, nontender, no hepatosplenomegaly, no masses and bowel sounds normal  MS: no gross musculoskeletal defects noted, no edema  SKIN: no suspicious lesions or rashes  NEURO: Awake, alert, oriented to name, place and time.  Cranial nerves II-XII are grossly intact.   Motor shows good stregth, bulk and tone bilaterally. Sensory is intact to fine touch. Romberg negative, and gait is normal.  BACK: no CVA tenderness, no paralumbar tenderness  PSYCH: mentation appears normal, affect normal/bright  LYMPH: normal ant/post cervical, supraclavicular nodes    Results for orders placed or performed in visit on 03/30/23   Sedimentation Rate (ESR)     Status: Normal   Result Value Ref Range    Erythrocyte Sedimentation Rate 10 0 - 20 mm/hr   CRP inflammation     Status: Normal   Result Value Ref Range    CRP Inflammation 4.24 <5.00 mg/L   CBC with platelets and differential     Status: None   Result Value Ref Range    WBC Count 6.1 4.0 - 11.0 10e3/uL    RBC Count 4.91 4.40 - 5.90 10e6/uL    Hemoglobin 16.0 13.3 - 17.7 g/dL    Hematocrit 45.8 40.0 - 53.0 %    MCV 93 78 - 100 fL    MCH 32.6 26.5 - 33.0 pg    MCHC 34.9 31.5 - 36.5 g/dL    RDW 12.3 10.0 - 15.0 %    Platelet Count 188 150 - 450 10e3/uL    % Neutrophils 65 %    % Lymphocytes 30 %    % Monocytes 4 %    % Eosinophils 1 %    % Basophils 0 %    % Immature Granulocytes 0 %    NRBCs per 100 WBC 0 <1 /100    Absolute Neutrophils 4.0 1.6 - 8.3 10e3/uL    Absolute Lymphocytes 1.8 0.8 - 5.3 10e3/uL    Absolute Monocytes 0.2 0.0 - 1.3 10e3/uL    Absolute Eosinophils 0.0 0.0 - 0.7 10e3/uL    Absolute Basophils 0.0 0.0 - 0.2 10e3/uL    Absolute Immature Granulocytes 0.0 <=0.4 10e3/uL    Absolute NRBCs 0.0 10e3/uL   CBC and Differential     Status: None    Narrative    The following orders were created for panel order CBC and Differential.  Procedure                               Abnormality         Status                     ---------                               -----------         ------                     CBC with platelets and d...[[id number]]                      Final result                 Please view results for these tests on the individual orders.

## 2023-03-31 LAB — RHEUMATOID FACT SER NEPH-ACNC: 35 IU/ML

## 2023-04-11 ENCOUNTER — APPOINTMENT (OUTPATIENT)
Dept: CT IMAGING | Facility: OTHER | Age: 68
End: 2023-04-11
Attending: EMERGENCY MEDICINE
Payer: MEDICARE

## 2023-04-11 ENCOUNTER — HOSPITAL ENCOUNTER (EMERGENCY)
Facility: OTHER | Age: 68
Discharge: HOME | End: 2023-04-11
Attending: EMERGENCY MEDICINE | Admitting: EMERGENCY MEDICINE
Payer: MEDICARE

## 2023-04-11 ENCOUNTER — TELEPHONE (OUTPATIENT)
Dept: FAMILY MEDICINE | Facility: OTHER | Age: 68
End: 2023-04-11
Payer: COMMERCIAL

## 2023-04-11 ENCOUNTER — HOSPITAL ENCOUNTER (OUTPATIENT)
Dept: MRI IMAGING | Facility: OTHER | Age: 68
Discharge: HOME | End: 2023-04-11
Attending: PHYSICIAN ASSISTANT
Payer: MEDICARE

## 2023-04-11 VITALS
HEART RATE: 59 BPM | DIASTOLIC BLOOD PRESSURE: 80 MMHG | TEMPERATURE: 98.8 F | SYSTOLIC BLOOD PRESSURE: 132 MMHG | WEIGHT: 250 LBS | BODY MASS INDEX: 35.79 KG/M2 | HEIGHT: 70 IN | RESPIRATION RATE: 16 BRPM | OXYGEN SATURATION: 94 %

## 2023-04-11 DIAGNOSIS — H53.121 TRANSIENT VISUAL LOSS, RIGHT EYE: ICD-10-CM

## 2023-04-11 DIAGNOSIS — H53.9 VISION CHANGES: ICD-10-CM

## 2023-04-11 DIAGNOSIS — I65.21 STENOSIS OF RIGHT CAROTID ARTERY: ICD-10-CM

## 2023-04-11 PROCEDURE — A9575 INJ GADOTERATE MEGLUMI 0.1ML: HCPCS | Performed by: PHYSICIAN ASSISTANT

## 2023-04-11 PROCEDURE — G1010 CDSM STANSON: HCPCS

## 2023-04-11 PROCEDURE — 99291 CRITICAL CARE FIRST HOUR: CPT | Mod: 25 | Performed by: EMERGENCY MEDICINE

## 2023-04-11 PROCEDURE — 250N000011 HC RX IP 250 OP 636: Performed by: EMERGENCY MEDICINE

## 2023-04-11 PROCEDURE — 70544 MR ANGIOGRAPHY HEAD W/O DYE: CPT | Mod: MG

## 2023-04-11 PROCEDURE — 250N000013 HC RX MED GY IP 250 OP 250 PS 637: Performed by: EMERGENCY MEDICINE

## 2023-04-11 PROCEDURE — 255N000002 HC RX 255 OP 636: Performed by: PHYSICIAN ASSISTANT

## 2023-04-11 PROCEDURE — 99283 EMERGENCY DEPT VISIT LOW MDM: CPT | Performed by: EMERGENCY MEDICINE

## 2023-04-11 PROCEDURE — 70496 CT ANGIOGRAPHY HEAD: CPT | Mod: MG

## 2023-04-11 RX ORDER — CLOPIDOGREL BISULFATE 75 MG/1
75 TABLET ORAL DAILY
Qty: 30 TABLET | Refills: 0 | Status: SHIPPED | OUTPATIENT
Start: 2023-04-11 | End: 2023-04-11

## 2023-04-11 RX ORDER — CLOPIDOGREL BISULFATE 75 MG/1
75 TABLET ORAL ONCE
Status: DISCONTINUED | OUTPATIENT
Start: 2023-04-11 | End: 2023-04-11

## 2023-04-11 RX ORDER — IOPAMIDOL 755 MG/ML
75 INJECTION, SOLUTION INTRAVASCULAR ONCE
Status: COMPLETED | OUTPATIENT
Start: 2023-04-11 | End: 2023-04-11

## 2023-04-11 RX ADMIN — GADOTERATE MEGLUMINE 20 ML: 376.9 INJECTION INTRAVENOUS at 14:53

## 2023-04-11 RX ADMIN — IOPAMIDOL 75 ML: 755 INJECTION, SOLUTION INTRAVENOUS at 18:32

## 2023-04-11 RX ADMIN — RIVAROXABAN 20 MG: 10 TABLET, FILM COATED ORAL at 20:14

## 2023-04-11 ASSESSMENT — ENCOUNTER SYMPTOMS
FEVER: 0
CHILLS: 0
VOMITING: 0
LIGHT-HEADEDNESS: 0
SHORTNESS OF BREATH: 0
HEADACHES: 0
SPEECH DIFFICULTY: 0
CHEST TIGHTNESS: 0
FACIAL ASYMMETRY: 0
AGITATION: 0
NAUSEA: 0
DYSURIA: 0
SEIZURES: 0
NUMBNESS: 0
DIZZINESS: 0
TREMORS: 0
WEAKNESS: 0
ARTHRALGIAS: 0

## 2023-04-11 ASSESSMENT — ACTIVITIES OF DAILY LIVING (ADL)
ADLS_ACUITY_SCORE: 33
ADLS_ACUITY_SCORE: 35

## 2023-04-11 NOTE — ED TRIAGE NOTES
Pt arrives with c/o having an MRI today and being told to come back to ER for a head CT due to concerning results. Pt states that radiologist reported that pt has small bleeds present in his MRI.      Triage Assessment     Row Name 04/11/23 [id number]       Triage Assessment (Adult)    Airway WDL WDL       Respiratory WDL    Respiratory WDL WDL       Skin Circulation/Temperature WDL    Skin Circulation/Temperature WDL WDL       Cardiac WDL    Cardiac WDL WDL       Peripheral/Neurovascular WDL    Peripheral Neurovascular WDL WDL       Cognitive/Neuro/Behavioral WDL    Cognitive/Neuro/Behavioral WDL WDL

## 2023-04-11 NOTE — ED PROVIDER NOTES
History     Chief Complaint   Patient presents with     Neurologic Problem     HPI  Kirk John is a 67 year old male who came in for an outpatient MRI today.  He had seen his primary provider who had ordered this.  He states that about 2 weeks ago he had a transient loss of vision in his right eye that only lasted a few minutes and then resolved.  Since that time he says he has not had no further visual changes.  Denies any other neurologic symptoms and has been feeling fine.  MRI however was abnormal and read by radiology as having multiple subacute right anterior circulation infarcts with some abnormal diminished flow related enhancement of the distal cervical petrous and cavernous right internal carotid arteries.  It was recommended that he return for a CTA of the head and neck.  He has not been feeling ill recently with fevers or chills.  Has been eating and drinking normally.  No nausea vomiting, lightheaded dizziness, no numbness tingling or weakness anywhere.  No pain anywhere.    Allergies:  No Known Allergies    Problem List:    Patient Active Problem List    Diagnosis Date Noted     Morbid obesity (H) 12/14/2022     Priority: Medium     CKD (chronic kidney disease) stage 2, GFR 60-89 ml/min 12/14/2022     Priority: Medium     Advance Care Planning 06/15/2018     Priority: Medium     Degeneration of intervertebral disc 02/01/2018     Priority: Medium     Overview:   With back pain       Psychosexual dysfunction with inhibited sexual excitement 02/01/2018     Priority: Medium     Hypercholesterolemia 02/01/2018     Priority: Medium     Prediabetes 02/01/2018     Priority: Medium     Diverticulosis of sigmoid colon 01/25/2016     Priority: Medium     Health care maintenance 01/21/2016     Priority: Medium     Coronary atherosclerosis 09/08/2013     Priority: Medium     Overview:   August 2012 PRAVIN stent placed at [facility] [place]. Moderate diffuse disease in LAD and Circumflex.        Hip pain 07/03/2012      Priority: Medium     Tick bite 2011     Priority: Medium        Past Medical History:    Past Medical History:   Diagnosis Date     Abnormal result of other cardiovascular function study (CODE)      Atherosclerotic heart disease of native coronary artery without angina pectoris      Chest pain      Essential (primary) hypertension      Hyperlipidemia      Osteoarthritis        Past Surgical History:    Past Surgical History:   Procedure Laterality Date     ARTHROPLASTY HIP      2007,Left total hip with Dr Graham Escalante     COLONOSCOPY      6/3/05,Colonoscopy, normal.  F/u in 10 years     COLONOSCOPY  2016    Normal exam,  F/U      CV CARDIAC CATHERIZATION  2012    proximal circumflex, [facility]     RELEASE CARPAL TUNNEL      ,Right carpal tunnel sugery     TONSILLECTOMY, ADENOIDECTOMY, COMBINED      T&A as a child       Family History:    Family History   Problem Relation Age of Onset     Cancer Father         Cancer,Lung     Peripheral Vascular Disease Father      Heart Disease Mother 70        Heart Disease,CAD/MI     Pacemaker Sister      Heart Disease Maternal Grandfather 60        Heart Disease,CAD     Heart Disease Maternal Uncle 60        Heart Disease,CAD     No Known Problems Sister      Diabetes No family hx of      Prostate Cancer No family hx of      Colon Cancer No family hx of        Social History:  Marital Status:   [2]  Social History     Tobacco Use     Smoking status: Former     Types: Cigarettes     Quit date: 10/11/1984     Years since quittin.5     Passive exposure: Never     Smokeless tobacco: Never     Tobacco comments:     Last smoked over 40 years ago-early 80's   Vaping Use     Vaping status: Never Used   Substance Use Topics     Alcohol use: Yes     Comment: 2 a week     Drug use: Never        Medications:    rivaroxaban ANTICOAGULANT (XARELTO) 20 MG TABS tablet  aspirin EC 81 MG EC tablet  atorvastatin (LIPITOR) 80 MG tablet  blood glucose (NO BRAND  "SPECIFIED) lancets standard  blood glucose (NO BRAND SPECIFIED) test strip  blood glucose monitoring (ACCU-CHEK ENEDINA PLUS) meter device kit  blood glucose monitoring (ACCU-CHEK MULTICLIX) lancets  blood glucose monitoring (NO BRAND SPECIFIED) meter device kit  lisinopril (ZESTRIL) 10 MG tablet  lisinopril-hydrochlorothiazide (ZESTORETIC) 20-25 MG tablet  metoprolol succinate ER (TOPROL XL) 25 MG 24 hr tablet  Multiple Vitamins-Minerals (MULTIVITAMIN & MINERAL PO)  nitroGLYcerin (NITROSTAT) 0.4 MG sublingual tablet  Omega-3 Fatty Acids (FISH OIL PO)  sildenafil (VIAGRA) 50 MG tablet          Review of Systems   Constitutional: Negative for chills and fever.   HENT: Negative for congestion.    Eyes: Positive for visual disturbance.   Respiratory: Negative for chest tightness and shortness of breath.    Cardiovascular: Negative for chest pain.   Gastrointestinal: Negative for nausea and vomiting.   Genitourinary: Negative for dysuria.   Musculoskeletal: Negative for arthralgias.   Skin: Negative for rash.   Neurological: Negative for dizziness, tremors, seizures, syncope, facial asymmetry, speech difficulty, weakness, light-headedness, numbness and headaches.   Psychiatric/Behavioral: Negative for agitation.       Physical Exam   BP: (!) 157/69  Pulse: 66  Temp: 98.8  F (37.1  C)  Resp: 16  Height: 177.8 cm (5' 10\")  Weight: 113.4 kg (250 lb)  SpO2: 93 %      Physical Exam  Vitals and nursing note reviewed.   Constitutional:       Appearance: Normal appearance.   HENT:      Head: Normocephalic and atraumatic.      Mouth/Throat:      Mouth: Mucous membranes are moist.   Eyes:      Extraocular Movements: Extraocular movements intact.      Conjunctiva/sclera: Conjunctivae normal.      Comments: No visual field defects.   Cardiovascular:      Rate and Rhythm: Normal rate and regular rhythm.      Heart sounds: Normal heart sounds.   Pulmonary:      Effort: Pulmonary effort is normal.      Breath sounds: Normal breath " sounds.   Abdominal:      General: Abdomen is flat. Bowel sounds are normal.   Skin:     General: Skin is warm and dry.   Neurological:      Mental Status: He is alert and oriented to person, place, and time.      GCS: GCS eye subscore is 4. GCS verbal subscore is 5. GCS motor subscore is 6.      Cranial Nerves: Cranial nerves 2-12 are intact.      Motor: No weakness, abnormal muscle tone or pronator drift.      Coordination: Romberg sign negative. Finger-Nose-Finger Test normal.   Psychiatric:         Behavior: Behavior normal.         ED Course                 Procedures                Results for orders placed or performed during the hospital encounter of 04/11/23 (from the past 24 hour(s))   CTA Head Neck with Contrast    Narrative    CTA HEAD NECK W CONTRAST    HISTORY: 67 years Male stroke on mri brain    COMPARISON: None    TECHNIQUE: Contrast-enhanced CT angiography of the neck and head was  performed with intervenous contrast. Multiplanar reconstructions and  MIP, volume rendered and 3-D MIP reconstructions were obtained on a  3-D workstation.  This exam was performed using one or more of the following dose  reduction techniques:  Automated exposure control, adjustment of the CRAIG and/or KV according  to patient's size, and/or use of iterative reconstruction technique.    FINDINGS:  There is encephalomalacia within the right frontal lobe bordering the  sylvian fissure. Suspect this is difficult on late subacute or older  infarct. There is no evidence of intracranial hemorrhage.   Right neck:            Brachiocephalic artery: patent            Common carotid artery:  patent            Internal carotid artery: There is dense atherosclerotic  plaque at the carotid bulb. There is severe stenosis of the internal  carotid artery at the level of the carotid bulb. Residual luminal  diameter is not discernible through this area. MRA findings from  today's study support presence of a critical stenosis. The lumen  of  the vessel enhances above this stenosis but size the vessel is smaller  compared to the left. There is extensive atherosclerotic disease of  the intracranial portion of the ICA.            Vertebral artery:Widely patent                Left neck:            Common carotid artery: Widely patent            Internal carotid artery: There is calcified atherosclerotic  plaque at the level of the bulb. There is mild stenosis, 50% or less.            Vertebral artery:Widely patent      Head:                      Anterior circulation: There is asymmetrically reduced density  of arterial enhancement through the right middle cerebral artery  distribution. Anterior cerebral vessels enhance normally. There is a  patent anterior communicator.           Posterior circulation: A right posterior cerebral artery  appears to originate from the anterior circulation. A small posterior  communicator is likely present. There is no abrupt vessel cut off  otherwise.      Impression    IMPRESSION: Findings compatible with a critical or flow limiting  stenosis of the right internal carotid artery at the level of the  carotid bulb.    Evidence of subacute or older infarct in the right MCA distribution.    50% or less stenosis of the left internal carotid at the level of the  carotid bulb.    FEI JACOBSON MD         SYSTEM ID:  [id number]       Medications   rivaroxaban ANTICOAGULANT (XARELTO) tablet 20 mg (has no administration in time range)   iopamidol (ISOVUE-370) solution 75 mL (75 mLs Intravenous $Given 4/11/23 [id number])       Assessments & Plan (with Medical Decision Making)     I have reviewed the nursing notes.    I have reviewed the findings, diagnosis, plan and need for follow up with the patient.  Patient is symptom-free with a completely nonfocal neuro exam at this time.  I do not see any visual field deficits on my bedside exam.  CTA with results as above.  I called [facility] and spoke with Dr. Spear from neurosurgery  and discussed results with him.  He suggested starting the patient on Xarelto, and he is given 20 mg here now and will be given prescription for the same.  He should continue his aspirin 81 mg daily.  Also should continue his Lipitor 80 mg daily.  Dr. Spear felt outpatient follow-up was appropriate as long as the patient is not having any new symptoms.  They will contact him to arrange this.  In the meantime if he does have any new symptoms, which were discussed with him, he should return to the ER right away.      New Prescriptions    RIVAROXABAN ANTICOAGULANT (XARELTO) 20 MG TABS TABLET    Take 1 tablet (20 mg) by mouth daily (with dinner)       Final diagnoses:   Stenosis of right carotid artery       4/11/2023   [facility]ama[facility] MD  04/11/23 1958

## 2023-04-11 NOTE — TELEPHONE ENCOUNTER
Called with critical results from radiology. Patient will be going into the ER for CTA of head and neck and urgent consultation.     Erica Hackett PA-C  4/11/2023  4:24 PM

## 2023-04-12 ENCOUNTER — TELEPHONE (OUTPATIENT)
Dept: FAMILY MEDICINE | Facility: OTHER | Age: 68
End: 2023-04-12
Payer: COMMERCIAL

## 2023-04-12 DIAGNOSIS — Z86.73 HISTORY OF MULTIPLE STROKES: ICD-10-CM

## 2023-04-12 DIAGNOSIS — I63.89 ACUTE ARTERIAL ISCHEMIC STROKE, MULTIFOCAL, MULTIPLE VASCULAR TERRITORIES (H): Primary | ICD-10-CM

## 2023-04-12 NOTE — DISCHARGE INSTRUCTIONS
Take the Xarelto 20 mg once daily as well as aspirin 81 mg daily.  Continue your Lipitor 80 mg daily as well.  You should get a phone call shortly from [facility] to schedule follow-up with neurosurgery.  In the meantime return if any new symptoms

## 2023-04-12 NOTE — TELEPHONE ENCOUNTER
He definitely needs to see neurology, but I would get an EKG, heart monitor and echocardiogram as part of a stroke workup just to help make decisions on the blood thinner when he sees neurology. I can see him Friday same day slots unless neurology is going to see him this week.  Ordered cardiac monitor to be placed Friday if possible while he is at appointment

## 2023-04-12 NOTE — TELEPHONE ENCOUNTER
Patient was informed during the ER visit that he will get a call within a day or so to follow up with Neurology.    Patient was wondering if he needs to follow up with Calin Rubin MD, neurology or both?    Fariba Sloan LPN 4/12/2023 9:28 AM

## 2023-04-13 ENCOUNTER — MYC MEDICAL ADVICE (OUTPATIENT)
Dept: FAMILY MEDICINE | Facility: OTHER | Age: 68
End: 2023-04-13
Payer: COMMERCIAL

## 2023-04-14 ENCOUNTER — OFFICE VISIT (OUTPATIENT)
Dept: FAMILY MEDICINE | Facility: OTHER | Age: 68
End: 2023-04-14
Attending: FAMILY MEDICINE
Payer: COMMERCIAL

## 2023-04-14 VITALS
WEIGHT: 250 LBS | TEMPERATURE: 97.9 F | BODY MASS INDEX: 35.87 KG/M2 | DIASTOLIC BLOOD PRESSURE: 78 MMHG | OXYGEN SATURATION: 96 % | HEART RATE: 56 BPM | RESPIRATION RATE: 20 BRPM | SYSTOLIC BLOOD PRESSURE: 118 MMHG

## 2023-04-14 DIAGNOSIS — G45.3 AMAUROSIS FUGAX OF RIGHT EYE: ICD-10-CM

## 2023-04-14 DIAGNOSIS — I63.231 STENOSIS OF RIGHT INTERNAL CAROTID ARTERY WITH CEREBRAL INFARCTION (H): Primary | ICD-10-CM

## 2023-04-14 DIAGNOSIS — I63.89 ACUTE ARTERIAL ISCHEMIC STROKE, MULTIFOCAL, MULTIPLE VASCULAR TERRITORIES (H): ICD-10-CM

## 2023-04-14 DIAGNOSIS — I10 ESSENTIAL HYPERTENSION: ICD-10-CM

## 2023-04-14 DIAGNOSIS — I25.10 ATHEROSCLEROSIS OF NATIVE CORONARY ARTERY OF NATIVE HEART WITHOUT ANGINA PECTORIS: ICD-10-CM

## 2023-04-14 PROCEDURE — 93010 ELECTROCARDIOGRAM REPORT: CPT | Performed by: INTERNAL MEDICINE

## 2023-04-14 PROCEDURE — 93005 ELECTROCARDIOGRAM TRACING: CPT | Performed by: FAMILY MEDICINE

## 2023-04-14 PROCEDURE — 99215 OFFICE O/P EST HI 40 MIN: CPT | Performed by: FAMILY MEDICINE

## 2023-04-14 PROCEDURE — G0463 HOSPITAL OUTPT CLINIC VISIT: HCPCS

## 2023-04-14 PROCEDURE — G0463 HOSPITAL OUTPT CLINIC VISIT: HCPCS | Mod: 25

## 2023-04-14 ASSESSMENT — PAIN SCALES - GENERAL: PAINLEVEL: NO PAIN (0)

## 2023-04-14 NOTE — NURSING NOTE
"Patient presents to the clinic for ER follow up.    FOOD SECURITY SCREENING QUESTIONS:    The next two questions are to help us understand your food security.  If you are feeling you need any assistance in this area, we have resources available to support you today.    Hunger Vital Signs:  Within the past 12 months we worried whether our food would run out before we got money to buy more. Never  Within the past 12 months the food we bought just didn't last and we didn't have money to get more. Never    Advance Care Directive on file? yes  Advance Care Directive provided to patient? Yes.    Chief Complaint   Patient presents with     Clinic Care Coordination - Follow-up     ER       Initial /78 (BP Location: Left arm, Patient Position: Sitting, Cuff Size: Adult Large)   Pulse 56   Temp 97.9  F (36.6  C) (Tympanic)   Resp 20   Wt 113.4 kg (250 lb)   SpO2 96%   BMI 35.87 kg/m   Estimated body mass index is 35.87 kg/m  as calculated from the following:    Height as of 4/11/23: 1.778 m (5' 10\").    Weight as of this encounter: 113.4 kg (250 lb).  Medication Reconciliation: complete        Fariba Sloan LPN       "

## 2023-04-14 NOTE — PATIENT INSTRUCTIONS
I discussed your case with [place] neurosurgery department  They have all the records, but were waiting to review images as of 5 pm Friday  They are going to contact you to schedule an appointment once they have the images.  If you do not hear something by early next week call [phone] for the department of neurosurgery  Your [facility] patient number is [phone]

## 2023-04-14 NOTE — PROGRESS NOTES
Assessment & Plan       ICD-10-CM    1. Stenosis of right internal carotid artery with cerebral infarction (H)  I63.231 EKG 12-lead, tracing only      2. Amaurosis fugax of right eye  G45.3       3. Acute arterial ischemic stroke, multifocal, multiple vascular territories (H)  I63.89       4. Atherosclerosis of native coronary artery of native heart without angina pectoris  I25.10 EKG 12-lead, tracing only        History of ASCVD on aspirin and statin.  Prediabetic. Controlled hypertension  Lost vision in the right eye 3/2823  Sent a check24 message about this 3/29.  Was seen in clinic 3/30 by MARICEL Quan and imaging ordered  Brain imaging on 4/11 showed multiple right anterior circulation infarcts.  There was concern for right ICA stenosis.  He was sent to the ED and had a CTA showing critical or flow-limiting stenosis in the right ICA  Stroke neurology at [facility] recommended starting anticoagulation.  He was placed on Xarelto. Tolerating medication.  No residual symptoms    No EKG completed previously. EKG today shows sinus bradycardia with first-degree AV block.  A complete stroke work-up would entail echocardiogram and potentially carotid ultrasound to further assess the stenosis.    However, he has signs of multiple right-sided infarcts and critical right internal carotid stenosis.  Likely qualifies for surgery.  He would like to be seen at [facility].  I called [facility] to expedite a referral.  This went to the department of neurosurgery.  They are able to receive his records through care everywhere.  We will push imaging via PACS.  They will review and contact him to schedule.    Patient left before all of the coordination was completed.  He will continue with current medications and await appointment with [facility].      47 minutes spent by me on the date of the encounter doing chart review, review of test results, patient visit, documentation and discussion with other provider(s)     Calin Rubin,  MD  [facility]    Ivelisse Bradley is a 67 year old, presenting for the following health issues:  Clinic Care Coordination - Follow-up (ER)         View : No data to display.              History of Present Illness       Reason for visit:  Follow up to MRI, MRA an CT scans of the brain and neck re temporary loss of vision on March 28.    He eats 2-3 servings of fruits and vegetables daily.He consumes 0 sweetened beverage(s) daily.He exercises with enough effort to increase his heart rate 20 to 29 minutes per day.  He exercises with enough effort to increase his heart rate 3 or less days per week.   He is taking medications regularly.       ED/UC Followup:    Facility:  [facility] ER  Date of visit: 4-11-23  Reason for visit: Stenosis of Right Carotid Artery  Current Status: stable    See above    Review of Systems   As above      Objective    /78 (BP Location: Left arm, Patient Position: Sitting, Cuff Size: Adult Large)   Pulse 56   Temp 97.9  F (36.6  C) (Tympanic)   Resp 20   Wt 113.4 kg (250 lb)   SpO2 96%   BMI 35.87 kg/m    Body mass index is 35.87 kg/m .  Physical Exam   General Appearance: Alert. No acute distress  Eyes: EOMI, PERRL, no nystagmus  Ears: Normal TM bilaterally  Chest/Respiratory Exam: Clear to auscultation bilaterally  Cardiovascular Exam: Regular rate and rhythm. S1, S2, no murmur, gallop, or rubs.  Neuro Exam: Alert, oriented x 3. CN II-XI intact. Sensation to light touch intact; reflexes 2+, strength symmetric upper and lower extremities. No dysmetria. No pronator drift  Extremities: 2+ pedal pulses.  No lower extremity edema.  Psychiatric: Normal affect and mentation    Results for orders placed or performed in visit on 04/14/23   EKG 12-lead, tracing only     Status: None (Preliminary result)   Result Value Ref Range    Systolic Blood Pressure  mmHg    Diastolic Blood Pressure  mmHg    Ventricular Rate 50 BPM    Atrial Rate 50 BPM    VA Interval 238 ms    QRS  Duration 108 ms     ms    QTc 395 ms    P Axis 62 degrees    R AXIS -4 degrees    T Axis -5 degrees    Interpretation ECG       Sinus bradycardia with 1st degree A-V block  Otherwise normal ECG  No previous ECGs available

## 2023-04-17 ENCOUNTER — TELEPHONE (OUTPATIENT)
Dept: FAMILY MEDICINE | Facility: OTHER | Age: 68
End: 2023-04-17
Payer: COMMERCIAL

## 2023-04-17 NOTE — TELEPHONE ENCOUNTER
Patient is scheduled to see Neurology team on Monday 4-24-23, Dr. Bartlett and Tuesday 4-25-23, Dr. Bahena.  [facility] called patient to see if patient wanted to schedule for heart monitor placement in May.      Patient is wondering if he still needs to have the cardiac monitor since it is happening after the [place] Visits.      Fariba Sloan LPN 4/17/2023 4:45 PM

## 2023-04-17 NOTE — TELEPHONE ENCOUNTER
Patient called asking to speak to nurse or doctor regarding blake the heart monitor goes with the neurosurgery if they even do.

## 2023-04-19 LAB
ATRIAL RATE - MUSE: 50 BPM
DIASTOLIC BLOOD PRESSURE - MUSE: NORMAL MMHG
INTERPRETATION ECG - MUSE: NORMAL
P AXIS - MUSE: 62 DEGREES
PR INTERVAL - MUSE: 238 MS
QRS DURATION - MUSE: 108 MS
QT - MUSE: 434 MS
QTC - MUSE: 395 MS
R AXIS - MUSE: -4 DEGREES
SYSTOLIC BLOOD PRESSURE - MUSE: NORMAL MMHG
T AXIS - MUSE: -5 DEGREES
VENTRICULAR RATE- MUSE: 50 BPM

## 2023-05-08 ENCOUNTER — OFFICE VISIT (OUTPATIENT)
Dept: INTERNAL MEDICINE | Facility: OTHER | Age: 68
End: 2023-05-08
Payer: COMMERCIAL

## 2023-05-08 VITALS
TEMPERATURE: 97.8 F | OXYGEN SATURATION: 96 % | HEART RATE: 60 BPM | WEIGHT: 255.13 LBS | BODY MASS INDEX: 36.61 KG/M2 | SYSTOLIC BLOOD PRESSURE: 130 MMHG | RESPIRATION RATE: 20 BRPM | DIASTOLIC BLOOD PRESSURE: 78 MMHG

## 2023-05-08 DIAGNOSIS — Z95.828 INTERNAL CAROTID ARTERY STENT PRESENT: ICD-10-CM

## 2023-05-08 DIAGNOSIS — G45.9 TIA (TRANSIENT ISCHEMIC ATTACK): ICD-10-CM

## 2023-05-08 DIAGNOSIS — Z79.01 CHRONIC ANTICOAGULATION: ICD-10-CM

## 2023-05-08 DIAGNOSIS — E78.00 HYPERCHOLESTEROLEMIA: ICD-10-CM

## 2023-05-08 DIAGNOSIS — I10 HYPERTENSION, UNSPECIFIED TYPE: ICD-10-CM

## 2023-05-08 DIAGNOSIS — R73.03 PREDIABETES: ICD-10-CM

## 2023-05-08 DIAGNOSIS — Z86.69 H/O AMAUROSIS FUGAX: ICD-10-CM

## 2023-05-08 DIAGNOSIS — Z09 HOSPITAL DISCHARGE FOLLOW-UP: Primary | ICD-10-CM

## 2023-05-08 PROBLEM — Z98.890 S/P CAROTID ENDARTERECTOMY: Status: ACTIVE | Noted: 2023-05-08

## 2023-05-08 PROCEDURE — G0463 HOSPITAL OUTPT CLINIC VISIT: HCPCS

## 2023-05-08 PROCEDURE — 99214 OFFICE O/P EST MOD 30 MIN: CPT

## 2023-05-08 PROCEDURE — 99496 TRANSJ CARE MGMT HIGH F2F 7D: CPT

## 2023-05-08 RX ORDER — ASPIRIN 81 MG/1
81 TABLET, CHEWABLE ORAL
COMMUNITY
Start: 2023-05-03 | End: 2023-07-31

## 2023-05-08 RX ORDER — CLOPIDOGREL BISULFATE 75 MG/1
75 TABLET ORAL
COMMUNITY
Start: 2023-05-03 | End: 2023-12-14

## 2023-05-08 RX ORDER — LISINOPRIL 10 MG/1
10 TABLET ORAL DAILY
Qty: 90 TABLET | Refills: 0 | Status: SHIPPED | OUTPATIENT
Start: 2023-05-08 | End: 2023-10-04

## 2023-05-08 ASSESSMENT — ENCOUNTER SYMPTOMS
SEIZURES: 0
PARESTHESIAS: 0
LIGHT-HEADEDNESS: 0
SPEECH DIFFICULTY: 0
TREMORS: 0
NUMBNESS: 0
WEAKNESS: 0
CHEST TIGHTNESS: 0
COUGH: 0
CHILLS: 0
FACIAL ASYMMETRY: 0
FEVER: 0
PALPITATIONS: 0
SHORTNESS OF BREATH: 0
HEADACHES: 0
DIZZINESS: 0

## 2023-05-08 ASSESSMENT — PAIN SCALES - GENERAL: PAINLEVEL: NO PAIN (0)

## 2023-05-08 NOTE — PATIENT INSTRUCTIONS
Restart 10 mg of lisinopril- continue checking blood pressures for goal of <140/90. Please follow up if blood pressures remain elevated

## 2023-05-08 NOTE — PROGRESS NOTES
Assessment & Plan   Kirk John is a 67 year old presenting for the following health issues:      ICD-10-CM    1. Hospital discharge follow-up  Z09       2. TIA (transient ischemic attack)  G45.9 Adult Cardiology al [facility] Referral      3. Internal carotid artery stent present  Z95.828       4. Hypertension, unspecified type  I10 lisinopril (ZESTRIL) 10 MG tablet      5. Hypercholesterolemia  E78.00       6. Prediabetes  R73.03       7. Chronic anticoagulation  Z79.01       8. H/O amaurosis fugax  Z86.69         Patient's follow up cardiology and neurology appointments are in the process of being arranged at [facility]. Due to blood pressures starting to increase- restarted his lisinopril 10 mg daily- Will have cardiology evaluate need to increase his blood pressure medications if further needed.     Reviewed emphasis on lifestyle modifications to reduce A1C, lipids, and blood pressures to prevent future cardiovascular events.     Patient is otherwise stable and doing well.     No follow-ups on file.    TESSIE Gilbert CNP  [facility]      Subjective   Kirk is a 67 year old, presenting for the following health issues:    Patient presents to clinic for hospital follow up from [facility] after enduring TIA consisting of amaurosis fugax, slurred speech, left hemiparesis, and left facial droop lasting 20-30 minutes on 4/29/23 with complete resolution. CT head was unremarkable for acute or new intracranial findings while CTA demonstrated high-grade stenosis of right ICA. Patient was transferred to neuro ICU and underwent stent placement of ICA. He was increased to 325 mg of aspirin and started on Plavix. He had a cardiac CTA which demonstrated known CAD, no intracardiac thrombus, TTE showed small PFO; EF 57%.    He was continued on 80 mg of atorvastatin- instructed to hold metoprolol for bradycardia and lisinopril.    Patients states his blood pressures at home have been elevated  "140's-150's systolic. Otherwise, asymptomatic and feeling well.       Hospital F/U (Follow up from stroke   Jennifer Royal LPN on 5/8/2023 at 11:24 AM//)        4/14/2023     2:07 PM   Additional Questions   Roomed by eli gan lpn   Accompanied by spouse-Mabel     History of Present Illness       Reason for visit:  Followup on recent TIAs and carotid artery angioplasty with stent as requested by [facility].    He eats 2-3 servings of fruits and vegetables daily.He consumes 0 sweetened beverage(s) daily.He exercises with enough effort to increase his heart rate 20 to 29 minutes per day.  He exercises with enough effort to increase his heart rate 3 or less days per week.   He is taking medications regularly.     Blood pressures 154 systolic     Hospital Follow-up Visit:    Hospital/Nursing Home/IP Rehab Facility: [facility]  Date of Admission: 4-29-23  Date of Discharge: 5-02-23  Reason(s) for Admission: Stroke    Was your hospitalization related to COVID-19? No   Problems taking medications regularly:  None  Medication changes since discharge: None  Problems adhering to non-medication therapy:  None  Chief Complaint   Patient presents with     Hospital F/U     Follow up from stroke   Jennifer Royal LPN on 5/8/2023 at 11:24 AM           Initial /78 (BP Location: Right arm, Patient Position: Sitting, Cuff Size: Adult Large)   Pulse 60   Temp 97.8  F (36.6  C)   Resp 20   Wt 115.7 kg (255 lb 2 oz)   SpO2 96%   BMI 36.61 kg/m   Estimated body mass index is 36.61 kg/m  as calculated from the following:    Height as of 4/11/23: 1.778 m (5' 10\").    Weight as of this encounter: 115.7 kg (255 lb 2 oz).  Medication Reconciliation: complete    FOOD SECURITY SCREENING QUESTIONS  Hunger Vital Signs:  Within the past 12 months we worried whether our food would run out before we got money to buy more. Never  Within the past 12 months the food we bought just didn't last and we didn't have money to get " more. Never  Jennifer Royal, LPN 5/8/2023 11:34 AM       Advance care directive on file? yes  Advance care directive provided to patient?        Christie Brown, TESSIE CNP    Summary of hospitalization:  [facility] hospital discharge summary reviewed  Diagnostic Tests/Treatments reviewed.  Follow up needed: none  Other Healthcare Providers Involved in Patient s Care:         Specialist appointment - Cardiology and Neurology  Update since discharge: stable.         Plan of care communicated with patient and family                   Review of Systems   Constitutional: Negative for chills and fever.   Eyes: Positive for visual disturbance ( right eye- changes since 2016).   Respiratory: Negative for cough, chest tightness and shortness of breath.    Cardiovascular: Negative for chest pain, palpitations and peripheral edema.   Neurological: Negative for dizziness, tremors, seizures, facial asymmetry, speech difficulty, weakness, light-headedness, numbness, headaches and paresthesias.   All other systems reviewed and are negative.           Objective    /78 (BP Location: Right arm, Patient Position: Sitting, Cuff Size: Adult Large)   Pulse 60   Temp 97.8  F (36.6  C)   Resp 20   Wt 115.7 kg (255 lb 2 oz)   SpO2 96%   BMI 36.61 kg/m    Body mass index is 36.61 kg/m .  Physical Exam  Vitals reviewed.   Constitutional:       General: He is not in acute distress.     Appearance: Normal appearance. He is well-developed.   HENT:      Head: Normocephalic and atraumatic.      Nose: Nose normal.      Mouth/Throat:      Pharynx: No oropharyngeal exudate.   Eyes:      General: No scleral icterus.     Conjunctiva/sclera: Conjunctivae normal.      Pupils: Pupils are equal, round, and reactive to light.   Neck:      Thyroid: No thyromegaly.   Cardiovascular:      Rate and Rhythm: Normal rate and regular rhythm.      Heart sounds: No murmur heard.  Pulmonary:      Effort: Pulmonary effort is normal. No respiratory  distress.      Breath sounds: Normal breath sounds. No wheezing.   Musculoskeletal:         General: No tenderness or deformity. Normal range of motion.      Cervical back: Normal range of motion and neck supple.   Skin:     General: Skin is warm and dry.      Findings: No rash.   Neurological:      General: No focal deficit present.      Mental Status: He is alert and oriented to person, place, and time. Mental status is at baseline.      GCS: GCS eye subscore is 4. GCS verbal subscore is 5. GCS motor subscore is 6.      Cranial Nerves: No cranial nerve deficit or facial asymmetry.      Sensory: Sensation is intact. No sensory deficit.      Motor: No weakness.      Coordination: Coordination is intact. Coordination normal.      Gait: Gait normal.   Psychiatric:         Behavior: Behavior normal.         Thought Content: Thought content normal.         Judgment: Judgment normal.

## 2023-05-16 ENCOUNTER — DOCUMENTATION ONLY (OUTPATIENT)
Dept: OTHER | Facility: CLINIC | Age: 68
End: 2023-05-16
Payer: COMMERCIAL

## 2023-05-16 PROBLEM — Z71.89 ADVANCED DIRECTIVES, COUNSELING/DISCUSSION: Chronic | Status: RESOLVED | Noted: 2018-06-15 | Resolved: 2023-05-16

## 2023-07-28 ENCOUNTER — OFFICE VISIT (OUTPATIENT)
Dept: FAMILY MEDICINE | Facility: OTHER | Age: 68
End: 2023-07-28
Attending: PHYSICIAN ASSISTANT
Payer: COMMERCIAL

## 2023-07-28 VITALS
TEMPERATURE: 97.7 F | RESPIRATION RATE: 18 BRPM | DIASTOLIC BLOOD PRESSURE: 86 MMHG | WEIGHT: 247.4 LBS | HEART RATE: 55 BPM | SYSTOLIC BLOOD PRESSURE: 142 MMHG | OXYGEN SATURATION: 97 % | HEIGHT: 72 IN | BODY MASS INDEX: 33.51 KG/M2

## 2023-07-28 DIAGNOSIS — M25.512 CHRONIC LEFT SHOULDER PAIN: ICD-10-CM

## 2023-07-28 DIAGNOSIS — G89.29 CHRONIC LEFT SHOULDER PAIN: ICD-10-CM

## 2023-07-28 DIAGNOSIS — G47.30 SLEEP APNEA, UNSPECIFIED TYPE: Primary | ICD-10-CM

## 2023-07-28 DIAGNOSIS — Z00.00 ENCOUNTER FOR MEDICAL EXAMINATION TO ESTABLISH CARE: ICD-10-CM

## 2023-07-28 PROCEDURE — 99214 OFFICE O/P EST MOD 30 MIN: CPT | Performed by: PHYSICIAN ASSISTANT

## 2023-07-28 PROCEDURE — G0463 HOSPITAL OUTPT CLINIC VISIT: HCPCS

## 2023-07-28 RX ORDER — ROSUVASTATIN CALCIUM 40 MG/1
40 TABLET, COATED ORAL DAILY
COMMUNITY
Start: 2023-06-15 | End: 2024-06-13

## 2023-07-28 ASSESSMENT — PAIN SCALES - GENERAL: PAINLEVEL: NO PAIN (0)

## 2023-07-28 NOTE — PROGRESS NOTES
"  Assessment & Plan     1. Sleep apnea, unspecified type  - Adult Sleep Eval & Management Referral; Future  -I recommend referral to sleep medicine, Dr. Valdivia for consideration of a CPAP.  As noted in my referral in the note below patient did have an overnight oximetry study which was read by one of the providers at [facility], consistent with sleep apnea.  He will be contacted to schedule this referral.    2. Chronic left shoulder pain  - MR Shoulder Left w/o Contrast; Future  -Ongoing secondary to a fall earlier this year on ice.  He has decreased strength and range of motion as well as positive impingement testing.  I recommend we proceed with an MRI for ongoing evaluation.  We could consider a injection, physical therapy or orthopedic referral depending on the results of his MRI.  He will be contacted to schedule the MRI and I will follow-up on recommendations once this is available for review.  Continue with heat, ice, gentle stretching and Tylenol as needed for discomfort in the meantime. Patient is in agreement and understanding of the above treatment plan. All questions and concerns were addressed and answered to patient's satisfaction. AVS reviewed with patient.     3. Encounter for medical examination to establish care  - Updated in chart today     BMI:   Estimated body mass index is 33.79 kg/m  as calculated from the following:    Height as of this encounter: 1.822 m (5' 11.75\").    Weight as of this encounter: 112.2 kg (247 lb 6.4 oz).   Weight management plan: Discussed healthy diet and exercise guidelines    See Patient Instructions    Return for Referral placed, they will contact you to schedule, MRI ordered, you will be contacted to schedule.    30 minutes spent on patient care, 95% of this was spent face-to-face with patient, additional 5% (5 minutes) was spent on documentation.    Erica Hackett PA-C  [facility]    Ivelisse Bradley is a 67 year old, presenting for the " following health issues:  Consult (Establish care)      7/28/2023     8:31 AM   Additional Questions   Roomed by Angie CORTEZ   Accompanied by Self     History of Present Illness       Vascular Disease:  He presents for follow up of vascular disease.      He takes daily aspirin.    He eats 4 or more servings of fruits and vegetables daily.He consumes 0 sweetened beverage(s) daily.He exercises with enough effort to increase his heart rate 10 to 19 minutes per day.  He exercises with enough effort to increase his heart rate 4 days per week.   He is taking medications regularly.    Kirk presents to today to establish care (his primary care provider is transitioning to a different well within the organization):  Possible sleep apnea, he had a recent stay at [facility] for cardiopulmonary pathology as well as secondary to a stroke.  During this time they performed an overnight pulse oximetry study which was consistent with sleep apnea.  They placed him on a CPAP during his hospitalization, but he has not had any follow-up in regards to this or heard whether he should be on a CPAP chronically.  He would like to further address this.  Occasional snoring and daytime somnolence, otherwise asymptomatic.  Follow-up from [facility] visits, he will be having a CABG x3 on 9/19/2023 with cardiology at [facility].  He recently had an angiogram.  He is very happy with the care that he has received thus far.  He had an office visit last week with neurology and had a carotid ultrasound to check on the status of his stent, this is stable and he will follow-up in 1 year.  He is currently on Plavix 75 mg once daily and a baby aspirin daily.  They will be further addressing this at their next office visit.  As noted in my note from March 2023 he fell on the ice earlier this year and has left-sided shoulder pain.  He initially thought this might be a sprain or strain but the pain has progressed and persisted.  He has decreased motion and  "strength.  He particularly finds opening the mailbox and laying in bed to be difficult.  He is curious if he may have a rotator cuff tear or other pathology.  He declines any new falls, injuries and/or trauma.  He declines any radicular pain.  He is using over-the-counter analgesics, gentle stretching, heat and ice as needed.    Review of Systems   Constitutional, HEENT, cardiovascular, pulmonary, GI, , musculoskeletal, neuro, skin, endocrine and psych systems are negative, except as otherwise noted.        Objective    BP (!) 142/86 (BP Location: Right arm, Patient Position: Sitting, Cuff Size: Adult Regular)   Pulse 55   Temp 97.7  F (36.5  C) (Temporal)   Resp 18   Ht 1.822 m (5' 11.75\")   Wt 112.2 kg (247 lb 6.4 oz)   SpO2 97%   BMI 33.79 kg/m    Body mass index is 33.79 kg/m .  Physical Exam   GENERAL: healthy, alert and no distress  RESP: lungs clear to auscultation - no rales, rhonchi or wheezes  CV: regular rate and rhythm, normal S1 S2, no S3 or S4, no murmur, click or rub, no peripheral edema and peripheral pulses strong  MS:   LEFT SHOULDER PHYSICAL EXAMINATION:  Gross Examination: shoulders appear symmetrical in appearance, no signs of bony deformity over the AC, clavicle or glenohumeral joints. No signs of previous or current trauma. No signs of ecchymosis. Skin is intact.     Palpation: Tenderness to palpation: AC joint, acromion, and proximal biceps tendon    ROM: flexion 160*, extension 45*, abduction 170*, adduction 35*, IR, ER - level of T12 on left and T6 on right    Special Testing:   Shoulder Strength:    Speed/Yergason (bicep): negative      Instability:    Apprehension: negative    Cross Over: negative     Impingement:    Neer: positive    Barton: positive    Empty Can (supraspinatus): positive    Neurovascular status: distal pulses are intact and are 2+. Two point discrimination intact. Distal capillary refill intact < 3 seconds.     NEURO: Normal strength and tone, mentation intact " and speech normal  PSYCH: mentation appears normal, affect normal/bright

## 2023-07-28 NOTE — NURSING NOTE
"Patient presents to the clinic for establish care, review meds, discuss left rotator cuff    FOOD SECURITY SCREENING QUESTIONS:    The next two questions are to help us understand your food security.  If you are feeling you need any assistance in this area, we have resources available to support you today.    Hunger Vital Signs:  Within the past 12 months we worried whether our food would run out before we got money to buy more. Never  Within the past 12 months the food we bought just didn't last and we didn't have money to get more. Never    Advance Care Directive on file? Yes  Advance Care Directive provided to patient? N/A     Chief Complaint   Patient presents with    Consult     Establish care       Initial BP (!) 142/86 (BP Location: Right arm, Patient Position: Sitting, Cuff Size: Adult Regular)   Pulse 55   Temp 97.7  F (36.5  C) (Temporal)   Resp 18   Ht 1.822 m (5' 11.75\")   Wt 112.2 kg (247 lb 6.4 oz)   SpO2 97%   BMI 33.79 kg/m   Estimated body mass index is 33.79 kg/m  as calculated from the following:    Height as of this encounter: 1.822 m (5' 11.75\").    Weight as of this encounter: 112.2 kg (247 lb 6.4 oz).  Medication Reconciliation: complete        Angie Pelletier LPN on 7/28/2023 at 8:35 AM    "

## 2023-07-28 NOTE — PATIENT INSTRUCTIONS
Referral to Sleep Medicine - [facility] - they will call you to schedule. I put a note in about your overnight oxygen study as well, please remind them if they ask.     MRI shoulder - you will be called by [facility] [place] [facility] to schedule this.

## 2023-08-03 ENCOUNTER — HOSPITAL ENCOUNTER (OUTPATIENT)
Dept: MRI IMAGING | Facility: OTHER | Age: 68
Discharge: HOME | End: 2023-08-03
Attending: PHYSICIAN ASSISTANT | Admitting: PHYSICIAN ASSISTANT
Payer: MEDICARE

## 2023-08-03 DIAGNOSIS — M25.512 CHRONIC LEFT SHOULDER PAIN: ICD-10-CM

## 2023-08-03 DIAGNOSIS — G89.29 CHRONIC LEFT SHOULDER PAIN: ICD-10-CM

## 2023-08-03 PROCEDURE — G1010 CDSM STANSON: HCPCS

## 2023-08-04 ENCOUNTER — MYC MEDICAL ADVICE (OUTPATIENT)
Dept: FAMILY MEDICINE | Facility: OTHER | Age: 68
End: 2023-08-04
Payer: COMMERCIAL

## 2023-08-04 DIAGNOSIS — G89.29 CHRONIC LEFT SHOULDER PAIN: Primary | ICD-10-CM

## 2023-08-04 DIAGNOSIS — S46.012A TRAUMATIC INCOMPLETE TEAR OF LEFT ROTATOR CUFF, INITIAL ENCOUNTER: ICD-10-CM

## 2023-08-04 DIAGNOSIS — M25.512 CHRONIC LEFT SHOULDER PAIN: Primary | ICD-10-CM

## 2023-08-07 NOTE — TELEPHONE ENCOUNTER
Referrals in for PT and orthopedics, he will be contacted to schedule these visits.     Erica Hackett PA-C  8/7/2023  7:05 AM

## 2023-08-15 ENCOUNTER — MYC MEDICAL ADVICE (OUTPATIENT)
Dept: FAMILY MEDICINE | Facility: OTHER | Age: 68
End: 2023-08-15
Payer: COMMERCIAL

## 2023-09-14 ENCOUNTER — THERAPY VISIT (OUTPATIENT)
Dept: PHYSICAL THERAPY | Facility: OTHER | Age: 68
End: 2023-09-14
Attending: PHYSICIAN ASSISTANT
Payer: MEDICARE

## 2023-09-14 DIAGNOSIS — S46.012A TRAUMATIC INCOMPLETE TEAR OF LEFT ROTATOR CUFF, INITIAL ENCOUNTER: ICD-10-CM

## 2023-09-14 DIAGNOSIS — G89.29 CHRONIC LEFT SHOULDER PAIN: ICD-10-CM

## 2023-09-14 DIAGNOSIS — M25.512 CHRONIC LEFT SHOULDER PAIN: ICD-10-CM

## 2023-09-14 PROCEDURE — 97110 THERAPEUTIC EXERCISES: CPT | Mod: GP,PO

## 2023-09-14 PROCEDURE — 97161 PT EVAL LOW COMPLEX 20 MIN: CPT | Mod: GP,PO

## 2023-09-14 NOTE — PROGRESS NOTES
PHYSICAL THERAPY EVALUATION  Type of Visit: Evaluation    See electronic medical record for Abuse and Falls Screening details.    Subjective       Presenting condition or subjective complaint: MRI indicated torn left shoulder rotator cuff  Patient reports he injured the left shoulder as a result of slipping and falling on ice on 3/15/2023.  Patient reports that since this time he has had ongoing left shoulder pain.  He reports activities such as sleeping and reaching out to the side will increase left shoulder pain.  Patient recently had an MRI which did show a tear in the supraspinatus tendon.  Patient is scheduled for a coronary artery bypass surgery next week.  Patient is interested in learning a few shoulder exercises exercises to try prior to surgery.  He understands that following cardiothoracic surgery he will most likely have precautions that will limit his ability to perform left shoulder exercises until cleared to resume by his cardiothoracic surgeon.  Date of onset: 03/15/23    Relevant medical history: Heart problems; High blood pressure; Sleep disorder like apnea   Dates & types of surgery: 5/31/07 Left hip replacement; 8/31/12 cardiac stent; 5/1/23 right carotid artery stent;    Prior diagnostic imaging/testing results: MRI     Prior therapy history for the same diagnosis, illness or injury: No      Prior Level of Function  No functional limitations reported    Living Environment  Social support: With a significant other or spouse   Type of home: House; Multi-level   Stairs to enter the home: Yes 3 Is there a railing: No   Ramp: No   Stairs inside the home: Yes 3 Is there a railing: No   Help at home: Self Cares (home health aide/personal care attendant, family, etc); Home management tasks (cooking, cleaning); Medication and/or finances; Home and Yard maintenance tasks; Assist for driving and community activities; Emergency call system  Equipment owned:       Employment: No    Hobbies/Interests:  Hunting, fishing, kayaking, hiking, reading    Patient goals for therapy: Reach wothout pain         Objective   SHOULDER EVALUATION  PAIN: Pain Level at Rest: 0/10  Pain Level with Use: 6/10  Pain Location: Anterior/lateral left shoulder  Pain Quality: Aching and Sharp  Pain Frequency: When sleeping at night  Pain is Exacerbated By: Sleeping, reaching out to the side, certain positions, reach behind back, reaching behind head, reaching overhead  Pain is Relieved By: otc medications, rest, and changing positions    POSTURE: Mild rounded shoulder posture    ROM:   (Degrees) Left AROM Left PROM Right AROM  Right PROM   Shoulder Flexion 135 degrees-limited by pain      Shoulder Abduction 90 degrees-limited by pain      Shoulder Internal Rotation Reach behind back limited to left back pocket      Shoulder External Rotation Reach behind head limited to back of head and painful          STRENGTH:   Pain: - none + mild ++ moderate +++ severe  Strength Scale: 0-5/5 Left Right   Shoulder Flexion 4, ++ (mod) 5   Shoulder Abduction 4, ++ (mod) 5   Shoulder Internal Rotation 4+, - (none) 5   Shoulder External Rotation 4, ++ (mod) 5       SPECIAL TESTS: Neer test and Hawkin's test positive left shoulder  PALPATION: No specific pain with palpation.    Assessment & Plan   CLINICAL IMPRESSIONS  Medical Diagnosis: Chronic left shoulder pain (M25.512, G89.29), Traumatic incomplete tear of left rotator cuff, initial encounter (S46.012A)    Treatment Diagnosis: Left shoulder pain, weakness, impaired range of motion, postural dysfunction   Impression/Assessment: Patient is a 68 year old male with left shoulder pain complaints.  The following significant findings have been identified: Pain, Decreased ROM/flexibility, Decreased joint mobility, Decreased strength, Impaired muscle performance, Decreased activity tolerance, and Impaired posture. These impairments interfere with their ability to perform self care tasks, recreational  activities, and household chores as compared to previous level of function.     Clinical Decision Making (Complexity):  Clinical Presentation: Stable/Uncomplicated  Clinical Presentation Rationale: based on medical and personal factors listed in PT evaluation  Clinical Decision Making (Complexity): Low complexity    PLAN OF CARE  Treatment Interventions:  Modalities: Vasoneumatic Device  Interventions: Manual Therapy, Neuromuscular Re-education, Therapeutic Exercise    Long Term Goals     PT Goal 1  Goal Identifier: Left shoulder pain  Goal Description: Patient will report the ability to sleep through an entire night without waking from left shoulder pain.  Rationale: to maximize safety and independence with performance of ADLs and functional tasks;to maximize safety and independence with self cares;to maximize safety and independence within the home  Target Date: 12/08/23  PT Goal 2  Goal Identifier: Range of motion  Goal Description: Patient will demonstrate 140 degrees or greater left shoulder flexion to allow reaching to an overhead shelf without pain or limitation.  Rationale: to maximize safety and independence with performance of ADLs and functional tasks;to maximize safety and independence within the home;to maximize safety and independence within the community  Target Date: 12/08/23  PT Goal 3  Goal Identifier: Weakness  Goal Description: Patient will demonstrate 4+/5 grade or greater left shoulder strength in all planes to allow lifting a 1 pound object to an overhead shelf without pain or compensation.  Rationale: to maximize safety and independence with performance of ADLs and functional tasks;to maximize safety and independence within the home;to maximize safety and independence within the community  Target Date: 12/08/23      Frequency of Treatment: 10 visits  Duration of Treatment: 12 weeks      Education Assessment:   Learner/Method: Patient;Listening;Reading;Demonstration;Pictures/Video;No Barriers  to Learning    Risks and benefits of evaluation/treatment have been explained.   Patient/Family/caregiver agrees with Plan of Care.     Evaluation Time:     PT Eval, Low Complexity Minutes (22218): 15       Signing Clinician: ALON Ponce [facility]                                                                                   OUTPATIENT PHYSICAL THERAPY      PLAN OF TREATMENT FOR OUTPATIENT REHABILITATION   Patient's Last Name, First Name, Kirk Maldonado  LAZARO YOB: 1955   Provider's Name   [facility]   Medical Record No.  [id number]     Onset Date: 03/15/23  Start of Care Date: 09/14/23     Medical Diagnosis:  Chronic left shoulder pain (M25.512, G89.29), Traumatic incomplete tear of left rotator cuff, initial encounter (S46.012A)      PT Treatment Diagnosis:  Left shoulder pain, weakness, impaired range of motion, postural dysfunction Plan of Treatment  Frequency/Duration: 10 visits/ 12 weeks    Certification date from 09/14/23 to 12/08/23         See note for plan of treatment details and functional goals     Graham Goode, PT                         I CERTIFY THE NEED FOR THESE SERVICES FURNISHED UNDER        THIS PLAN OF TREATMENT AND WHILE UNDER MY CARE     (Physician attestation of this document indicates review and certification of the therapy plan).                Referring Provider:  Erica Hackett      Initial Assessment  See Epic Evaluation- Start of Care Date: 09/14/23

## 2023-10-04 ENCOUNTER — OFFICE VISIT (OUTPATIENT)
Dept: FAMILY MEDICINE | Facility: OTHER | Age: 68
End: 2023-10-04
Attending: PHYSICIAN ASSISTANT
Payer: COMMERCIAL

## 2023-10-04 VITALS
SYSTOLIC BLOOD PRESSURE: 126 MMHG | BODY MASS INDEX: 33.54 KG/M2 | RESPIRATION RATE: 18 BRPM | TEMPERATURE: 97.8 F | DIASTOLIC BLOOD PRESSURE: 74 MMHG | OXYGEN SATURATION: 98 % | HEIGHT: 71 IN | HEART RATE: 60 BPM | WEIGHT: 239.6 LBS

## 2023-10-04 DIAGNOSIS — S46.012A TRAUMATIC INCOMPLETE TEAR OF LEFT ROTATOR CUFF, INITIAL ENCOUNTER: ICD-10-CM

## 2023-10-04 DIAGNOSIS — G47.30 SLEEP APNEA, UNSPECIFIED TYPE: ICD-10-CM

## 2023-10-04 DIAGNOSIS — I25.810 CORONARY ARTERY DISEASE INVOLVING AUTOLOGOUS ARTERY CORONARY BYPASS GRAFT WITHOUT ANGINA PECTORIS: ICD-10-CM

## 2023-10-04 DIAGNOSIS — Z09 HOSPITAL DISCHARGE FOLLOW-UP: Primary | ICD-10-CM

## 2023-10-04 DIAGNOSIS — Z95.1 S/P CABG (CORONARY ARTERY BYPASS GRAFT): Primary | ICD-10-CM

## 2023-10-04 PROCEDURE — 99215 OFFICE O/P EST HI 40 MIN: CPT | Performed by: PHYSICIAN ASSISTANT

## 2023-10-04 PROCEDURE — 99496 TRANSJ CARE MGMT HIGH F2F 7D: CPT | Performed by: PHYSICIAN ASSISTANT

## 2023-10-04 PROCEDURE — G0463 HOSPITAL OUTPT CLINIC VISIT: HCPCS

## 2023-10-04 PROCEDURE — G0463 HOSPITAL OUTPT CLINIC VISIT: HCPCS | Performed by: PHYSICIAN ASSISTANT

## 2023-10-04 RX ORDER — LISINOPRIL 2.5 MG/1
2.5 TABLET ORAL DAILY
Qty: 90 TABLET | Refills: 3 | Status: SHIPPED | OUTPATIENT
Start: 2023-10-04 | End: 2024-01-31

## 2023-10-04 ASSESSMENT — PAIN SCALES - GENERAL: PAINLEVEL: NO PAIN (0)

## 2023-10-04 NOTE — PROGRESS NOTES
Assessment & Plan       ICD-10-CM    1. Hospital discharge follow-up  Z09       2. Coronary artery disease involving autologous artery coronary bypass graft without angina pectoris  I25.810 Echocardiogram Complete     lisinopril (ZESTRIL) 2.5 MG tablet      3. Traumatic incomplete tear of left rotator cuff, initial encounter  S46.012A       4. Sleep apnea, unspecified type  G47.30         Reviewed hospital and postdischarge course with patient.  I have placed order for an echocardiogram, this will be completed in 3 to 4 weeks due to constrictive physiology and small anterior pericardial effusion. Kirk was contacted to schedule this already.  I also discussed this case with cardiology, they were gracious enough to offer patient an appointment on 10/11/2023, patient was contacted in regards to this.  He also starts cardiac rehab on 10/10/2023.   Continue 81 mg aspirin lifelong.  Continue Plavix 75 mg daily for 3 months postoperatively for CABG.  Blood pressure is well controlled, continue metoprolol 6.25 mg twice daily, lisinopril 2.5 mg daily (refill sent through to pharmacy). Continue Crestor, will continue to monitor for myopathy or other side effects.  Continue with amiodarone, I kindly appreciate the assistance of cardiology and management of medication for postoperative A-fib.  Lower extremity edema is improving, continue with low-salt diet, close monitoring of weight.  Return precautions reviewed (greater than 5 pounds weight gain in 1 day, chest pain, shortness of breath progression, etc.).  I would also like to follow-up on a CBC, CMP and magnesium in 1 month, office visit was scheduled.  Continue with incentive spirometer.  Incision sites are healing quite well.  Return precautions reviewed.  Discussed ongoing management, would recommend to wait an additional 2 to 3 months prior to re initiation of physical therapy to allow for cardiac rehab to take place and patient to recover from recent surgery.  He is  agreeable to this.  He will continue with very gentle exercises at home and resume physical therapy once ready.  We will continue to follow sleep apnea, as discussed in previous office note, he did have a overnight oximetry study reviewed by one of the providers at [facility] a few months back which was consistent with sleep apnea.  I did place a referral for sleep medicine to Dr. Valdivia at [facility], I recommend he schedule this in the next 2 to 3 months.    We also did review immunizations including influenza, RSV and COVID booster.  I recommend to hold off in the short-term to allow him to recover from cardiac surgery.  He will also discuss this with cardiology at his upcoming visit.     MED REC REQUIRED=  Post Medication Reconciliation Status: discharge medications reconciled, continue medications without change    See Patient Instructions    A total of 63 minutes was spent on patient care, 36 minutes was spent face-to-face on patient care with an additional 5 minutes on consultation with cardiology, 22 minutes spent on the following: Documentation, chart review of intraoperative/procedure notes, hospitalization stay, laboratory and imaging results) and other activities per review of chart.    Return in about 4 weeks (around 11/1/2023) for Medication follow up.    Erica Hackett PA-C  [facility]    Subjective   Kirk is a 68 year old, presenting for the following health issues:  Hospital F/U      10/4/2023     1:44 PM   Additional Questions   Roomed by Kassy BIRD CNA/BRIAN     History of Present Illness       Vascular Disease:  He presents for follow up of vascular disease.      He takes daily aspirin.    He eats 2-3 servings of fruits and vegetables daily.He consumes 1 sweetened beverage(s) daily.He exercises with enough effort to increase his heart rate 9 or less minutes per day.    He is taking medications regularly.     Kirk presents today with his wife, Geno, in follow up from  recent hospitalization at [facility] on the below dates for coronary artery bypass graft x3, internal mammary artery; closure of atrial septum PFO with Dr. Roger on 9/22/23.  Kirk states he is overall feeling much better after surgery.  Incision sites are healing well, x2 to left thigh, sternal incision and x4 to chest tube sites.  He recently completed a 7-day course of potassium and furosemide.  He is down approximately 9 pounds since discharge and 20 pounds overall this year.  His lower extremity edema is much improved, his left leg is still slightly more swollen than the right, however, this was told to be expected as this was the surgical site. Kirk does still continue to have some shortness of breath with activity, this is slowly improving over time.  He is trying to stay quite active with walking and gentle activity as tolerated.  He declines any chest pain or centralized edema, vision changes or other neurologic changes.     Significant cardiac history: Left hemiparesis status post RCA stenting.  Echocardiogram at this time revealed small right to left atrial shunt.  CTA showed multivessel coronary artery disease.  He underwent a PCI to ramus branch in 2012 (outside facility).  Coronary angiogram 06/20/2023 showing 90% stenosis of LAD, 100% RCA and ramus 60% - performed at [facility] by Dr. Boateng.  He was having increasing fatigue and chest tightness, prompting the above surgeries on 9/22/23.     Comorbid conditions: Hypertension, hyperlipidemia, prediabetes, CKD stage II, 9-pack-year history (quit in 1982), obesity (BMI of 33.42), obstructive sleep apnea, subclinical CVA, hemiparesis on the left in May 2023 (as noted above status post RCA stenting).     Medication updates:  START:  Lisinopril decreased from 10 mg to 2.5 mg daily  Metoprolol initiated and titrated up to 6.25 mg twice daily  Amiodarone taper for postoperative atrial fibrillation, 200 mg twice daily x5 days, followed by 200 mg daily x30  "days (assessment by cardiology for continuation at this time)  Aspirin 81 mg daily lifelong  Plavix 75 mg daily x 3 months postoperative  Lasix 20 mg daily x7 days, potassium 20 mEq daily x7 days  Colchicine 0.6 mg twice daily x6 weeks  CONTINUE:   Crestor 40 mg daily  STOP:   [facility]    [facility] Follow-up Visit:    Hospital/Nursing Home/IP Rehab Facility:  [facility]  Date of Admission: 09/22/2023  Date of Discharge: 09/27/2023  Reason(s) for Admission: Coronary Artery Bypass Surgery    Was your hospitalization related to COVID-19? No   Problems taking medications regularly:  None  Medication changes since discharge: None  Problems adhering to non-medication therapy:  None    Summary of hospitalization:  Care Everywhere information obtained and reviewed  Diagnostic Tests/Treatments reviewed.  Follow up needed: ECHO x 1 month out, close cardiology follow up  Other Healthcare Providers Involved in Patient s Care:          cardiac rehab, cardiology  Update since discharge: improved.         Plan of care communicated with patient and family           Review of Systems   Constitutional, HEENT, cardiovascular, pulmonary, GI, , musculoskeletal, neuro, skin, endocrine and psych systems are negative, except as otherwise noted.      Objective    /74 (BP Location: Right arm, Patient Position: Sitting, Cuff Size: Adult Regular)   Pulse 60   Temp 97.8  F (36.6  C) (Temporal)   Resp 18   Ht 1.803 m (5' 11\")   Wt 108.7 kg (239 lb 9.6 oz)   SpO2 98%   BMI 33.42 kg/m    Body mass index is 33.42 kg/m .  Physical Exam   GENERAL: healthy, alert and no distress  EYES: Eyes grossly normal to inspection, PERRL and conjunctivae and sclerae normal  NECK: no adenopathy, no asymmetry, masses, or scars and thyroid normal to palpation  RESP: lungs clear to auscultation - no rales, rhonchi or wheezes  CV: regular rate and rhythm, normal S1 S2, no S3 or S4, no murmur, click or rub, no peripheral edema and " peripheral pulses strong  ABDOMEN: soft, non tender, no hepatosplenomegaly, no masses and bowel sounds normal  MS: no gross musculoskeletal defects noted, no edema  SKIN: incision sites: Sternotomy site is healing well without any signs of infection. X2 sites to right medial thigh (excision site for greater saphenous vein harvest) are healing well without signs of infection.  Incision sites are mediastinal and pleural chest tubes healing well.  NEURO: Normal strength and tone, mentation intact and speech normal  PSYCH: mentation appears normal, affect normal/bright    ECHO ([facility]) from 9/27/202.: Left ventricular ejection fraction of 66%.  Indeterminate left ventricular diastolic function.  Normal right ventricular chamber size, mildly reduced RV systolic function (pressure 33 mmHg ventricular, 10 mmHg right atrial).  Small anterior pericardial effusion.  Overall constrictive physiology.

## 2023-10-04 NOTE — Clinical Note
Thank you SO incredibly much for working in Kirk, I called him and he is very grateful for your time :)  Thank you again!  Erica

## 2023-10-04 NOTE — NURSING NOTE
"Chief Complaint   Patient presents with    Hospital F/U     Patient reports to be following up on his hospital stay from surgery at [facility] along with looking for more information regarding possibly doing a sleep study and to discuss his rotator cuff injury.    Initial /74 (BP Location: Right arm, Patient Position: Sitting, Cuff Size: Adult Regular)   Pulse 60   Temp 97.8  F (36.6  C) (Temporal)   Resp 18   Ht 1.803 m (5' 11\")   Wt 108.7 kg (239 lb 9.6 oz)   SpO2 98%   BMI 33.42 kg/m   Estimated body mass index is 33.42 kg/m  as calculated from the following:    Height as of this encounter: 1.803 m (5' 11\").    Weight as of this encounter: 108.7 kg (239 lb 9.6 oz).       FOOD SECURITY SCREENING QUESTIONS:    The next two questions are to help us understand your food security.  If you are feeling you need any assistance in this area, we have resources available to support you today.    Hunger Vital Signs:  Within the past 12 months we worried whether our food would run out before we got money to buy more. Never  Within the past 12 months the food we bought just didn't last and we didn't have money to get more. Never  Kassy Jackson LPN on 10/4/2023 at 1:49 PM     Kassy Jackson   "

## 2023-10-05 DIAGNOSIS — Z95.1 S/P CABG (CORONARY ARTERY BYPASS GRAFT): Primary | ICD-10-CM

## 2023-10-10 ENCOUNTER — HOSPITAL ENCOUNTER (OUTPATIENT)
Dept: CARDIAC REHAB | Facility: OTHER | Age: 68
Discharge: HOME | End: 2023-10-10
Attending: STUDENT IN AN ORGANIZED HEALTH CARE EDUCATION/TRAINING PROGRAM
Payer: MEDICARE

## 2023-10-10 PROCEDURE — 93798 PHYS/QHP OP CAR RHAB W/ECG: CPT

## 2023-10-10 RX ORDER — CLOPIDOGREL BISULFATE 75 MG/1
75 TABLET ORAL DAILY
COMMUNITY
End: 2023-11-20

## 2023-10-10 RX ORDER — ASPIRIN 81 MG/1
81 TABLET ORAL DAILY
COMMUNITY

## 2023-10-10 RX ORDER — METOPROLOL TARTRATE 25 MG/1
25 TABLET, FILM COATED ORAL 2 TIMES DAILY
COMMUNITY
End: 2023-11-01

## 2023-10-10 RX ORDER — AMIODARONE HYDROCHLORIDE 200 MG/1
200 TABLET ORAL DAILY
COMMUNITY
End: 2023-12-14

## 2023-10-10 RX ORDER — ACETAMINOPHEN 500 MG
500-1000 TABLET ORAL EVERY 6 HOURS PRN
COMMUNITY

## 2023-10-10 RX ORDER — COLCHICINE 0.6 MG/1
0.6 TABLET ORAL 2 TIMES DAILY
COMMUNITY
End: 2023-11-14

## 2023-10-10 ASSESSMENT — PATIENT HEALTH QUESTIONNAIRE - PHQ9: SUM OF ALL RESPONSES TO PHQ QUESTIONS 1-9: 4

## 2023-10-11 ENCOUNTER — OFFICE VISIT (OUTPATIENT)
Dept: CARDIOLOGY | Facility: OTHER | Age: 68
End: 2023-10-11
Attending: NURSE PRACTITIONER
Payer: COMMERCIAL

## 2023-10-11 VITALS
DIASTOLIC BLOOD PRESSURE: 72 MMHG | SYSTOLIC BLOOD PRESSURE: 133 MMHG | HEIGHT: 71 IN | BODY MASS INDEX: 33.07 KG/M2 | OXYGEN SATURATION: 99 % | HEART RATE: 64 BPM | WEIGHT: 236.2 LBS | TEMPERATURE: 97.3 F

## 2023-10-11 DIAGNOSIS — I31.8 RESTRICTIVE PERICARDITIS: ICD-10-CM

## 2023-10-11 DIAGNOSIS — Z95.1 S/P CABG X 3: Primary | ICD-10-CM

## 2023-10-11 DIAGNOSIS — R60.0 LOWER EXTREMITY EDEMA: ICD-10-CM

## 2023-10-11 DIAGNOSIS — E78.5 HYPERLIPIDEMIA LDL GOAL <70: ICD-10-CM

## 2023-10-11 DIAGNOSIS — I48.91 POSTOPERATIVE ATRIAL FIBRILLATION (H): ICD-10-CM

## 2023-10-11 DIAGNOSIS — Z95.828 HISTORY OF RIGHT COMMON CAROTID ARTERY STENT PLACEMENT: ICD-10-CM

## 2023-10-11 DIAGNOSIS — Z95.5 HISTORY OF CORONARY ARTERY STENT PLACEMENT: ICD-10-CM

## 2023-10-11 DIAGNOSIS — R73.03 PREDIABETES: ICD-10-CM

## 2023-10-11 DIAGNOSIS — Z87.74 S/P PATENT FORAMEN OVALE CLOSURE: ICD-10-CM

## 2023-10-11 DIAGNOSIS — I97.89 POSTOPERATIVE ATRIAL FIBRILLATION (H): ICD-10-CM

## 2023-10-11 DIAGNOSIS — Z98.890 HISTORY OF RIGHT COMMON CAROTID ARTERY STENT PLACEMENT: ICD-10-CM

## 2023-10-11 DIAGNOSIS — I25.10 ATHEROSCLEROSIS OF NATIVE CORONARY ARTERY OF NATIVE HEART WITHOUT ANGINA PECTORIS: ICD-10-CM

## 2023-10-11 PROCEDURE — 93010 ELECTROCARDIOGRAM REPORT: CPT | Performed by: INTERNAL MEDICINE

## 2023-10-11 PROCEDURE — 93005 ELECTROCARDIOGRAM TRACING: CPT | Performed by: NURSE PRACTITIONER

## 2023-10-11 PROCEDURE — 99205 OFFICE O/P NEW HI 60 MIN: CPT | Performed by: NURSE PRACTITIONER

## 2023-10-11 PROCEDURE — G0463 HOSPITAL OUTPT CLINIC VISIT: HCPCS

## 2023-10-11 RX ORDER — POTASSIUM CHLORIDE 750 MG/1
TABLET, EXTENDED RELEASE ORAL
Qty: 120 TABLET | Refills: 1 | Status: SHIPPED | OUTPATIENT
Start: 2023-10-11 | End: 2024-04-03

## 2023-10-11 RX ORDER — FUROSEMIDE 20 MG
TABLET ORAL
Qty: 90 TABLET | Refills: 1 | Status: SHIPPED | OUTPATIENT
Start: 2023-10-11 | End: 2024-01-31

## 2023-10-11 RX ORDER — METOPROLOL SUCCINATE 25 MG/1
12.5 TABLET, EXTENDED RELEASE ORAL DAILY
Qty: 45 TABLET | Refills: 3 | Status: SHIPPED | OUTPATIENT
Start: 2023-10-11 | End: 2024-07-08

## 2023-10-11 ASSESSMENT — PAIN SCALES - GENERAL: PAINLEVEL: NO PAIN (0)

## 2023-10-11 NOTE — PROGRESS NOTES
[facility] HEART CARE   CARDIOLOGY CONSULT     Kirk John   [id number] [address] [address]    Erica Hackett        HPI:   Mrs. John is 68 year old male who presents to [facility] cardiology care s/p 3V CABG with PFO closure on 9/22/2023 at [facility]. Patient has a history of HLD, CAD with history of coronary stent placement in 2012, prediabetes, diverticulosis and remote history of tobacco use.     Patient has history of TIA, symptoms included slurred speech, left hemiparesis and left facial droop.  Symptoms lasted approximately 20 to 30 minutes with complete resolution.  CT of the head was unremarkable for any acute intracranial findings, CTA demonstrated high-grade stenosis of the right ICA.  He underwent successful stent placement to the right ICA on 5/1/2023.    He has known CAD with a 2.5 x 38 mm Promus drug-eluting stent to the proximal circumflex in 2012 in the setting of stable angina.      Transthoracic echocardiogram on 5/1/2023 demonstrated normal LV chamber size, and normal regionality, with LVEF 57%, grade 1 diastolic dysfunction, normal RV chamber size and systolic function, no hemodynamically significant valve disease, and a small right-to-left atrial shunt which increases to severe with Valsalva release.    S/p Coronary Angiography on 6/16/2023 demonstrating three-vessel CAD with severe pLAD stenosis.  Circumflex consists of large ramus branch with prior stenting revealing discrete in-stent restenosis-moderate in severity.  The RCA was proximally occluded with right to right and left to right collaterals.    S/p 3V CABG (LIMA-LAD, rSVG-OM/RCA) with PFO closure by Dr. Roger at [facility] on 9/22/2023. He was discharged on Amiodarone taper in setting of post-op AF. Post op echo revealing restrictive pericarditis, treated with colchicine for 6 weeks, no NSAID's. Repeat TTE recommended one month post-op.    Today, patient reports feeling good. Pain controlled at sternotomy site,  no use of pain medications at this time. No palpations or recurrence of AF symptoms. No increased dyspnea at rest. No orthopnea or PND. Positive for persisting edema in left LE (site of SVG). No chest pain or pressure. No popping or clicking at sternotomy site. No lightheadedness or syncope.       PAST MEDICAL HISTORY:   Past Medical History:   Diagnosis Date    Abnormal result of other cardiovascular function study (CODE)     No Comments Provided    Atherosclerotic heart disease of native coronary artery without angina pectoris     No Comments Provided    Chest pain     No Comments Provided    Essential (primary) hypertension     No Comments Provided    Hyperlipidemia     No Comments Provided    Osteoarthritis     No Comments Provided          FAMILY HISTORY:   Family History   Problem Relation Age of Onset    Cancer Father         Cancer,Lung    Peripheral Vascular Disease Father     Heart Disease Mother 70        Heart Disease,CAD/MI    Pacemaker Sister     Heart Disease Maternal Grandfather 60        Heart Disease,CAD    Heart Disease Maternal Uncle 60        Heart Disease,CAD    No Known Problems Sister     Diabetes No family hx of     Prostate Cancer No family hx of     Colon Cancer No family hx of           PAST SURGICAL HISTORY:   Past Surgical History:   Procedure Laterality Date    ARTHROPLASTY HIP      5/2007,Left total hip with Dr Graham Escalante    COLONOSCOPY      6/3/05,Colonoscopy, normal.  F/u in 10 years    COLONOSCOPY  01/25/2016    Normal exam,  F/U 2026    CV CARDIAC CATHERIZATION  08/31/2012    proximal circumflex, [facility]    RELEASE CARPAL TUNNEL      5/04,Right carpal tunnel sugery    TONSILLECTOMY, ADENOIDECTOMY, COMBINED      T&A as a child          SOCIAL HISTORY:   Social History     Socioeconomic History    Marital status:      Spouse name: None    Number of children: None    Years of education: None    Highest education level: None   Tobacco Use    Smoking status: Former     Types:  Cigarettes     Quit date: 10/11/1984     Years since quittin.0     Passive exposure: Never    Smokeless tobacco: Never    Tobacco comments:     Last smoked over 40 years ago-early 80's   Vaping Use    Vaping Use: Never used   Substance and Sexual Activity    Alcohol use: Yes     Comment: 2 a week    Drug use: Never    Sexual activity: Yes     Partners: Female   Social History Narrative    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  . 3 daughters and 4 grandkids.  Likes to hunt and fish.  Plays pickleball.     Social Determinants of Health     Financial Resource Strain: Low Risk  (10/1/2023)    Financial Resource Strain     Within the past 12 months, have you or your family members you live with been unable to get utilities (heat, electricity) when it was really needed?: No   Food Insecurity: Low Risk  (10/1/2023)    Food Insecurity     Within the past 12 months, did you worry that your food would run out before you got money to buy more?: No     Within the past 12 months, did the food you bought just not last and you didn t have money to get more?: No   Transportation Needs: Low Risk  (10/1/2023)    Transportation Needs     Within the past 12 months, has lack of transportation kept you from medical appointments, getting your medicines, non-medical meetings or appointments, work, or from getting things that you need?: No   Housing Stability: Low Risk  (10/1/2023)    Housing Stability     Do you have housing? : Yes     Are you worried about losing your housing?: No          CURRENT MEDICATIONS:   Prior to Admission medications    Medication Sig Start Date End Date Taking? Authorizing Provider   acetaminophen (TYLENOL) 500 MG tablet Take 500-1,000 mg by mouth every 6 hours as needed for mild pain Take at bedtime as needed for shoulder pain   Yes Reported, Patient   amiodarone (PACERONE) 200 MG tablet Take 200 mg by mouth daily Take 1 tablet by mouth 2 times per day x 5 days and then 1 tablet daily.   Yes  Reported, Patient   aspirin 81 MG EC tablet Take 81 mg by mouth daily   Yes Reported, Patient   blood glucose (NO BRAND SPECIFIED) test strip Use to test blood sugar 1 times daily. Dx: R73.03 12/14/22  Yes Calin Rubin MD   blood glucose monitoring (ACCU-CHEK ENEDINA PLUS) meter device kit Use to test blood sugar 1 times daily. Dx: R73.03 3/8/19  Yes Calin Rubin MD   blood glucose monitoring (ACCU-CHEK MULTICLIX) lancets Use to test blood sugar 1 times daily. Dx: R73.03 12/14/22  Yes Calin Rubin MD   cholecalciferol (VITAMIN D3) 125 mcg (5000 units) capsule Take 125 mcg by mouth daily   Yes Reported, Patient   clopidogrel (PLAVIX) 75 MG tablet Take 75 mg by mouth daily   Yes Reported, Patient   colchicine (COLCRYS) 0.6 MG tablet Take 0.6 mg by mouth 2 times daily   Yes Reported, Patient   lisinopril (ZESTRIL) 2.5 MG tablet Take 1 tablet (2.5 mg) by mouth daily 10/4/23  Yes Erica Hackett PA-C   metoprolol tartrate (LOPRESSOR) 25 MG tablet Take 25 mg by mouth 2 times daily Take 1/4 tablet twice a day   Yes Reported, Patient   Multiple Vitamins-Minerals (MULTIVITAMIN & MINERAL PO) Take 1 tablet by mouth   Yes Reported, Patient   nitroGLYcerin (NITROSTAT) 0.4 MG sublingual tablet PLACE 1 TABLET UNDER THE TONGUE EVERY 5 MINUTES IF NEEDED FOR CHEST PAIN. 12/14/22  Yes Calin Rubin MD   rosuvastatin (CRESTOR) 40 MG tablet  6/15/23  Yes Reported, Patient          ALLERGIES:   Allergies   Allergen Reactions    Poison Ivy Extract Other (See Comments)     Hospitalized as a child, whole body reaction including the throat.          ROS:   CONSTITUTIONAL: No reported fever or chills.  Weight stable.  ENT: No visual disturbance, ear ache, epistaxis or sore throat.   CARDIOVASCULAR: No chest pain, chest pressure or chest discomfort. No palpitations. Positive for LE edema on left.  RESPIRATORY: No shortness of breath, cough, wheezing or hemoptysis.  No orthopnea or PND.  GI: No reported abdominal pain,  "melena or hematochezia.  : No reported hematuria or dysuria.   NEUROLOGICAL: No lightheadedness, dizziness, syncope, ataxia, paresthesias or weakness.   HEMATOLOGIC: No history of anemia. No bleeding or excessive bruising. No history of blood clots.   MUSCULOSKELETAL: No new joint pain or swelling, no muscle pain.  ENDOCRINOLOGIC: No temperature intolerance. No hair or skin changes.  SKIN: No abnormal rashes or sores, no unusual itching.  PSYCHIATRIC: No history of depression or anxiety. No changes in mood, feeling down or anxious. No changes in sleep.      PHYSICAL EXAM:   /72 (BP Location: Right arm, Patient Position: Sitting, Cuff Size: Adult Regular)   Pulse 64   Temp 97.3  F (36.3  C) (Tympanic)   Ht 1.803 m (5' 11\")   Wt 107.1 kg (236 lb 3.2 oz)   SpO2 99%   BMI 32.94 kg/m    GENERAL: The patient is a well-developed, well-nourished, in no apparent distress.  HEENT: Head is normocephalic and atraumatic. Eyes are symmetrical with normal visual tracking. No icterus, no xanthelasmas. Nares appeared normal without nasal drainage. Mucous membranes are moist, no cyanosis.  NECK: Supple, no cervical bruits, JVP not visible.   CHEST/ LUNGS: Lungs clear to auscultation, no rales, rhonchi or wheezes, no use of accessory muscles, no retractions, respirations unlabored and normal respiratory rate.   CARDIO: Regular rate and rhythm normal with S1 and S2, no S3 or S4 and no murmur, click or rub. Precordium quiet with normal PMI.  Stable sternotomy site.   ABD: Abdomen is mildly distended. No palpable masses.  EXTREMITIES: No clubbing or cyanosis. Pedal pulses normal and equal bilaterally. Positive for 1+ pitting edema on left.  MUSCULOSKELETAL: No visible joint swelling.   NEUROLOGIC: Alert and oriented X3. Normal speech, gait and affect. No focal neurologic deficits.   SKIN: No jaundice. No rashes or visible skin lesions present. No ecchymosis. Sternotomy site healing well, drain sites well approximated " without erythema or drainage. Left leg incisions healing well without drainage or erythema.     EKG:    Sinus rhythm with first AV block, T wave abnormality, consider inferior ischemia.     LAB RESULTS:   No visits with results within 3 Month(s) from this visit.   Latest known visit with results is:   Office Visit on 04/14/2023   Component Date Value Ref Range Status    Ventricular Rate 04/14/2023 50  BPM Final    Atrial Rate 04/14/2023 50  BPM Final    OR Interval 04/14/2023 238  ms Final    QRS Duration 04/14/2023 108  ms Final    QT 04/14/2023 434  ms Final    QTc 04/14/2023 395  ms Final    P Axis 04/14/2023 62  degrees Final    R AXIS 04/14/2023 -4  degrees Final    T Axis 04/14/2023 -5  degrees Final    Interpretation ECG 04/14/2023    Final                    Value:Sinus bradycardia with 1st degree A-V block  Otherwise normal ECG  No previous ECGs available  Confirmed by DO LANTIGUA STACY ([id number]) on 4/19/2023 4:51:59 AM            ASSESSMENT:   Kirk John presents to hospitals cardiology care s/p 3V CABG with PFO closure on 9/22/2023 at [facility]. Patient has a history of HLD, CAD with history of coronary stent placement in 2012, prediabetes, diverticulosis and remote history of tobacco use.   Today, patient reports feeling good. Pain controlled at sternotomy site, no use of pain medications at this time. No palpations or recurrence of AF symptoms. No increased dyspnea at rest. No orthopnea or PND. Positive for persisting edema in left LE (site of SVG). No chest pain or pressure. No popping or clicking at sternotomy site. No lightheadedness or syncope.     PLAN:  1. S/P CABG x 3 (9/22/2023)  S/p 3V CABG (LIMA-LAD, rSVG-OM/RCA) with PFO closure by Dr. Roger at [facility] on 9/22/2023.   Continue on Plavix 75 mg for 3 months post-op.   Continue on ASA 81 mg daily lifelong.   Continue on Crestor 40 mg daily with LDL goal 70 or less recommended.   Reviewed sternotomy restrictions up to 12 weeks post-op.  No  dental work or cleanings for 6 months postop.  He is scheduled to start phase II cardiac rehab.     2. Atherosclerosis of native coronary artery of native heart without angina pectoris    3. Lower extremity edema  Edema following SVG.  Restart Lasix, take potassium with lasix.     - furosemide (LASIX) 20 MG tablet; Take one tab daily in the morning for 7 days, after this time you may take up to one tab daily for increased edema.  Dispense: 90 tablet; Refill: 1    4. History of coronary artery stent placement (2012)  Known CAD with a 2.5 x 38 mm Promus drug-eluting stent to the proximal circumflex in 2012.    5. History of right common carotid artery stent placement (5/1/2023)    6. Prediabetes  Continued monitoring by PCP.     7. Hyperlipidemia LDL goal <70  Continue on rosuvastatin 40 mg nightly.    8. S/P patent foramen ovale closure (9/22/2023)    9. Restrictive pericarditis- Post op CABG  Repeat echocardiogram one month post-op, this has been scheduled.   Complete full 6 weeks of Colchicine.   No chest pain or pressure, asymptomatic.     10. Postoperative atrial fibrillation (H)  Complete Amiodarone taper.   No recurrence of AF identified.     Orders Placed This Encounter   Procedures    EKG 12-lead, tracing only (Same Day)     Thank you for allowing me to participate in the care of your patient. Please do not hesitate to contact me if you have any questions.     Total time 63 minutes on date of encounter spent reviewing records, face-to-face time performing HPI, physical exam, reviewing results of recent testing and counseling on the above diagnoses and recommended plan of care.    Corazon Dawkins, APRN CNP [facility]

## 2023-10-11 NOTE — PATIENT INSTRUCTIONS
Complete course of Amiodarone and course of Colchicine until out of both medication.   Remain on Plavix for 3 months post-op, remain on Aspirin 81 mg daily longstanding.   No dental work for cleanings for 6 months post-op.  Repeat echocardiogram scheduled.   Follow-up with cardiology clinic at 12 weeks post op.   Stop Metoprolol Tartrate and begin Toprol XL 12.5 mg at bedtime.   Restart Lasix 20 mg daily in the morning for one week, after this time you may then take one tab daily as needed. Take potassium 20 meq on the days you take Lasix.

## 2023-10-11 NOTE — NURSING NOTE
"No chief complaint on file.      Initial /72 (BP Location: Right arm, Patient Position: Sitting, Cuff Size: Adult Regular)   Pulse 64   Temp 97.3  F (36.3  C) (Tympanic)   Ht 1.803 m (5' 11\")   Wt 107.1 kg (236 lb 3.2 oz)   SpO2 99%   BMI 32.94 kg/m   Estimated body mass index is 32.94 kg/m  as calculated from the following:    Height as of this encounter: 1.803 m (5' 11\").    Weight as of this encounter: 107.1 kg (236 lb 3.2 oz).    FOOD SECURITY SCREENING QUESTIONS:    The next two questions are to help us understand your food security.  If you are feeling you need any assistance in this area, we have resources available to support you today.    Hunger Vital Signs:  Within the past 12 months we worried whether our food would run out before we got money to buy more. Never  Within the past 12 months the food we bought just didn't last and we didn't have money to get more. Never    Kandice Martin CNA    Advance Care Directive reviewed  "

## 2023-10-13 LAB
ATRIAL RATE - MUSE: 60 BPM
DIASTOLIC BLOOD PRESSURE - MUSE: NORMAL MMHG
INTERPRETATION ECG - MUSE: NORMAL
P AXIS - MUSE: 33 DEGREES
PR INTERVAL - MUSE: 228 MS
QRS DURATION - MUSE: 106 MS
QT - MUSE: 396 MS
QTC - MUSE: 396 MS
R AXIS - MUSE: 55 DEGREES
SYSTOLIC BLOOD PRESSURE - MUSE: NORMAL MMHG
T AXIS - MUSE: -77 DEGREES
VENTRICULAR RATE- MUSE: 60 BPM

## 2023-10-23 ENCOUNTER — HOSPITAL ENCOUNTER (OUTPATIENT)
Dept: CARDIAC REHAB | Facility: OTHER | Age: 68
Discharge: HOME | End: 2023-10-23
Attending: STUDENT IN AN ORGANIZED HEALTH CARE EDUCATION/TRAINING PROGRAM
Payer: MEDICARE

## 2023-10-23 ENCOUNTER — HOSPITAL ENCOUNTER (OUTPATIENT)
Dept: CARDIOLOGY | Facility: OTHER | Age: 68
Discharge: HOME | End: 2023-10-23
Attending: PHYSICIAN ASSISTANT | Admitting: PHYSICIAN ASSISTANT
Payer: MEDICARE

## 2023-10-23 DIAGNOSIS — I25.810 CORONARY ARTERY DISEASE INVOLVING AUTOLOGOUS ARTERY CORONARY BYPASS GRAFT WITHOUT ANGINA PECTORIS: ICD-10-CM

## 2023-10-23 LAB — LVEF ECHO: NORMAL

## 2023-10-23 PROCEDURE — 93306 TTE W/DOPPLER COMPLETE: CPT | Mod: 26 | Performed by: INTERNAL MEDICINE

## 2023-10-23 PROCEDURE — 93798 PHYS/QHP OP CAR RHAB W/ECG: CPT

## 2023-10-23 PROCEDURE — 93306 TTE W/DOPPLER COMPLETE: CPT

## 2023-10-25 ENCOUNTER — HOSPITAL ENCOUNTER (OUTPATIENT)
Dept: CARDIAC REHAB | Facility: OTHER | Age: 68
Discharge: HOME | End: 2023-10-25
Attending: STUDENT IN AN ORGANIZED HEALTH CARE EDUCATION/TRAINING PROGRAM
Payer: MEDICARE

## 2023-10-25 PROCEDURE — 93798 PHYS/QHP OP CAR RHAB W/ECG: CPT

## 2023-10-25 PROCEDURE — 93797 PHYS/QHP OP CAR RHAB WO ECG: CPT | Mod: XU

## 2023-10-27 ENCOUNTER — HOSPITAL ENCOUNTER (OUTPATIENT)
Dept: CARDIAC REHAB | Facility: OTHER | Age: 68
Discharge: HOME | End: 2023-10-27
Attending: STUDENT IN AN ORGANIZED HEALTH CARE EDUCATION/TRAINING PROGRAM
Payer: MEDICARE

## 2023-10-27 PROCEDURE — 93798 PHYS/QHP OP CAR RHAB W/ECG: CPT

## 2023-10-29 PROBLEM — Z95.828 HISTORY OF RIGHT COMMON CAROTID ARTERY STENT PLACEMENT: Status: ACTIVE | Noted: 2023-05-02

## 2023-10-29 PROBLEM — M05.79 RHEUMATOID ARTHRITIS INVOLVING MULTIPLE SITES WITH POSITIVE RHEUMATOID FACTOR (H): Status: ACTIVE | Noted: 2023-10-29

## 2023-10-29 PROBLEM — Z95.828 PRESENCE OF OTHER VASCULAR IMPLANTS AND GRAFTS: Status: ACTIVE | Noted: 2023-05-08

## 2023-10-29 PROBLEM — M06.9 RHEUMATOID ARTHRITIS (H): Status: ACTIVE | Noted: 2023-10-29

## 2023-10-29 PROBLEM — I48.91 ATRIAL FIBRILLATION (H): Status: ACTIVE | Noted: 2023-10-29

## 2023-10-29 PROBLEM — I97.89 POSTOPERATIVE ATRIAL FIBRILLATION (H): Status: ACTIVE | Noted: 2023-09-22

## 2023-10-29 PROBLEM — I48.91 POSTOPERATIVE ATRIAL FIBRILLATION (H): Status: ACTIVE | Noted: 2023-09-22

## 2023-10-29 PROBLEM — Z87.891 PERSONAL HISTORY OF TOBACCO USE, PRESENTING HAZARDS TO HEALTH: Status: ACTIVE | Noted: 2023-09-18

## 2023-10-29 PROBLEM — Q21.12 PATENT FORAMEN OVALE: Status: ACTIVE | Noted: 2023-09-18

## 2023-10-29 PROBLEM — Z98.890 HISTORY OF RIGHT COMMON CAROTID ARTERY STENT PLACEMENT: Status: ACTIVE | Noted: 2023-05-02

## 2023-10-29 PROBLEM — G47.33 OBSTRUCTIVE SLEEP APNEA SYNDROME IN ADULT: Status: ACTIVE | Noted: 2023-09-18

## 2023-10-29 RX ORDER — POTASSIUM CHLORIDE 1500 MG/1
20 TABLET, EXTENDED RELEASE ORAL PRN
COMMUNITY
Start: 2023-09-27 | End: 2023-12-14

## 2023-10-30 ENCOUNTER — HOSPITAL ENCOUNTER (OUTPATIENT)
Dept: CARDIAC REHAB | Facility: OTHER | Age: 68
Discharge: HOME | End: 2023-10-30
Attending: STUDENT IN AN ORGANIZED HEALTH CARE EDUCATION/TRAINING PROGRAM
Payer: MEDICARE

## 2023-10-30 PROCEDURE — 93798 PHYS/QHP OP CAR RHAB W/ECG: CPT

## 2023-11-01 ENCOUNTER — HOSPITAL ENCOUNTER (OUTPATIENT)
Dept: CARDIAC REHAB | Facility: OTHER | Age: 68
Discharge: HOME | End: 2023-11-01
Attending: PHYSICIAN ASSISTANT
Payer: MEDICARE

## 2023-11-01 ENCOUNTER — OFFICE VISIT (OUTPATIENT)
Dept: FAMILY MEDICINE | Facility: OTHER | Age: 68
End: 2023-11-01
Attending: PHYSICIAN ASSISTANT
Payer: MEDICARE

## 2023-11-01 VITALS
TEMPERATURE: 97 F | HEIGHT: 71 IN | DIASTOLIC BLOOD PRESSURE: 82 MMHG | RESPIRATION RATE: 16 BRPM | WEIGHT: 230.2 LBS | HEART RATE: 83 BPM | SYSTOLIC BLOOD PRESSURE: 124 MMHG | OXYGEN SATURATION: 95 % | BODY MASS INDEX: 32.23 KG/M2

## 2023-11-01 DIAGNOSIS — Z23 NEED FOR TETANUS BOOSTER: ICD-10-CM

## 2023-11-01 DIAGNOSIS — D69.6 DISORDER INVOLVING THROMBOCYTOPENIA (H): ICD-10-CM

## 2023-11-01 DIAGNOSIS — Z29.11 NEED FOR VACCINATION AGAINST RESPIRATORY SYNCYTIAL VIRUS: ICD-10-CM

## 2023-11-01 DIAGNOSIS — I10 ESSENTIAL HYPERTENSION: ICD-10-CM

## 2023-11-01 DIAGNOSIS — Z79.2 PROPHYLACTIC ANTIBIOTIC: ICD-10-CM

## 2023-11-01 DIAGNOSIS — E66.09 CLASS 1 OBESITY DUE TO EXCESS CALORIES WITH SERIOUS COMORBIDITY AND BODY MASS INDEX (BMI) OF 32.0 TO 32.9 IN ADULT: ICD-10-CM

## 2023-11-01 DIAGNOSIS — Z23 NEED FOR COVID-19 VACCINE: ICD-10-CM

## 2023-11-01 DIAGNOSIS — M05.79 RHEUMATOID ARTHRITIS INVOLVING MULTIPLE SITES WITH POSITIVE RHEUMATOID FACTOR (H): ICD-10-CM

## 2023-11-01 DIAGNOSIS — Z29.11 NEED FOR RSV IMMUNIZATION: ICD-10-CM

## 2023-11-01 DIAGNOSIS — E78.5 HYPERLIPIDEMIA LDL GOAL <70: ICD-10-CM

## 2023-11-01 DIAGNOSIS — I25.810 CORONARY ARTERY DISEASE INVOLVING AUTOLOGOUS ARTERY CORONARY BYPASS GRAFT WITHOUT ANGINA PECTORIS: ICD-10-CM

## 2023-11-01 DIAGNOSIS — Z23 NEED FOR TDAP VACCINATION: ICD-10-CM

## 2023-11-01 DIAGNOSIS — Z23 NEEDS FLU SHOT: ICD-10-CM

## 2023-11-01 DIAGNOSIS — E66.811 CLASS 1 OBESITY DUE TO EXCESS CALORIES WITH SERIOUS COMORBIDITY AND BODY MASS INDEX (BMI) OF 32.0 TO 32.9 IN ADULT: ICD-10-CM

## 2023-11-01 DIAGNOSIS — I48.91 POSTOPERATIVE ATRIAL FIBRILLATION (H): Primary | ICD-10-CM

## 2023-11-01 DIAGNOSIS — I97.89 POSTOPERATIVE ATRIAL FIBRILLATION (H): Primary | ICD-10-CM

## 2023-11-01 PROCEDURE — G0008 ADMIN INFLUENZA VIRUS VAC: HCPCS

## 2023-11-01 PROCEDURE — G0463 HOSPITAL OUTPT CLINIC VISIT: HCPCS | Mod: 25

## 2023-11-01 PROCEDURE — 99214 OFFICE O/P EST MOD 30 MIN: CPT | Performed by: PHYSICIAN ASSISTANT

## 2023-11-01 PROCEDURE — 93798 PHYS/QHP OP CAR RHAB W/ECG: CPT

## 2023-11-01 PROCEDURE — 91320 SARSCV2 VAC 30MCG TRS-SUC IM: CPT

## 2023-11-01 RX ORDER — RESPIRATORY SYNCYTIAL VIRUS VACCINE 120MCG/0.5
0.5 KIT INTRAMUSCULAR ONCE
Qty: 1 EACH | Refills: 0 | Status: SHIPPED | OUTPATIENT
Start: 2023-11-01 | End: 2023-11-01

## 2023-11-01 ASSESSMENT — PAIN SCALES - GENERAL: PAINLEVEL: MILD PAIN (2)

## 2023-11-01 NOTE — PROGRESS NOTES
Assessment & Plan       ICD-10-CM    1. Postoperative atrial fibrillation (H)  I97.89     I48.91       2. Essential hypertension  I10       3. Coronary artery disease involving autologous artery coronary bypass graft without angina pectoris  I25.810       4. Hyperlipidemia LDL goal <70  E78.5       5. Disorder involving thrombocytopenia (H24)  D69.6       6. Rheumatoid arthritis involving multiple sites with positive rheumatoid factor (H)  M05.79       7. Class 1 obesity due to excess calories with serious comorbidity and body mass index (BMI) of 32.0 to 32.9 in adult  E66.09     Z68.32       8. Prophylactic antibiotic  Z79.2 amoxicillin-clavulanate (AUGMENTIN) 875-125 MG tablet      9. Need for COVID-19 vaccine  Z23 COVID-19 12+ (2023-24) (PFIZER)      10. Needs flu shot  Z23 INFLUENZA VACCINE 65+ (FLUZONE HD)      11. Need for vaccination against respiratory syncytial virus  Z29.11 respiratory syncytial virus vaccine, bivalent (ABRYSVO) injection      12. Need for Tdap vaccination  Z23 Tdap, tetanus-diptheria-acell pertussis, (BOOSTRIX) 5-2.5-18.5 LF-MCG/0.5 BLAINE injection      13. Need for RSV immunization  Z29.11       14. Need for tetanus booster  Z23         Stable - continue life time aspirin and Plavix 75 mg for 3 months postoperatively. OK to discontinue Colchicine and Amiodarone once tapers/courses are completed, this was reviewed with cardiology as well. Continue cardiac rehab. HAS-BLED risk score 2.   Pressures are well controlled at 124/82 today, averaging 118-122 at home systolic and 70-80 diastolic. Change Toprol XL to PM dosing, continue Lisinopril AM dosing.   Known stent in place, 2.5 x 38 mm promus-drug eluding stent to proximal circumflex 2012. See #1.   Continue Rosuvastatin 40 mg at night time and heart healthy diet.   Stable, continue follow up with vascular, cardiology and primary care.   Rheumatoid factor positive in March 2023, strong familial history. Continue close follow up.   OBESITY  - Ongoing.  (See Encounter Diagnosis list above for obesity class / severity). Counseled on diet and exercise. Weight loss would improve Hypertension, Cholesterol and Heart Disease. Thus far, excellent work on lifestyle modifications - is down 9 lb since 10/4/23. Continue with heart healthy diet and exercise regimen (cardiac rehab).   Mild erythema to proximal sternotomy site. Non tender, no drainage. Will prescribe Augmentin - 1 tablet by mouth twice daily for 7 days for cellulitis prophylaxis. Discussed risks, benefits and side effects of medication at length with patient.   Due for COVID vaccine. No current or recent illness symptoms. No previous reactions. Administered by nursing.   Due for Influenza vaccine. No current or recent illness symptoms. No previous reactions. Administered by nursing.   Due for RSV immunization - due to Medicare restrictions, this will need to be completed at pharmacy. Order sent to pharmacy for pharmacist administration, recommend to complete in 1-3 weeks.   Due for tetanus booster - due to Medicare restrictions, this will need to be completed at pharmacy. Order sent to pharmacy for pharmacist administration, recommend to complete in 1-3 weeks.      See Patient Instructions    Return in about 2 months (around 1/5/2024) for Physical.    Erica Hackett PA-C  [facility] [place]    Ivelisse Bradley is a 68 year old, presenting for the following health issues:  Recheck Medication        11/1/2023     8:51 AM   Additional Questions   Roomed by Angie LARSON   Accompanied by Spouse     History of Present Illness       Hyperlipidemia:  He presents for follow up of hyperlipidemia.   He is taking medication to lower cholesterol. He is not having myalgia or other side effects to statin medications.    Hypertension: He presents for follow up of hypertension.  He does check blood pressure  regularly outside of the clinic. Outpatient blood pressures have not been over 140/90. He follows a  "low salt diet.     Vascular Disease:  He presents for follow up of vascular disease.      He takes daily aspirin.    He eats 4 or more servings of fruits and vegetables daily.He consumes 1 sweetened beverage(s) daily.He exercises with enough effort to increase his heart rate 20 to 29 minutes per day.  He exercises with enough effort to increase his heart rate 3 or less days per week.   He is taking medications regularly.     Medication Followup of pacerone & colchicine   Taking Medication as prescribed: yes  Side Effects:  None  Medication Helping Symptoms:  yes    Review of Systems   Constitutional, HEENT, cardiovascular, pulmonary, GI, , musculoskeletal, neuro, skin, endocrine and psych systems are negative, except as otherwise noted.        Objective    /82 (BP Location: Left arm, Patient Position: Sitting, Cuff Size: Adult Regular)   Pulse 83   Temp 97  F (36.1  C) (Temporal)   Resp 16   Ht 1.803 m (5' 11\")   Wt 104.4 kg (230 lb 3.2 oz)   SpO2 95%   BMI 32.11 kg/m    Body mass index is 32.11 kg/m .  Physical Exam   GENERAL: healthy, alert and no distress  EYES: Eyes grossly normal to inspection, PERRL and conjunctivae and sclerae normal  HENT: ear canals and TM's normal, nose and mouth without ulcers or lesions  NECK: no adenopathy, no asymmetry, masses, or scars and thyroid normal to palpation  RESP: lungs clear to auscultation - no rales, rhonchi or wheezes  CV: regular rate and rhythm, normal S1 S2, no S3 or S4, no murmur, click or rub, no peripheral edema and peripheral pulses strong  ABDOMEN: soft, nontender, no hepatosplenomegaly, no masses and bowel sounds normal  SKIN: mild erythema to proximal 1/4 of sternotomy site, non tender. Very mild calor, no fluctuance or drainage.   PSYCH: mentation appears normal, affect normal/bright    No results found for any visits on 11/01/23.      "

## 2023-11-01 NOTE — PATIENT INSTRUCTIONS
You were prescribed an antibiotic, please take into consideration the following information:  - Take entire course of antibiotic even if you start to feel better.  - Antibiotics can cause stomach upset including nausea and diarrhea. Read your bottle or ask the pharmacist if antibiotic can be taken with food to help prevent nausea. If you have symptoms of diarrhea you can take an over-the-counter probiotic and/or increase foods with probiotics such as yogurt, Eduard, sauerkraut.  -Use caution in sunlight as can lead to increased risk of sunburn while on ABX (antibiotics).

## 2023-11-01 NOTE — NURSING NOTE
"Chief Complaint   Patient presents with    Recheck Medication       Initial /82 (BP Location: Left arm, Patient Position: Sitting, Cuff Size: Adult Regular)   Pulse 83   Temp 97  F (36.1  C) (Temporal)   Resp 16   Ht 1.803 m (5' 11\")   Wt 104.4 kg (230 lb 3.2 oz)   SpO2 95%   BMI 32.11 kg/m   Estimated body mass index is 32.11 kg/m  as calculated from the following:    Height as of this encounter: 1.803 m (5' 11\").    Weight as of this encounter: 104.4 kg (230 lb 3.2 oz).  Medication Review: complete    The next two questions are to help us understand your food security.  If you are feeling you need any assistance in this area, we have resources available to support you today.          10/1/2023   SDOH- Food Insecurity   Within the past 12 months, did you worry that your food would run out before you got money to buy more? N   Within the past 12 months, did the food you bought just not last and you didn t have money to get more? N         Health Care Directive:  Patient has a Health Care Directive on file      Angie JOVON Pelletier      "

## 2023-11-04 PROBLEM — I25.810 CORONARY ARTERY DISEASE INVOLVING AUTOLOGOUS ARTERY CORONARY BYPASS GRAFT WITHOUT ANGINA PECTORIS: Status: ACTIVE | Noted: 2023-11-04

## 2023-11-04 PROBLEM — I10 ESSENTIAL HYPERTENSION: Status: ACTIVE | Noted: 2023-11-04

## 2023-11-04 PROBLEM — E66.01 MORBID OBESITY (H): Status: RESOLVED | Noted: 2022-12-14 | Resolved: 2023-11-04

## 2023-11-14 ENCOUNTER — E-VISIT (OUTPATIENT)
Dept: SURGERY | Facility: OTHER | Age: 68
End: 2023-11-14
Payer: COMMERCIAL

## 2023-11-14 DIAGNOSIS — U07.1 INFECTION DUE TO 2019 NOVEL CORONAVIRUS: Primary | ICD-10-CM

## 2023-11-14 PROCEDURE — 99421 OL DIG E/M SVC 5-10 MIN: CPT | Performed by: PHYSICIAN ASSISTANT

## 2023-11-14 NOTE — PATIENT INSTRUCTIONS
Thank you for choosing us for your care. I have placed an order for a prescription so that you can start treatment. View your full visit summary for details by clicking on the link below. Your pharmacist will able to address any questions you may have about the medication.     If you're not feeling better within 5-7 days, please schedule an appointment.  You can schedule an appointment right here in [facility], or call [phone]  If the visit is for the same symptoms as your eVisit, we'll refund the cost of your eVisit if seen within seven days.

## 2023-11-20 ENCOUNTER — HOSPITAL ENCOUNTER (OUTPATIENT)
Dept: CARDIAC REHAB | Facility: OTHER | Age: 68
Discharge: HOME | End: 2023-11-20
Attending: STUDENT IN AN ORGANIZED HEALTH CARE EDUCATION/TRAINING PROGRAM
Payer: MEDICARE

## 2023-11-20 PROCEDURE — 93798 PHYS/QHP OP CAR RHAB W/ECG: CPT

## 2023-11-22 ENCOUNTER — HOSPITAL ENCOUNTER (OUTPATIENT)
Dept: CARDIAC REHAB | Facility: OTHER | Age: 68
Discharge: HOME | End: 2023-11-22
Attending: STUDENT IN AN ORGANIZED HEALTH CARE EDUCATION/TRAINING PROGRAM
Payer: MEDICARE

## 2023-11-22 PROCEDURE — 93798 PHYS/QHP OP CAR RHAB W/ECG: CPT

## 2023-11-27 ENCOUNTER — HOSPITAL ENCOUNTER (OUTPATIENT)
Dept: CARDIAC REHAB | Facility: OTHER | Age: 68
Discharge: HOME | End: 2023-11-27
Attending: STUDENT IN AN ORGANIZED HEALTH CARE EDUCATION/TRAINING PROGRAM
Payer: MEDICARE

## 2023-11-27 PROCEDURE — 93798 PHYS/QHP OP CAR RHAB W/ECG: CPT

## 2023-11-29 ENCOUNTER — HOSPITAL ENCOUNTER (OUTPATIENT)
Dept: CARDIAC REHAB | Facility: OTHER | Age: 68
Discharge: HOME | End: 2023-11-29
Attending: STUDENT IN AN ORGANIZED HEALTH CARE EDUCATION/TRAINING PROGRAM
Payer: MEDICARE

## 2023-11-29 PROCEDURE — 93798 PHYS/QHP OP CAR RHAB W/ECG: CPT

## 2023-12-01 ENCOUNTER — HOSPITAL ENCOUNTER (OUTPATIENT)
Dept: CARDIAC REHAB | Facility: OTHER | Age: 68
Discharge: HOME | End: 2023-12-01
Attending: STUDENT IN AN ORGANIZED HEALTH CARE EDUCATION/TRAINING PROGRAM
Payer: MEDICARE

## 2023-12-01 PROCEDURE — 93798 PHYS/QHP OP CAR RHAB W/ECG: CPT

## 2023-12-04 ENCOUNTER — HOSPITAL ENCOUNTER (OUTPATIENT)
Dept: CARDIAC REHAB | Facility: OTHER | Age: 68
Discharge: HOME | End: 2023-12-04
Attending: STUDENT IN AN ORGANIZED HEALTH CARE EDUCATION/TRAINING PROGRAM
Payer: MEDICARE

## 2023-12-04 PROCEDURE — 93798 PHYS/QHP OP CAR RHAB W/ECG: CPT

## 2023-12-06 ENCOUNTER — HOSPITAL ENCOUNTER (OUTPATIENT)
Dept: CARDIAC REHAB | Facility: OTHER | Age: 68
Discharge: HOME | End: 2023-12-06
Attending: STUDENT IN AN ORGANIZED HEALTH CARE EDUCATION/TRAINING PROGRAM
Payer: MEDICARE

## 2023-12-06 PROCEDURE — 93798 PHYS/QHP OP CAR RHAB W/ECG: CPT

## 2023-12-06 PROCEDURE — 93797 PHYS/QHP OP CAR RHAB WO ECG: CPT | Mod: XU

## 2023-12-08 ENCOUNTER — HOSPITAL ENCOUNTER (OUTPATIENT)
Dept: CARDIAC REHAB | Facility: OTHER | Age: 68
Discharge: HOME | End: 2023-12-08
Attending: STUDENT IN AN ORGANIZED HEALTH CARE EDUCATION/TRAINING PROGRAM
Payer: MEDICARE

## 2023-12-08 PROCEDURE — 93798 PHYS/QHP OP CAR RHAB W/ECG: CPT

## 2023-12-11 ENCOUNTER — HOSPITAL ENCOUNTER (OUTPATIENT)
Dept: CARDIAC REHAB | Facility: OTHER | Age: 68
Discharge: HOME | End: 2023-12-11
Attending: STUDENT IN AN ORGANIZED HEALTH CARE EDUCATION/TRAINING PROGRAM
Payer: MEDICARE

## 2023-12-11 PROCEDURE — 93798 PHYS/QHP OP CAR RHAB W/ECG: CPT

## 2023-12-13 ENCOUNTER — HOSPITAL ENCOUNTER (OUTPATIENT)
Dept: CARDIAC REHAB | Facility: OTHER | Age: 68
Discharge: HOME | End: 2023-12-13
Attending: STUDENT IN AN ORGANIZED HEALTH CARE EDUCATION/TRAINING PROGRAM
Payer: MEDICARE

## 2023-12-13 PROCEDURE — 93798 PHYS/QHP OP CAR RHAB W/ECG: CPT

## 2023-12-14 ENCOUNTER — OFFICE VISIT (OUTPATIENT)
Dept: CARDIOLOGY | Facility: OTHER | Age: 68
End: 2023-12-14
Attending: NURSE PRACTITIONER
Payer: MEDICARE

## 2023-12-14 ENCOUNTER — HOSPITAL ENCOUNTER (OUTPATIENT)
Dept: ULTRASOUND IMAGING | Facility: OTHER | Age: 68
Discharge: HOME | End: 2023-12-14
Attending: NURSE PRACTITIONER
Payer: MEDICARE

## 2023-12-14 VITALS
HEART RATE: 64 BPM | TEMPERATURE: 97.5 F | OXYGEN SATURATION: 98 % | WEIGHT: 228.2 LBS | RESPIRATION RATE: 12 BRPM | BODY MASS INDEX: 31.95 KG/M2 | HEIGHT: 71 IN | DIASTOLIC BLOOD PRESSURE: 70 MMHG | SYSTOLIC BLOOD PRESSURE: 118 MMHG

## 2023-12-14 DIAGNOSIS — R60.0 LEG EDEMA, LEFT: ICD-10-CM

## 2023-12-14 DIAGNOSIS — R79.89 ELEVATED D-DIMER: ICD-10-CM

## 2023-12-14 DIAGNOSIS — Z98.890 HISTORY OF RIGHT COMMON CAROTID ARTERY STENT PLACEMENT: ICD-10-CM

## 2023-12-14 DIAGNOSIS — Z95.1 S/P CABG X 3: Primary | ICD-10-CM

## 2023-12-14 DIAGNOSIS — Z95.1 S/P CABG X 3: ICD-10-CM

## 2023-12-14 DIAGNOSIS — Z95.828 HISTORY OF RIGHT COMMON CAROTID ARTERY STENT PLACEMENT: ICD-10-CM

## 2023-12-14 DIAGNOSIS — Z87.74 S/P PATENT FORAMEN OVALE CLOSURE: ICD-10-CM

## 2023-12-14 DIAGNOSIS — Z95.5 HISTORY OF CORONARY ARTERY STENT PLACEMENT: ICD-10-CM

## 2023-12-14 DIAGNOSIS — I48.91 POSTOPERATIVE ATRIAL FIBRILLATION (H): ICD-10-CM

## 2023-12-14 DIAGNOSIS — I97.89 POSTOPERATIVE ATRIAL FIBRILLATION (H): ICD-10-CM

## 2023-12-14 DIAGNOSIS — I31.8 RESTRICTIVE PERICARDITIS: ICD-10-CM

## 2023-12-14 DIAGNOSIS — R73.03 PREDIABETES: ICD-10-CM

## 2023-12-14 DIAGNOSIS — E78.5 HYPERLIPIDEMIA LDL GOAL <70: ICD-10-CM

## 2023-12-14 LAB
ALBUMIN SERPL BCG-MCNC: 4.4 G/DL (ref 3.5–5.2)
ALP SERPL-CCNC: 78 U/L (ref 40–150)
ALT SERPL W P-5'-P-CCNC: 22 U/L (ref 0–70)
ANION GAP SERPL CALCULATED.3IONS-SCNC: 9 MMOL/L (ref 7–15)
AST SERPL W P-5'-P-CCNC: 25 U/L (ref 0–45)
BILIRUB SERPL-MCNC: 0.5 MG/DL
BUN SERPL-MCNC: 22.7 MG/DL (ref 8–23)
CALCIUM SERPL-MCNC: 9.9 MG/DL (ref 8.8–10.2)
CHLORIDE SERPL-SCNC: 101 MMOL/L (ref 98–107)
CREAT SERPL-MCNC: 0.88 MG/DL (ref 0.67–1.17)
D DIMER PPP FEU-MCNC: 1.72 UG/ML FEU (ref 0–0.5)
DEPRECATED HCO3 PLAS-SCNC: 27 MMOL/L (ref 22–29)
EGFRCR SERPLBLD CKD-EPI 2021: >90 ML/MIN/1.73M2
ERYTHROCYTE [DISTWIDTH] IN BLOOD BY AUTOMATED COUNT: 15.1 % (ref 10–15)
GLUCOSE SERPL-MCNC: 108 MG/DL (ref 70–99)
HBA1C MFR BLD: 5.5 % (ref 4–6.2)
HCT VFR BLD AUTO: 40.4 % (ref 40–53)
HGB BLD-MCNC: 13.3 G/DL (ref 13.3–17.7)
MCH RBC QN AUTO: 30.6 PG (ref 26.5–33)
MCHC RBC AUTO-ENTMCNC: 32.9 G/DL (ref 31.5–36.5)
MCV RBC AUTO: 93 FL (ref 78–100)
NT-PROBNP SERPL-MCNC: 176 PG/ML (ref 0–900)
PLATELET # BLD AUTO: 166 10E3/UL (ref 150–450)
POTASSIUM SERPL-SCNC: 4.6 MMOL/L (ref 3.4–5.3)
PROT SERPL-MCNC: 7.2 G/DL (ref 6.4–8.3)
RBC # BLD AUTO: 4.35 10E6/UL (ref 4.4–5.9)
SODIUM SERPL-SCNC: 137 MMOL/L (ref 135–145)
WBC # BLD AUTO: 4.7 10E3/UL (ref 4–11)

## 2023-12-14 PROCEDURE — 83036 HEMOGLOBIN GLYCOSYLATED A1C: CPT | Mod: ZL | Performed by: NURSE PRACTITIONER

## 2023-12-14 PROCEDURE — G0463 HOSPITAL OUTPT CLINIC VISIT: HCPCS

## 2023-12-14 PROCEDURE — 93971 EXTREMITY STUDY: CPT | Mod: LT

## 2023-12-14 PROCEDURE — 85027 COMPLETE CBC AUTOMATED: CPT | Mod: ZL | Performed by: NURSE PRACTITIONER

## 2023-12-14 PROCEDURE — 85379 FIBRIN DEGRADATION QUANT: CPT | Mod: ZL | Performed by: NURSE PRACTITIONER

## 2023-12-14 PROCEDURE — 36415 COLL VENOUS BLD VENIPUNCTURE: CPT | Mod: ZL | Performed by: NURSE PRACTITIONER

## 2023-12-14 PROCEDURE — 99215 OFFICE O/P EST HI 40 MIN: CPT | Performed by: NURSE PRACTITIONER

## 2023-12-14 PROCEDURE — 80053 COMPREHEN METABOLIC PANEL: CPT | Mod: ZL | Performed by: NURSE PRACTITIONER

## 2023-12-14 PROCEDURE — 83880 ASSAY OF NATRIURETIC PEPTIDE: CPT | Mod: ZL,GZ | Performed by: NURSE PRACTITIONER

## 2023-12-14 ASSESSMENT — PAIN SCALES - GENERAL: PAINLEVEL: NO PAIN (0)

## 2023-12-14 NOTE — NURSING NOTE
"Chief Complaint   Patient presents with    Follow Up     12 week post op triple by-pass at [facility]        Initial /70 (BP Location: Right arm, Patient Position: Sitting, Cuff Size: Adult Large)   Pulse 64   Temp 97.5  F (36.4  C) (Temporal)   Resp 12   Ht 1.803 m (5' 11\")   Wt 103.5 kg (228 lb 3.2 oz)   SpO2 98%   BMI 31.83 kg/m   Estimated body mass index is 31.83 kg/m  as calculated from the following:    Height as of this encounter: 1.803 m (5' 11\").    Weight as of this encounter: 103.5 kg (228 lb 3.2 oz).  Meds Reconciled: complete  Pt is on Aspirin  Pt is on a Statin  Pt is not on Xarelto or Eliquis  Pt is not on a Warfarin   PHQ and/or BRANDO reviewed. Pt referred to PCP/MH Provider as appropriate.    Michelle Zepeda LPN         "

## 2023-12-14 NOTE — PATIENT INSTRUCTIONS
You no longer need to be on Plavix, complete therapy 3 months post-op CABG.   Labs today, we will notify you of results. Based on lab results we will notify you if you will need to return for US of your left leg.  Sternal precautions lifted, you may sleep in bed now.   No dental work or cleanings for 6 months post op.  Arrive fasting to your visit with Erica on January 4th.   You may return to therapy for your left shoulder, build up gradually.  Discuss possible need for sleep study referral without Erica at your next visit.

## 2023-12-14 NOTE — PROGRESS NOTES
[facility] HEART CARE   CARDIOLOGY PROGRESS NOTE    Kirk John   [address] [address] [id number]    Erica Hackett     HPI:   Mrs. John is 68 year old male who presents for cardiology follow-up to visit on 10/11/2023.  Patient recently established cardiology care s/p 3V CABG with PFO closure on 9/22/2023 at [facility]. Patient has a history of HLD, CAD with history of prior coronary stent placement in 2012, prediabetes, diverticulosis and remote history of tobacco use.     Patient has history of TIA, symptoms included slurred speech, left hemiparesis and left facial droop.  Symptoms lasted approximately 20 to 30 minutes with complete resolution.  CT of the head was unremarkable for any acute intracranial findings, CTA demonstrated high-grade stenosis of the right ICA.  He underwent successful stent placement to the right ICA on 5/1/2023.    He has known CAD as listed above. S/p PCI resulting in 2.5 x 38 mm Promus drug-eluting stent to the pLCX in 2012.      TTE (5/1/2023) demonstrated normal LV chamber size, and normal regionality, with LVEF 57%, grade 1 diastolic dysfunction, normal RV chamber size and systolic function, no hemodynamically significant valve disease, and a small right-to-left atrial shunt which increases to severe with Valsalva release.    S/p Coronary Angiography on 6/16/2023 demonstrating three-vessel CAD with severe pLAD stenosis.  Circumflex consists of large ramus branch with prior stenting revealing discrete in-stent restenosis-moderate in severity.  The RCA was proximally occluded with right to right and left to right collaterals.      S/p 3V CABG (LIMA-LAD, rSVG-OM/RCA) with PFO closure by Dr. Roger at [facility] on 9/22/2023. He was discharged on Amiodarone taper in setting of post-op AF. Post op echo revealing restrictive pericarditis, treated with colchicine for 6 weeks, no NSAID's. Repeat TTE recommended one month post-op.    Repeat TTE 10/23/2023-hyperdynamic LV  systolic function, LVEF greater than 70%, mild LVH, normal LV diastolic function.  Normal RV size and function.  Both atria appear normal.  No hemodynamically significant valvular abnormalities.  No pulmonary hypertension, RVSP 24.4 mmHg plus RAP.  Pericardial effusion resolved.    INTERVAL HISTORY:  Today, patient reports feeling good. He is 12 weeks post op. Pain and sternotomy site resolved, only some muscular discomfort at times. He is doing well at cardiac rehab. No palpations or recurrence of AF symptoms since off Amiodarone. No increased dyspnea at rest or with exertion. No orthopnea or PND. Positive for persisting edema in left LE (site of SVG). No chest pain or pressure. No lightheadedness or syncope. Patient was prescribed Amoxicillin in early November for concern related to superficial skin infection at sternotomy site which resolved with antibiotics.        PAST MEDICAL HISTORY:   Past Medical History:   Diagnosis Date    Abnormal result of other cardiovascular function study (CODE)     No Comments Provided    Atherosclerotic heart disease of native coronary artery without angina pectoris     No Comments Provided    Chest pain     No Comments Provided    Essential (primary) hypertension     No Comments Provided    Hyperlipidemia     No Comments Provided    Morbid obesity (H)     Osteoarthritis     No Comments Provided          FAMILY HISTORY:   Family History   Problem Relation Age of Onset    Cancer Father         Cancer,Lung    Peripheral Vascular Disease Father     Heart Disease Mother 70        Heart Disease,CAD/MI    Pacemaker Sister     Heart Disease Maternal Grandfather 60        Heart Disease,CAD    Heart Disease Maternal Uncle 60        Heart Disease,CAD    No Known Problems Sister     Diabetes No family hx of     Prostate Cancer No family hx of     Colon Cancer No family hx of           PAST SURGICAL HISTORY:   Past Surgical History:   Procedure Laterality Date    ARTHROPLASTY HIP       2007,Left total hip with Dr Graham Escalante    COLONOSCOPY      6/3/05,Colonoscopy, normal.  F/u in 10 years    COLONOSCOPY  2016    Normal exam,  F/U     CV CARDIAC CATHERIZATION  2012    proximal circumflex, [facility]    RELEASE CARPAL TUNNEL      ,Right carpal tunnel sugery    TONSILLECTOMY, ADENOIDECTOMY, COMBINED      T&A as a child          SOCIAL HISTORY:   Social History     Socioeconomic History    Marital status:      Spouse name: None    Number of children: None    Years of education: None    Highest education level: None   Tobacco Use    Smoking status: Former     Types: Cigarettes     Quit date: 10/11/1984     Years since quittin.0     Passive exposure: Never    Smokeless tobacco: Never    Tobacco comments:     Last smoked over 40 years ago-early s   Vaping Use    Vaping Use: Never used   Substance and Sexual Activity    Alcohol use: Yes     Comment: 2 a week    Drug use: Never    Sexual activity: Yes     Partners: Female   Social History Narrative    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  . 3 daughters and 4 grandkids.  Likes to hunt and fish.  Plays pickleball.     Social Determinants of Health     Financial Resource Strain: Low Risk  (10/1/2023)    Financial Resource Strain     Within the past 12 months, have you or your family members you live with been unable to get utilities (heat, electricity) when it was really needed?: No   Food Insecurity: Low Risk  (10/1/2023)    Food Insecurity     Within the past 12 months, did you worry that your food would run out before you got money to buy more?: No     Within the past 12 months, did the food you bought just not last and you didn t have money to get more?: No   Transportation Needs: Low Risk  (10/1/2023)    Transportation Needs     Within the past 12 months, has lack of transportation kept you from medical appointments, getting your medicines, non-medical meetings or appointments, work, or from getting  things that you need?: No   Housing Stability: Low Risk  (10/1/2023)    Housing Stability     Do you have housing? : Yes     Are you worried about losing your housing?: No          CURRENT MEDICATIONS:   Current Outpatient Medications   Medication    acetaminophen (TYLENOL) 500 MG tablet    aspirin 81 MG EC tablet    blood glucose (NO BRAND SPECIFIED) test strip    blood glucose monitoring (ACCU-CHEK ENEDINA PLUS) meter device kit    blood glucose monitoring (ACCU-CHEK MULTICLIX) lancets    cholecalciferol (VITAMIN D3) 125 mcg (5000 units) capsule    furosemide (LASIX) 20 MG tablet    lisinopril (ZESTRIL) 2.5 MG tablet    metoprolol succinate ER (TOPROL XL) 25 MG 24 hr tablet    Multiple Vitamins-Minerals (MULTIVITAMIN & MINERAL PO)    nitroGLYcerin (NITROSTAT) 0.4 MG sublingual tablet    potassium chloride ER (KLOR-CON M) 10 MEQ CR tablet    rosuvastatin (CRESTOR) 40 MG tablet     No current facility-administered medications for this visit.         ALLERGIES:   Allergies   Allergen Reactions    Poison Ivy Extract Other (See Comments)     Hospitalized as a child, whole body reaction including the throat.          ROS:   CONSTITUTIONAL: No reported fever or chills.  Weight stable.  ENT: No visual disturbance, ear ache, epistaxis or sore throat.   CARDIOVASCULAR: No chest pain, chest pressure or chest discomfort. No palpitations. Positive for LE edema on left.  RESPIRATORY: No shortness of breath, cough, wheezing or hemoptysis.  No orthopnea or PND.  GI: No reported abdominal pain, melena or hematochezia.  : No reported hematuria or dysuria.   NEUROLOGICAL: No lightheadedness, dizziness, syncope, ataxia, paresthesias or weakness.   HEMATOLOGIC: No history of anemia. No bleeding or excessive bruising. No history of blood clots.   MUSCULOSKELETAL: No new joint pain or swelling, no muscle pain.  ENDOCRINOLOGIC: No temperature intolerance. No hair or skin changes. Chronic left shoulder pain.   SKIN: No abnormal rashes or  "sores, no unusual itching.   PSYCHIATRIC: No history of depression or anxiety. No changes in mood, feeling down or anxious. No changes in sleep.      PHYSICAL EXAM:   /70 (BP Location: Right arm, Patient Position: Sitting, Cuff Size: Adult Large)   Pulse 64   Temp 97.5  F (36.4  C) (Temporal)   Resp 12   Ht 1.803 m (5' 11\")   Wt 103.5 kg (228 lb 3.2 oz)   SpO2 98%   BMI 31.83 kg/m    GENERAL: The patient is a well-developed, well-nourished, in no apparent distress.  HEENT: Head is normocephalic and atraumatic. Eyes are symmetrical with normal visual tracking. No icterus, no xanthelasmas. Nares appeared normal without nasal drainage. Mucous membranes are moist, no cyanosis.  NECK: Supple, no cervical bruits, JVP not visible.   CHEST/ LUNGS: Lungs clear to auscultation, no rales, rhonchi or wheezes, no use of accessory muscles, no retractions, respirations unlabored and normal respiratory rate.   CARDIO: Regular rate and rhythm normal with S1 and S2, no S3 or S4 and no murmur, click or rub. Precordium quiet with normal PMI.  Sternotomy site healed well, normal exam.   ABD: Abdomen is nondistended.   EXTREMITIES: Positive for 1+ pitting edema on left, tenderness over calf. No edema on right LE.   MUSCULOSKELETAL: No visible joint swelling.   NEUROLOGIC: Alert and oriented X3. Normal speech, gait and affect. No focal neurologic deficits.   SKIN: No jaundice. No rashes or visible skin lesions present. No ecchymosis. Sternotomy site healed well, drain sites well approximated without erythema or drainage. Left leg incisions healed well without drainage or erythema.     LAB RESULTS:   Hospital Outpatient Visit on 10/23/2023   Component Date Value Ref Range Status    LVEF  10/23/2023 70%   Final         ASSESSMENT:   Kirk John presents for cardiology follow-up to visit on 10/11/2023.  Patient recently established cardiology care s/p 3V CABG with PFO closure on 9/22/2023 at [facility]. Patient has a history " of HLD, CAD with history of prior coronary stent placement in 2012, prediabetes, diverticulosis and remote history of tobacco use.   Today, patient reports feeling good. He is 12 weeks post op. Pain and sternotomy site resolved, only some muscular discomfort at times. He is doing well at cardiac rehab. No palpations or recurrence of AF symptoms since off Amiodarone. No increased dyspnea at rest or with exertion. No orthopnea or PND. Positive for persisting edema in left LE (site of SVG). No chest pain or pressure. No lightheadedness or syncope. Patient was prescribed Amoxicillin in early November for concern related to superficial skin infection at sternotomy site which resolved with antibiotics.      PLAN:  1. S/P CABG x 3 (9/22/2023)  S/p 3V CABG (LIMA-LAD, rSVG-OM/RCA) with PFO closure by Dr. Roger at [place] on 9/22/2023.   Complete Plavix 75 mg at 3 months post-op.   Continue on ASA 81 mg daily lifelong.   Continue on Crestor 40 mg daily with LDL goal 70 or less recommended. Return for fasting lipids.   Sternotomy restrictions lifted now at 12 weeks post-op. Sternotomy site healed well.  No dental work or cleanings for 6 months postop.  Continue with phase II cardiac rehab.     - Comprehensive metabolic panel  - N terminal pro BNP outpatient  - Hemoglobin A1c  - Lipid Profile; Future  -  Lower Extremity Venous Duplex Left; Future    2. S/P patent foramen ovale closure (9/22/2023)    3. Postoperative atrial fibrillation (H)  Completed Amiodarone taper.   No recurrence of AF identified.     4. Restrictive pericarditis- Post op CABG  Repeat echocardiogram at one month post-op with resolution of pericardial effusion.   Patient completed full 6 weeks of Colchicine.     5. History of coronary artery stent placement (2012)  Known CAD with a 2.5 x 38 mm Promus drug-eluting stent to the proximal circumflex in 2012.    7. History of right common carotid artery stent placement (5/1/2023)  Right ICA stenosis, subacute  right frontal infarcts s/p stent placement (EV3 Protege 10x30).  Vascular recommended DAPT- aspirin 81 and plavix 75 daily for 90 days.  Continue on high intensity statin.     8. Hyperlipidemia LDL goal <70  Continue on rosuvastatin 40 mg nightly.    9. Prediabetes  - Hemoglobin A1c    10. Edema of left lower leg  Continued edema, tightness and discomfort in left leg, site of SVG.   Lasix as needed without much improvement.   Ddimer ordered, if elevated he will proceed with US venous imaging.     - D dimer quantitative        Orders Placed This Encounter   Procedures    US Lower Extremity Venous Duplex Left    CBC with platelets    Comprehensive metabolic panel    N terminal pro BNP outpatient    Hemoglobin A1c    D dimer quantitative    Lipid Profile     Thank you for allowing me to participate in the care of your patient. Please do not hesitate to contact me if you have any questions.     Corazon Dawkins, APRN CNP [facility]

## 2023-12-15 ENCOUNTER — HOSPITAL ENCOUNTER (OUTPATIENT)
Dept: CARDIAC REHAB | Facility: OTHER | Age: 68
Discharge: HOME | End: 2023-12-15
Attending: STUDENT IN AN ORGANIZED HEALTH CARE EDUCATION/TRAINING PROGRAM
Payer: MEDICARE

## 2023-12-15 PROCEDURE — 93798 PHYS/QHP OP CAR RHAB W/ECG: CPT

## 2023-12-18 ENCOUNTER — HOSPITAL ENCOUNTER (OUTPATIENT)
Dept: CARDIAC REHAB | Facility: OTHER | Age: 68
Discharge: HOME | End: 2023-12-18
Attending: STUDENT IN AN ORGANIZED HEALTH CARE EDUCATION/TRAINING PROGRAM
Payer: MEDICARE

## 2023-12-18 PROCEDURE — 93798 PHYS/QHP OP CAR RHAB W/ECG: CPT

## 2023-12-19 NOTE — PROGRESS NOTES
09/14/23 0500   Appointment Info   Signing clinician's name / credentials Graham Rahmanalfonso, PT, OCS   Medical Diagnosis Chronic left shoulder pain (M25.512, G89.29), Traumatic incomplete tear of left rotator cuff, initial encounter (S46.012A)   PT Tx Diagnosis Left shoulder pain, weakness, impaired range of motion, postural dysfunction   Progress Note/Certification   Start of Care Date 09/14/23   Onset of illness/injury or Date of Surgery 03/15/23   Therapy Frequency 10 visits   Predicted Duration 12 weeks   Certification date from 09/14/23   Certification date to 12/08/23   GOALS   PT Goals 2;3   PT Goal 1   Goal Identifier Left shoulder pain   Goal Description Patient will report the ability to sleep through an entire night without waking from left shoulder pain.   Rationale to maximize safety and independence with performance of ADLs and functional tasks;to maximize safety and independence with self cares;to maximize safety and independence within the home   Target Date 12/08/23   PT Goal 2   Goal Identifier Range of motion   Goal Description Patient will demonstrate 140 degrees or greater left shoulder flexion to allow reaching to an overhead shelf without pain or limitation.   Rationale to maximize safety and independence with performance of ADLs and functional tasks;to maximize safety and independence within the home;to maximize safety and independence within the community   Target Date 12/08/23   PT Goal 3   Goal Identifier Weakness   Goal Description Patient will demonstrate 4+/5 grade or greater left shoulder strength in all planes to allow lifting a 1 pound object to an overhead shelf without pain or compensation.   Rationale to maximize safety and independence with performance of ADLs and functional tasks;to maximize safety and independence within the home;to maximize safety and independence within the community   Target Date 12/08/23   Subjective Report   Subjective Report See Eval   Treatment  Interventions (PT)   Interventions Therapeutic Procedure/Exercise   Therapeutic Procedure/Exercise   Therapeutic Procedures: strength, endurance, ROM, flexibillity minutes (07688) 25   Ther Proc 1 - Details Discussed current symptoms and PT exam findings.  Discussed ways to decrease shoulder pain during functional movements.  Discussed exercises should be performed in a pain-free manner.  Patient is scheduled for an upcoming open heart surgery.  Discussed he will most likely have sternal precautions following surgery.  Recommended that he get clearance from his cardiothoracic surgeon prior to resuming shoulder exercises after surgery.  Exercises: Supine flexion starting at 90 degrees working through a pain-free arc of motion x 10. Side ER x10.  Side Abduction to 70-90 degrees x 10.-Perform once a day or as symptoms allow.  Reviewed working through a pain-free arc of motion with all exercises.  Discussed goal of low weight and high repetition program starting with no weight and gradually working up to 30 repetitions as symptoms allow.   Skilled Intervention Education, HEP, range of motion   Patient Response/Progress Patient verbalized and demonstrated understanding of home program and symptom management techniques   Eval/Assessments   PT Eval, Low Complexity Minutes (88112) 15   Education   Learner/Method Patient;Listening;Reading;Demonstration;Pictures/Video;No Barriers to Learning   Plan   Home program Supine flexion starting at 90 degrees working through a pain-free arc of motion x 10. Side ER x10. Side Abduction to 70-90 degrees x 10.-Perform once a day or as symptoms allow   Plan for next session Patient will schedule a follow-up physical therapy after he has been cleared by cardiothoracic surgery to resume shoulder exercises.   Total Session Time   Timed Code Treatment Minutes 25   Total Treatment Time (sum of timed and untimed services) 40         DISCHARGE  Reason for Discharge: Patient was seen for initial  evaluation only.  Patient was scheduled for cardiothoracic surgery.  See evaluation for further details.        Discharge Plan: Patient to continue home program.  Patient will follow cardiothoracic postop precautions discussed with surgeon when he can resume PT exercises.    Referring Provider:  Erica Hackett

## 2023-12-20 ENCOUNTER — HOSPITAL ENCOUNTER (OUTPATIENT)
Dept: CARDIAC REHAB | Facility: OTHER | Age: 68
Discharge: HOME | End: 2023-12-20
Attending: STUDENT IN AN ORGANIZED HEALTH CARE EDUCATION/TRAINING PROGRAM
Payer: MEDICARE

## 2023-12-20 PROCEDURE — 93798 PHYS/QHP OP CAR RHAB W/ECG: CPT

## 2023-12-22 ENCOUNTER — HOSPITAL ENCOUNTER (OUTPATIENT)
Dept: CARDIAC REHAB | Facility: OTHER | Age: 68
Discharge: HOME | End: 2023-12-22
Attending: STUDENT IN AN ORGANIZED HEALTH CARE EDUCATION/TRAINING PROGRAM
Payer: MEDICARE

## 2023-12-22 PROCEDURE — 93798 PHYS/QHP OP CAR RHAB W/ECG: CPT

## 2023-12-27 ENCOUNTER — HOSPITAL ENCOUNTER (OUTPATIENT)
Dept: CARDIAC REHAB | Facility: OTHER | Age: 68
Discharge: HOME | End: 2023-12-27
Attending: STUDENT IN AN ORGANIZED HEALTH CARE EDUCATION/TRAINING PROGRAM
Payer: MEDICARE

## 2023-12-27 PROCEDURE — 93798 PHYS/QHP OP CAR RHAB W/ECG: CPT

## 2023-12-29 ENCOUNTER — HOSPITAL ENCOUNTER (OUTPATIENT)
Dept: CARDIAC REHAB | Facility: OTHER | Age: 68
Discharge: HOME | End: 2023-12-29
Attending: STUDENT IN AN ORGANIZED HEALTH CARE EDUCATION/TRAINING PROGRAM
Payer: MEDICARE

## 2023-12-29 PROCEDURE — 93798 PHYS/QHP OP CAR RHAB W/ECG: CPT

## 2024-01-02 ASSESSMENT — ENCOUNTER SYMPTOMS
SHORTNESS OF BREATH: 0
HEADACHES: 0
PALPITATIONS: 0
HEARTBURN: 0
SORE THROAT: 0
WEAKNESS: 0
CONSTIPATION: 0
HEMATURIA: 0
EYE PAIN: 0
ARTHRALGIAS: 1
JOINT SWELLING: 0
DIZZINESS: 0
NERVOUS/ANXIOUS: 0
MYALGIAS: 0
FREQUENCY: 0
COUGH: 0
DIARRHEA: 0
DYSURIA: 0
FEVER: 0
HEMATOCHEZIA: 0
PARESTHESIAS: 0
CHILLS: 0
NAUSEA: 0
ABDOMINAL PAIN: 0

## 2024-01-02 ASSESSMENT — ACTIVITIES OF DAILY LIVING (ADL): CURRENT_FUNCTION: NO ASSISTANCE NEEDED

## 2024-01-03 ENCOUNTER — HOSPITAL ENCOUNTER (OUTPATIENT)
Dept: CARDIAC REHAB | Facility: OTHER | Age: 69
Discharge: HOME | End: 2024-01-03
Attending: STUDENT IN AN ORGANIZED HEALTH CARE EDUCATION/TRAINING PROGRAM
Payer: MEDICARE

## 2024-01-03 PROCEDURE — 93798 PHYS/QHP OP CAR RHAB W/ECG: CPT

## 2024-01-04 ENCOUNTER — MYC MEDICAL ADVICE (OUTPATIENT)
Dept: PULMONOLOGY | Facility: OTHER | Age: 69
End: 2024-01-04

## 2024-01-04 ENCOUNTER — OFFICE VISIT (OUTPATIENT)
Dept: FAMILY MEDICINE | Facility: OTHER | Age: 69
End: 2024-01-04
Attending: PHYSICIAN ASSISTANT
Payer: COMMERCIAL

## 2024-01-04 VITALS
RESPIRATION RATE: 20 BRPM | HEIGHT: 70 IN | BODY MASS INDEX: 32.78 KG/M2 | TEMPERATURE: 96.9 F | WEIGHT: 229 LBS | DIASTOLIC BLOOD PRESSURE: 78 MMHG | OXYGEN SATURATION: 99 % | HEART RATE: 68 BPM | SYSTOLIC BLOOD PRESSURE: 138 MMHG

## 2024-01-04 DIAGNOSIS — E66.811 CLASS 1 OBESITY DUE TO EXCESS CALORIES WITH SERIOUS COMORBIDITY AND BODY MASS INDEX (BMI) OF 32.0 TO 32.9 IN ADULT: ICD-10-CM

## 2024-01-04 DIAGNOSIS — M25.512 CHRONIC LEFT SHOULDER PAIN: ICD-10-CM

## 2024-01-04 DIAGNOSIS — I97.89 POSTOPERATIVE ATRIAL FIBRILLATION (H): ICD-10-CM

## 2024-01-04 DIAGNOSIS — E78.5 HYPERLIPIDEMIA LDL GOAL <70: ICD-10-CM

## 2024-01-04 DIAGNOSIS — N52.9 ERECTILE DYSFUNCTION, UNSPECIFIED ERECTILE DYSFUNCTION TYPE: ICD-10-CM

## 2024-01-04 DIAGNOSIS — D69.6 DISORDER INVOLVING THROMBOCYTOPENIA (H): ICD-10-CM

## 2024-01-04 DIAGNOSIS — S46.012A TRAUMATIC INCOMPLETE TEAR OF LEFT ROTATOR CUFF, INITIAL ENCOUNTER: ICD-10-CM

## 2024-01-04 DIAGNOSIS — I48.91 POSTOPERATIVE ATRIAL FIBRILLATION (H): ICD-10-CM

## 2024-01-04 DIAGNOSIS — G89.29 CHRONIC LEFT SHOULDER PAIN: ICD-10-CM

## 2024-01-04 DIAGNOSIS — E66.09 CLASS 1 OBESITY DUE TO EXCESS CALORIES WITH SERIOUS COMORBIDITY AND BODY MASS INDEX (BMI) OF 32.0 TO 32.9 IN ADULT: ICD-10-CM

## 2024-01-04 DIAGNOSIS — I10 ESSENTIAL HYPERTENSION: ICD-10-CM

## 2024-01-04 DIAGNOSIS — M05.79 RHEUMATOID ARTHRITIS INVOLVING MULTIPLE SITES WITH POSITIVE RHEUMATOID FACTOR (H): ICD-10-CM

## 2024-01-04 DIAGNOSIS — Z00.00 ENCOUNTER FOR MEDICARE ANNUAL WELLNESS EXAM: Primary | ICD-10-CM

## 2024-01-04 DIAGNOSIS — Z95.1 S/P CABG X 3: ICD-10-CM

## 2024-01-04 DIAGNOSIS — G47.30 SLEEP APNEA, UNSPECIFIED TYPE: ICD-10-CM

## 2024-01-04 DIAGNOSIS — Q21.12 PATENT FORAMEN OVALE: ICD-10-CM

## 2024-01-04 DIAGNOSIS — R73.03 PREDIABETES: ICD-10-CM

## 2024-01-04 LAB
ALBUMIN SERPL BCG-MCNC: 4.6 G/DL (ref 3.5–5.2)
ALP SERPL-CCNC: 80 U/L (ref 40–150)
ALT SERPL W P-5'-P-CCNC: 28 U/L (ref 0–70)
ANION GAP SERPL CALCULATED.3IONS-SCNC: 8 MMOL/L (ref 7–15)
AST SERPL W P-5'-P-CCNC: 30 U/L (ref 0–45)
BASOPHILS # BLD AUTO: 0 10E3/UL (ref 0–0.2)
BASOPHILS NFR BLD AUTO: 0 %
BILIRUB SERPL-MCNC: 0.4 MG/DL
BUN SERPL-MCNC: 23.2 MG/DL (ref 8–23)
CALCIUM SERPL-MCNC: 9.7 MG/DL (ref 8.8–10.2)
CHLORIDE SERPL-SCNC: 103 MMOL/L (ref 98–107)
CHOLEST SERPL-MCNC: 137 MG/DL
CREAT SERPL-MCNC: 0.85 MG/DL (ref 0.67–1.17)
CREAT UR-MCNC: 90.5 MG/DL
DEPRECATED HCO3 PLAS-SCNC: 28 MMOL/L (ref 22–29)
EGFRCR SERPLBLD CKD-EPI 2021: >90 ML/MIN/1.73M2
EOSINOPHIL # BLD AUTO: 0.1 10E3/UL (ref 0–0.7)
EOSINOPHIL NFR BLD AUTO: 2 %
ERYTHROCYTE [DISTWIDTH] IN BLOOD BY AUTOMATED COUNT: 15.3 % (ref 10–15)
FASTING STATUS PATIENT QL REPORTED: NO
GLUCOSE SERPL-MCNC: 109 MG/DL (ref 70–99)
HCT VFR BLD AUTO: 43.7 % (ref 40–53)
HDLC SERPL-MCNC: 52 MG/DL
HGB BLD-MCNC: 14 G/DL (ref 13.3–17.7)
IMM GRANULOCYTES # BLD: 0 10E3/UL
IMM GRANULOCYTES NFR BLD: 0 %
LDLC SERPL CALC-MCNC: 70 MG/DL
LYMPHOCYTES # BLD AUTO: 1.8 10E3/UL (ref 0.8–5.3)
LYMPHOCYTES NFR BLD AUTO: 39 %
MCH RBC QN AUTO: 29.7 PG (ref 26.5–33)
MCHC RBC AUTO-ENTMCNC: 32 G/DL (ref 31.5–36.5)
MCV RBC AUTO: 93 FL (ref 78–100)
MICROALBUMIN UR-MCNC: 235 MG/L
MICROALBUMIN/CREAT UR: 259.67 MG/G CR (ref 0–17)
MONOCYTES # BLD AUTO: 0.3 10E3/UL (ref 0–1.3)
MONOCYTES NFR BLD AUTO: 6 %
NEUTROPHILS # BLD AUTO: 2.5 10E3/UL (ref 1.6–8.3)
NEUTROPHILS NFR BLD AUTO: 53 %
NONHDLC SERPL-MCNC: 85 MG/DL
NRBC # BLD AUTO: 0 10E3/UL
NRBC BLD AUTO-RTO: 0 /100
PLATELET # BLD AUTO: 142 10E3/UL (ref 150–450)
POTASSIUM SERPL-SCNC: 4.6 MMOL/L (ref 3.4–5.3)
PROT SERPL-MCNC: 7.5 G/DL (ref 6.4–8.3)
RBC # BLD AUTO: 4.71 10E6/UL (ref 4.4–5.9)
SODIUM SERPL-SCNC: 139 MMOL/L (ref 135–145)
TRIGL SERPL-MCNC: 76 MG/DL
WBC # BLD AUTO: 4.7 10E3/UL (ref 4–11)

## 2024-01-04 PROCEDURE — G0439 PPPS, SUBSEQ VISIT: HCPCS | Performed by: PHYSICIAN ASSISTANT

## 2024-01-04 PROCEDURE — 80061 LIPID PANEL: CPT | Mod: ZL | Performed by: PHYSICIAN ASSISTANT

## 2024-01-04 PROCEDURE — G0463 HOSPITAL OUTPT CLINIC VISIT: HCPCS | Mod: 25

## 2024-01-04 PROCEDURE — 85004 AUTOMATED DIFF WBC COUNT: CPT | Mod: ZL | Performed by: PHYSICIAN ASSISTANT

## 2024-01-04 PROCEDURE — 80053 COMPREHEN METABOLIC PANEL: CPT | Mod: ZL | Performed by: PHYSICIAN ASSISTANT

## 2024-01-04 PROCEDURE — 82570 ASSAY OF URINE CREATININE: CPT | Mod: ZL | Performed by: PHYSICIAN ASSISTANT

## 2024-01-04 PROCEDURE — G0463 HOSPITAL OUTPT CLINIC VISIT: HCPCS

## 2024-01-04 PROCEDURE — 99214 OFFICE O/P EST MOD 30 MIN: CPT | Mod: 25 | Performed by: PHYSICIAN ASSISTANT

## 2024-01-04 PROCEDURE — 36415 COLL VENOUS BLD VENIPUNCTURE: CPT | Mod: ZL | Performed by: PHYSICIAN ASSISTANT

## 2024-01-04 RX ORDER — SILDENAFIL 50 MG/1
50 TABLET, FILM COATED ORAL DAILY PRN
Qty: 10 TABLET | Refills: 11 | Status: SHIPPED | OUTPATIENT
Start: 2024-01-04

## 2024-01-04 RX ORDER — NITROGLYCERIN 0.4 MG/1
TABLET SUBLINGUAL
Qty: 25 TABLET | Refills: 0 | Status: SHIPPED | OUTPATIENT
Start: 2024-01-04

## 2024-01-04 ASSESSMENT — PAIN SCALES - GENERAL: PAINLEVEL: NO PAIN (0)

## 2024-01-04 NOTE — NURSING NOTE
"Patient presents to the clinic for medicare wellness.    FOOD SECURITY SCREENING QUESTIONS:    The next two questions are to help us understand your food security.  If you are feeling you need any assistance in this area, we have resources available to support you today.    Hunger Vital Signs:  Within the past 12 months we worried whether our food would run out before we got money to buy more. Never  Within the past 12 months the food we bought just didn't last and we didn't have money to get more. Never    Advance Care Directive on file? yes  Advance Care Directive provided to patient? Declined.    Chief Complaint   Patient presents with    Medicare Visit     Wellness         Initial /78 (BP Location: Right arm, Patient Position: Sitting, Cuff Size: Adult Large)   Pulse 68   Temp 96.9  F (36.1  C) (Tympanic)   Resp 20   Ht 1.784 m (5' 10.25\")   Wt 103.9 kg (229 lb)   SpO2 99%   BMI 32.62 kg/m   Estimated body mass index is 32.62 kg/m  as calculated from the following:    Height as of this encounter: 1.784 m (5' 10.25\").    Weight as of this encounter: 103.9 kg (229 lb).  Medication Reconciliation: complete        Fariba Sloan LPN       "

## 2024-01-04 NOTE — PROGRESS NOTES
SUBJECTIVE:   Kirk John is a 68 year old male who presents for Preventive Visit.    Kirk presents to the clinic today for Medicare annual wellness physical and to discuss the following:  Cardiology: Status post three-vessel CABG with PFO closure on 9/22/2023 at [facility].  He continues to follow closely with the cardiology team, his most recent visit was on 12/14/2023.  He was 12 weeks postop from CABG.  He has had no palpitations or recurrence of atrial fibrillation symptoms since he completed amiodarone.  He continues to decline chest pain, shortness of breath, lightheadedness and or syncope.  Cardiology recommended to continue 81 mg aspirin lifelong, Crestor 40 mg daily with an LDL goal of 70 or less.  His sternotomy restrictions were lifted at recent visit as he was 12 weeks postop.  It was recommended that he continue with cardiac rehab and avoid dental work/cleaning until he was 6 months postop.  At his cardiology visit he did have an elevated D-dimer, prompting patient to return for a left lower extremity ultrasound, this was negative for DVT.  Cardiology had patient continue with Lasix as needed, salt restrictions and close monitoring.  He continues to follow a low-salt diet and close monitoring of blood pressure at home, no readings of greater than 140/90.  He will be attending his 24th cardiac rehab visit in the upcoming days, he is able to attend a total of 36 sessions per his insurance.  He feels as if he is getting much stronger day by day.  Kirk completed his course of Plavix on 12/15/2023 (he was to be on this until 3 months post op).  Concerns of sleep study, he has a smart watch that he has been monitoring his blood oxygen saturations, this was recommended by [facility] as they were concerned about his blood oxygen levels during hospitalization.  He states his blood oxygen levels have been ranging 73 to 100% over the last 6 months, on average he is around 95%.  He asked his wife if she  noticed any snoring or gasping or episodes of apnea, she states he mildly snored but otherwise no apneic events visualized.  He sleeps on average 6 hours per night, he is sleeping better now that he is back in bed (he was previously sleeping in a recliner due to postoperative restrictions after sternotomy).  He would like to complete a sleep study to rule out sleep apnea.  Chronic left shoulder pain, status post surgery.  He has both decreased rotation and extension strength of the shoulder.  He previously was in physical therapy and found this extremely beneficial, he was only able to attend 1 physical therapy session on 9/14/2023 as he had underwent CABG on 9/22/2023 for coronary arterial disease and PFO.  He would like to restart physical therapy if possible.  Kirk is also wondering if he should continue to check his blood glucose at home, he has no readings greater than 200 and or under 70.  He is aware of hypoglycemic symptomatology including dizziness/fatigue.  He had his A1c last performed by cardiology and December, he states this is normal.  On average he checks once to twice a week now, he is wondering if he can cut back on this as he continues to focus on weight loss and lifestyle modifications.  Erectile dysfunction, he was previously prescribed sildenafil 50 mg by mouth as needed, this prescription has not been filled in quite a while due to cardiac pathology.  He is wondering if he can have this refilled today.    Patient has been advised of split billing requirements and indicates understanding: Yes  Are you in the first 12 months of your Medicare Part B coverage?  No    Do you feel safe in your environment? Yes    Have you ever done Advance Care Planning? (For example, a Health Directive, POLST, or a discussion with a medical provider or your loved ones about your wishes): Yes, advance care planning is on file.    Additional concerns to address?  YES    Fall risk:  Fallen 2 or more times in the past  year?: No  Any fall with injury in the past year?: Yes    Cognitive Screenin) Repeat 3 items (Leader, Season, Table)    2) Clock draw: NORMAL  3) 3 item recall: Recalls 3 objects  Results: 3 items recalled: COGNITIVE IMPAIRMENT LESS LIKELY    Mini-CogTM Copyright DANIA Ricardo. Licensed by the author for use in [facility]; reprinted with permission ([email or web address]). All rights reserved.      Do you have sleep apnea, excessive snoring or daytime drowsiness? : yes    Reviewed and updated as needed this visit by clinical staff   Tobacco  Allergies  Meds  Problems  Med Hx  Surg Hx  Fam Hx          Reviewed and updated as needed this visit by Provider   Tobacco  Allergies  Meds  Problems  Med Hx  Surg Hx  Fam Hx         Social History     Tobacco Use    Smoking status: Former     Packs/day: 1.00     Years: 9.00     Additional pack years: 0.00     Total pack years: 9.00     Types: Cigarettes     Start date:      Quit date: 10/11/1984     Years since quittin.2     Passive exposure: Never    Smokeless tobacco: Never    Tobacco comments:     Last smoked over 40 years ago-early    Substance Use Topics    Alcohol use: Yes     Comment: 2 a week             2024     8:08 AM   Alcohol Use   Prescreen: >3 drinks/day or >7 drinks/week? No          No data to display              Do you have a current opioid prescription? No  Do you use any other controlled substances or medications that are not prescribed by a provider? None                      Current providers sharing in care for this patient include:   Patient Care Team:  Erica Hackett PA-C as PCP - General (Family Medicine)  Erica Hackett PA-C as Assigned PCP  Corazon Dawkins APRN CNP as Assigned Heart and Vascular Provider    The following health maintenance items are reviewed in Epic and correct as of today:  Health Maintenance   Topic Date Due    MEDICARE ANNUAL WELLNESS VISIT  2025    BMP  2025    LIPID   "01/04/2025    MICROALBUMIN  01/04/2025    FALL RISK ASSESSMENT  01/04/2025    COLORECTAL CANCER SCREENING  01/25/2026    ADVANCE CARE PLANNING  01/04/2029    DTAP/TDAP/TD IMMUNIZATION (4 - Td or Tdap) 11/22/2033    HEPATITIS C SCREENING  Completed    PHQ-2 (once per calendar year)  Completed    INFLUENZA VACCINE  Completed    Pneumococcal Vaccine: 65+ Years  Completed    URINALYSIS  Completed    ZOSTER IMMUNIZATION  Completed    RSV VACCINE (Pregnancy & 60+)  Completed    AORTIC ANEURYSM SCREENING (SYSTEM ASSIGNED)  Completed    COVID-19 Vaccine  Completed    IPV IMMUNIZATION  Aged Out    HPV IMMUNIZATION  Aged Out    MENINGITIS IMMUNIZATION  Aged Out    RSV MONOCLONAL ANTIBODY  Aged Out     Labs reviewed in EPIC    ROS:  Constitutional, HEENT, cardiovascular, pulmonary, GI, , musculoskeletal, neuro, skin, endocrine and psych systems are negative, except as otherwise noted.    OBJECTIVE:   /78 (BP Location: Right arm, Patient Position: Sitting, Cuff Size: Adult Large)   Pulse 68   Temp 96.9  F (36.1  C) (Tympanic)   Resp 20   Ht 1.784 m (5' 10.25\")   Wt 103.9 kg (229 lb)   SpO2 99%   BMI 32.62 kg/m   Estimated body mass index is 32.62 kg/m  as calculated from the following:    Height as of this encounter: 1.784 m (5' 10.25\").    Weight as of this encounter: 103.9 kg (229 lb).  EXAM:   GENERAL: healthy, alert and no distress  EYES: Eyes grossly normal to inspection, PERRL and conjunctivae and sclerae normal  HENT: ear canals and TM's normal, nose and mouth without ulcers or lesions  NECK: no adenopathy, no asymmetry, masses, or scars and thyroid normal to palpation  RESP: lungs clear to auscultation - no rales, rhonchi or wheezes  CV: regular rate and rhythm, normal S1 S2, no S3 or S4, no murmur, click or rub, no peripheral edema and peripheral pulses strong  ABDOMEN: soft, nontender, no hepatosplenomegaly, no masses and bowel sounds normal  MS:   LEFT SHOULDER PHYSICAL EXAMINATION:  Gross Examination: " shoulders appear symmetrical in appearance, no signs of bony deformity over the AC, clavicle or glenohumeral joints. No signs of previous or current trauma. No signs of ecchymosis. Skin is intact.     Palpation: Tenderness to palpation: AC joint, anterior capsule, proximal biceps tendon, and supraspinatus insertion    ROM: flexion 170*, extension 45*, abduction 170*, adduction 40*, IR/ER belt line level    Special Testing:   Shoulder Strength:    Speed/Yergason (bicep): negative      Instability:    Apprehension: negative    Cross Over: negative     Impingement:    Neer: negative    Barton: not done    Empty Can (supraspinatus): not done    Neurovascular status: distal pulses are intact and are 2+. Two point discrimination intact. Distal capillary refill intact < 3 seconds.   SKIN: no suspicious lesions or rashes  NEURO: Normal strength and tone, mentation intact and speech normal  PSYCH: mentation appears normal, affect normal/bright  LYMPH: normal ant/post cervical, supraclavicular nodes    Diagnostic Test Results:  Results for orders placed or performed in visit on 01/04/24 (from the past 24 hour(s))   Albumin Random Urine Quantitative with Creat Ratio   Result Value Ref Range    Creatinine Urine mg/dL 90.5 mg/dL    Albumin Urine mg/L 235.0 mg/L    Albumin Urine mg/g Cr 259.67 (H) 0.00 - 17.00 mg/g Cr   CBC and Differential    Narrative    The following orders were created for panel order CBC and Differential.  Procedure                               Abnormality         Status                     ---------                               -----------         ------                     CBC with platelets and d...[[id number]]  Abnormal            Final result                 Please view results for these tests on the individual orders.   Comprehensive Metabolic Panel   Result Value Ref Range    Sodium 139 135 - 145 mmol/L    Potassium 4.6 3.4 - 5.3 mmol/L    Carbon Dioxide (CO2) 28 22 - 29 mmol/L    Anion Gap 8 7 - 15  mmol/L    Urea Nitrogen 23.2 (H) 8.0 - 23.0 mg/dL    Creatinine 0.85 0.67 - 1.17 mg/dL    GFR Estimate >90 >60 mL/min/1.73m2    Calcium 9.7 8.8 - 10.2 mg/dL    Chloride 103 98 - 107 mmol/L    Glucose 109 (H) 70 - 99 mg/dL    Alkaline Phosphatase 80 40 - 150 U/L    AST 30 0 - 45 U/L    ALT 28 0 - 70 U/L    Protein Total 7.5 6.4 - 8.3 g/dL    Albumin 4.6 3.5 - 5.2 g/dL    Bilirubin Total 0.4 <=1.2 mg/dL   Lipid Profile   Result Value Ref Range    Cholesterol 137 <200 mg/dL    Triglycerides 76 <150 mg/dL    Direct Measure HDL 52 >=40 mg/dL    LDL Cholesterol Calculated 70 <=100 mg/dL    Non HDL Cholesterol 85 <130 mg/dL    Patient Fasting > 8hrs? No     Narrative    Cholesterol  Desirable:  <200 mg/dL    Triglycerides  Normal:  Less than 150 mg/dL  Borderline High:  150-199 mg/dL  High:  200-499 mg/dL  Very High:  Greater than or equal to 500 mg/dL    Direct Measure HDL  Female:  Greater than or equal to 50 mg/dL   Male:  Greater than or equal to 40 mg/dL    LDL Cholesterol  Desirable:  <100mg/dL  Above Desirable:  100-129 mg/dL   Borderline High:  130-159 mg/dL   High:  160-189 mg/dL   Very High:  >= 190 mg/dL    Non HDL Cholesterol  Desirable:  130 mg/dL  Above Desirable:  130-159 mg/dL  Borderline High:  160-189 mg/dL  High:  190-219 mg/dL  Very High:  Greater than or equal to 220 mg/dL   CBC with platelets and differential   Result Value Ref Range    WBC Count 4.7 4.0 - 11.0 10e3/uL    RBC Count 4.71 4.40 - 5.90 10e6/uL    Hemoglobin 14.0 13.3 - 17.7 g/dL    Hematocrit 43.7 40.0 - 53.0 %    MCV 93 78 - 100 fL    MCH 29.7 26.5 - 33.0 pg    MCHC 32.0 31.5 - 36.5 g/dL    RDW 15.3 (H) 10.0 - 15.0 %    Platelet Count 142 (L) 150 - 450 10e3/uL    % Neutrophils 53 %    % Lymphocytes 39 %    % Monocytes 6 %    % Eosinophils 2 %    % Basophils 0 %    % Immature Granulocytes 0 %    NRBCs per 100 WBC 0 <1 /100    Absolute Neutrophils 2.5 1.6 - 8.3 10e3/uL    Absolute Lymphocytes 1.8 0.8 - 5.3 10e3/uL    Absolute Monocytes 0.3  0.0 - 1.3 10e3/uL    Absolute Eosinophils 0.1 0.0 - 0.7 10e3/uL    Absolute Basophils 0.0 0.0 - 0.2 10e3/uL    Absolute Immature Granulocytes 0.0 <=0.4 10e3/uL    Absolute NRBCs 0.0 10e3/uL       ASSESSMENT / PLAN:       ICD-10-CM    1. Encounter for Medicare annual wellness exam  Z00.00       2. Prediabetes  R73.03 Albumin Random Urine Quantitative with Creat Ratio     CBC and Differential     Comprehensive Metabolic Panel      3. Chronic left shoulder pain  M25.512 Physical Therapy Referral    G89.29       4. Traumatic incomplete tear of left rotator cuff, initial encounter  S46.012A Physical Therapy Referral      5. Essential hypertension  I10 nitroGLYcerin (NITROSTAT) 0.4 MG sublingual tablet      6. Erectile dysfunction, unspecified erectile dysfunction type  N52.9 sildenafil (VIAGRA) 50 MG tablet      7. Sleep apnea, unspecified type  G47.30 Adult Sleep Eval & Management  Referral      8. Hyperlipidemia LDL goal <70  E78.5 CANCELED: Lipid Panel      9. S/P CABG x 3 (9/22/2023)  Z95.1 Lipid Profile      10. Rheumatoid arthritis involving multiple sites with positive rheumatoid factor (H)  M05.79       11. Postoperative atrial fibrillation (H)  I97.89     I48.91       12. Disorder involving thrombocytopenia (H24)  D69.6       13. Patent foramen ovale  Q21.12     Closure 9/22/2023      14. Class 1 obesity due to excess calories with serious comorbidity and body mass index (BMI) of 32.0 to 32.9 in adult  E66.09     Z68.32         Medicare annual wellness visit completed today.  All care gaps are completed.  Patient is up-to-date on all immunizations and screening.  Prediabetes, most recent A1c returning at 5.5%, this is the lowest value in the last 5 years.  This was previously 6.3% when checked 1 year ago.  Congratulated on ongoing lifestyle modifications as these have aided in glycemic control and have benefited both endocrine and cardiopulmonary health.  Kirk is able to cut back on glucose fingerstick  testing to once a week or once every other week.  He is aware of hypoglycemic symptoms and will have supplies on hand so if the symptoms arise he can test.  Continue to monitor annually, sooner if questions or concerns arise.  Chronic left shoulder pain, this has been bothersome for quite a while, unfortunately, Kirk was only able to attend 1 physical therapy session in September due to cardiac pathology requiring a CABG approximately 1 week later.  I do believe that he would benefit from an additional course of physical therapy to work on strength and range of motion of the left shoulder, he is agreeable to this, referral was placed, he will be contacted by the physical therapy department to schedule.  Traumatic rotator cuff tear, this is led to chronic left shoulder pain.  Due to ongoing symptomatology, I am in agreement that physical therapy would be extremely beneficial, referral placed.  Essential hypertension, blood pressure ranges overall been at goal, blood pressure slightly higher today as patient had to run out to his car just prior to our visit.  He will continue with low-salt diet and monitoring blood pressures at home.  He is up-to-date on refills of metoprolol and lisinopril.  He continues on potassium as prescribed by cardiology.  Erectile dysfunction, this has been present chronically.  An updated prescription of sildenafil to utilize prior to sexual intercourse was prescribed for patient today.  He will avoid nitroglycerin while on sildenafil due to vasodilation effect of both medications.  Concerns of sleep apnea with decreased blood oxygen levels at nighttime as well as daytime somnolence.  A referral for sleep medicine was placed to [place] [facility], pulm will be contacted to fill out questionnaires followed by scheduling a visit and/or sleep study if indicated.  Hyperlipidemia with goal of LDL less than 70, updated fasting lipid panel was obtained today.  Cholesterol 137 (this is improved from 139  "when checked 1 year ago), triglycerides 76 (this is improved from 84 when checked 1 year ago), HDL 52 (improved from 44 when checked 1 year ago), LDL 70 (this is at goal, previously 78 when checked last).  Continue with close follow-up with cardiology as well as rosuvastatin 40 mg nightly.  He is up-to-date on prescription refills.  Status post CABG on 9/22/2023.  He continues to follow closely with cardiology.  He has completed his 3-month course of Plavix on 12/15/2023.  He will continue on lifelong 81 mg aspirin, discussed the importance of tight glycemic, blood pressure and lipid management.  LDL at goal of 70.  He was released from sternotomy restrictions at his visit on 12/14/2023.  He will continue to follow closely with cardiac rehab as well as cardiology department.  Positive rheumatoid factor, continue to monitor.  May benefit from rheumatology consult.  Postoperative atrial fibrillation, he completed an amiodarone taper, there has been no recurrence of atrial fibrillation symptoms.  Continue to closely follow with cardiology.  Thrombocytopenia, has followed with primary care and subspecialty departments.  This is stable, continue to closely monitor.  If progression would benefit from hematology consult.  Patent foramen ovale closure on 9/22/2023, this is stable.  Continued close follow-up with cardiology.  Obesity, class I, BMI 32.62, Kirk continues to work on weight loss and lifestyle modifications at home.  Reviewed goal exercise under 50 minutes of moderate exercise per week, increase lean proteins, fruits and vegetables.  Low-salt diet.  Weight loss would aid cardiopulmonary health.    Patient has been advised of split billing requirements and indicates understanding: Yes    COUNSELING:  Reviewed preventive health counseling, as reflected in patient instructions    Estimated body mass index is 32.62 kg/m  as calculated from the following:    Height as of this encounter: 1.784 m (5' 10.25\").    Weight " "as of this encounter: 103.9 kg (229 lb).    Weight management plan: Discussed healthy diet and exercise guidelines    He reports that he quit smoking about 39 years ago. His smoking use included cigarettes. He started smoking about 49 years ago. He has a 9 pack-year smoking history. He has never been exposed to tobacco smoke. He has never used smokeless tobacco.    I have reviewed Opioid Use Disorder and Substance Use Disorder risk factors and made any needed referrals.   Appropriate preventive services were discussed with this patient, including applicable screening as appropriate for cardiovascular disease, diabetes, osteopenia/osteoporosis, and glaucoma.  As appropriate for age/gender, discussed screening for colorectal cancer, prostate cancer, breast cancer, and cervical cancer. Checklist reviewing preventive services available has been given to the patient.    Reviewed patients plan of care and provided an AVS. The Intermediate Care Plan ( asthma action plan, low back pain action plan, and migraine action plan) for Kirk meets the Care Plan requirement. This Care Plan has been established and reviewed with the Patient.    Counseling Resources:  ATP IV Guidelines  Pooled Cohorts Equation Calculator  Breast Cancer Risk Calculator  BRCA-Related Cancer Risk Assessment: FHS-7 Tool  FRAX Risk Assessment  ICSI Preventive Guidelines  Dietary Guidelines for Americans, 2010  USDA's MyPlate  ASA Prophylaxis  Lung CA Screening    Erica Hackett PA-C  [facility]    Answers submitted by the patient for this visit:  Annual Preventive Visit (Submitted on 1/2/2024)  Chief Complaint: Annual Exam:  In general, how would you rate your overall physical health?: good  Frequency of exercise:: 4-5 days/week  Do you usually eat at least 4 servings of fruit and vegetables a day, include whole grains & fiber, and avoid regularly eating high fat or \"junk\" foods? : Yes  Taking medications regularly:: Yes  Activities " of Daily Living: no assistance needed  Home safety: no safety concerns identified  Hearing Impairment:: difficulty following a conversation in a noisy restaurant or crowded room, find that men's voices are easier to understand than woman's  In the past 6 months, have you been bothered by leaking of urine?: Yes  abdominal pain: No  Blood in stool: No  Blood in urine: No  chest pain: No  chills: No  congestion: No  constipation: No  cough: No  diarrhea: No  dizziness: No  ear pain: No  eye pain: No  nervous/anxious: No  fever: No  frequency: No  genital sores: No  headaches: No  hearing loss: No  heartburn: No  arthralgias: Yes  joint swelling: No  peripheral edema: Yes  mood changes: No  myalgias: No  nausea: No  dysuria: No  palpitations: No  Skin sensation changes: No  sore throat: No  urgency: Yes  rash: No  shortness of breath: No  visual disturbance: No  weakness: No  impotence: Yes  penile discharge: No  In general, how would you rate your overall mental or emotional health?: good  Additional concerns today:: No  Exercise outside of work (Submitted on 1/2/2024)  Chief Complaint: Annual Exam:  Duration of exercise:: 30-45 minutes

## 2024-01-04 NOTE — PATIENT INSTRUCTIONS
Patient Education   Personalized Prevention Plan  You are due for the preventive services outlined below.  Your care team is available to assist you in scheduling these services.  If you have already completed any of these items, please share that information with your care team to update in your medical record.  Health Maintenance Due   Topic Date Due     Cholesterol Lab  12/14/2023     Kidney Microalbumin Urine Test  12/14/2023

## 2024-01-05 ENCOUNTER — HOSPITAL ENCOUNTER (OUTPATIENT)
Dept: CARDIAC REHAB | Facility: OTHER | Age: 69
Discharge: HOME | End: 2024-01-05
Attending: STUDENT IN AN ORGANIZED HEALTH CARE EDUCATION/TRAINING PROGRAM
Payer: MEDICARE

## 2024-01-05 PROCEDURE — 93798 PHYS/QHP OP CAR RHAB W/ECG: CPT

## 2024-01-05 ASSESSMENT — SLEEP AND FATIGUE QUESTIONNAIRES
HOW LIKELY ARE YOU TO NOD OFF OR FALL ASLEEP WHILE SITTING INACTIVE IN A PUBLIC PLACE: WOULD NEVER DOZE
HOW LIKELY ARE YOU TO NOD OFF OR FALL ASLEEP WHEN YOU ARE A PASSENGER IN A CAR FOR AN HOUR WITHOUT A BREAK: WOULD NEVER DOZE
HOW LIKELY ARE YOU TO NOD OFF OR FALL ASLEEP WHILE SITTING QUIETLY AFTER LUNCH WITHOUT ALCOHOL: SLIGHT CHANCE OF DOZING
HOW LIKELY ARE YOU TO NOD OFF OR FALL ASLEEP WHILE SITTING AND TALKING TO SOMEONE: WOULD NEVER DOZE
HOW LIKELY ARE YOU TO NOD OFF OR FALL ASLEEP IN A CAR, WHILE STOPPED FOR A FEW MINUTES IN TRAFFIC: WOULD NEVER DOZE
HOW LIKELY ARE YOU TO NOD OFF OR FALL ASLEEP WHILE WATCHING TV: HIGH CHANCE OF DOZING
HOW LIKELY ARE YOU TO NOD OFF OR FALL ASLEEP WHILE SITTING AND READING: SLIGHT CHANCE OF DOZING
HOW LIKELY ARE YOU TO NOD OFF OR FALL ASLEEP WHILE LYING DOWN TO REST IN THE AFTERNOON WHEN CIRCUMSTANCES PERMIT: HIGH CHANCE OF DOZING

## 2024-01-08 ENCOUNTER — HOSPITAL ENCOUNTER (OUTPATIENT)
Dept: CARDIAC REHAB | Facility: OTHER | Age: 69
Discharge: HOME | End: 2024-01-08
Attending: STUDENT IN AN ORGANIZED HEALTH CARE EDUCATION/TRAINING PROGRAM
Payer: MEDICARE

## 2024-01-08 PROCEDURE — 93798 PHYS/QHP OP CAR RHAB W/ECG: CPT

## 2024-01-08 NOTE — PROGRESS NOTES
01/05/24 1453   Reason For Your Visit   Please briefly describe the main reason(s) for your sleep visit Concern re drop in blood oxygen level while in [facility] ICU following R carotid artery angioplasty with stent.   RT Pulse Oximetry, Overnight done and on file via access from [facility] in [place].   Approximately when did this problem start Unknown until asked in [facility] ICU on 5/1/23 if I had ever been told I had sleep apnea.   What are your goals for this visit To find out if I really have sleep apnea and/or need a C Pap machine.   Time in Bed - Work Or School Days   Do you work or go to school No   What time do you usually get into bed 10:30 p.m.   About how long does it take you to fall asleep Usually less than 10 minutes.   How often do you have trouble falling asleep 1-2/week   How often do you wake up during the night Varies but maybe 4-5 times per night, most often when rolling over or needing to use bathroom.   What wakes you up at night Use the bathroom;Other   Please elaborate Rolling from side to side.   How often do you have trouble falling back to sleep Rarely. < 1X/week.   About how long does it take to fall back to sleep Usually less than a minute.   What do you usually do if you have trouble getting back to sleep If in the middle of the night and lying awake for more than 15 minutes, I get up and read until I m tired enough to fall back asleep.   What time do you usually get out of bed to start your day The goal is 6:00 a.m. but not uncommon to wake up between 4:30 and 5:30.   Do you use an alarm No   Time in Bed - Weekends/Non-work Days/All Other Days   What time do you usually get into bed I m retired so not much difference between weekends and weekdays. I usually shoot for 10:30 p.m. but occasionally will stay up later.   About how long does it take you to fall asleep Less than 10 minutes, usually right away.   What time do you usually get out of bed to start your day Same response given earlier. Goal  is 6:00 a.m.   Do you use an alarm No   Sleep Need   On average, about how much sleep do you think you get The sleep walker on my smart watch indicates an average of 6 hours & 11 minutes over the past month.   About how much sleep do you think you need 8 hours +   Sleep Position   Which sleep positions do you prefer Side   Do you do any of the following activities in bed Read   How often do you take a nap on purpose 1-2 days per week.   About how long are your naps Keep them to no longer than 25 minutes.   Do you feel better after naps Yes   How often do you doze off unintentionally 4-5 days per week.   Have you ever had a driving accident or near-miss due to sleepiness/drowsiness No   Sleep Disruptions - Breathing/Snoring   Do you snore No   Do other people complain about your snoring No   Have you been told you stop breathing in your sleep No   Sleep Disruptions - Movement   Do you get pain, discomfort, with an urge to move Yes   Does it happen when you are resting Yes   Does it get better if you move around Yes   Does it happen more at night No   Have you been told you kick your legs at night No   Sleep Disruptions - Behaviours in Sleep   Do you ever experience sudden muscle weakness during the day No   4) Is there anything else you would like your sleep provider to know Discomfort of rotator cuff tear in left shoulder.   Caffeine, Alcohol and Other Substances   How many caffeinated beverages (coffee, tea, soda, energy drinks) per day 3-4   What time of day is your last caffeine use Before noon.   List any prescribed or over the counter stimulants that you take None.   List any prescribed or over the counter sleep medication you take Only NyQuill or equivalent when I have a cold or fever.   List previous sleep medications you have tried Melatonin   Do you drink alcohol to help you sleep No   Do you drink alcohol near bedtime No   Family History   Has any family member been diagnosed with a sleep disorder Yes   If  yes, please indicate Two of my younger brothers.  Both now use C Pap machines.   In the last TWO WEEKS have you experienced any of the following symptoms?   Fevers No   Night Sweats No   Weight Gain No   Pain at Night Yes   Double Vision No   Changes in Vision No   Difficulty Breathing through Nose No   Sore Throat in Morning No   Dry Mouth in the Morning No   Shortness of Breath Lying Flat No   Shortness of Breath With Activity No   Awakening with Shortness of Breath No   Increased Cough No   Heart Racing at Night No   Swelling in Feet or Legs Yes   Diarrhea at Night No   Heartburn at Night No   Urinating More than Once at Night No   Losing Control of Urine at Night No   Joint Pains at Night Yes   Headaches in Morning No   Weakness in Arms or Legs Yes   Depressed Mood No   Anxiety No         1/5/2024     3:03 PM    [place] Sleepiness Scale ( ORTIZ Rojo  [id number]<br>ESS - [place]/English - Final version - 21 Nov 07 - [facility] [place].)   Sitting and reading Slight chance of dozing   Watching TV High chance of dozing   Sitting, inactive in a public place (e.g. a theatre or a meeting) Would never doze   As a passenger in a car for an hour without a break Would never doze   Lying down to rest in the afternoon when circumstances permit High chance of dozing   Sitting and talking to someone Would never doze   Sitting quietly after a lunch without alcohol Slight chance of dozing   In a car, while stopped for a few minutes in traffic Would never doze   [place] Score (MC) 8   [place] Score (Sleep) 8         1/5/2024     2:57 PM   Insomnia Severity Index (MALINI)   Difficulty falling asleep 1   Difficulty staying asleep 1   Problems waking up too early 2   How SATISFIED/DISSATISFIED are you with your CURRENT sleep pattern? 3   How NOTICEABLE to others do you think your sleep problem is in terms of impairing the quality of your life? 1   How WORRIED/DISTRESSED are you about your current sleep problem? 2   To what extent do  you consider your sleep problem to INTERFERE with your daily functioning (e.g. daytime fatigue, mood, ability to function at work/daily chores, concentration, memory, mood, etc.) CURRENTLY? 2   MALINI Total Score 12         1/5/2024     3:00 PM   STOP BANG Questionnaire (  2008, the American Society of Anesthesiologists, Inc. [employer]   1. Snoring - Do you snore loudly (louder than talking or loud enough to be heard through closed doors)? No   2. Tired - Do you often feel tired, fatigued, or sleepy during daytime? Yes   3. Observed - Has anyone observed you stop breathing during your sleep? No   4. Blood pressure - Do you have or are you being treated for high blood pressure? Yes   5. BMI - BMI more than 35 kg/m2? No   6. Age - Age over 50 yr old? Yes   7. Neck circumference - Neck circumference greater than 40 cm? Yes   8. Gender - Gender male? Yes   STOP BANG Score (MC): 4 (High risk of ANGEL)

## 2024-01-10 ENCOUNTER — HOSPITAL ENCOUNTER (OUTPATIENT)
Dept: CARDIAC REHAB | Facility: OTHER | Age: 69
Discharge: HOME | End: 2024-01-10
Attending: STUDENT IN AN ORGANIZED HEALTH CARE EDUCATION/TRAINING PROGRAM
Payer: MEDICARE

## 2024-01-10 PROCEDURE — 93798 PHYS/QHP OP CAR RHAB W/ECG: CPT

## 2024-01-10 NOTE — TELEPHONE ENCOUNTER
Chart review prior to sleep testing.    Patient Summary:  68 year old male who is referred for sleep-disordered breathing.    Patient Active Problem List    Diagnosis Date Noted    Sleep apnea, unspecified type 01/04/2024     Priority: Medium    Essential hypertension 11/04/2023     Priority: Medium    Coronary artery disease involving autologous artery coronary bypass graft without angina pectoris 11/04/2023     Priority: Medium    Disorder involving thrombocytopenia (H24) 11/01/2023     Priority: Medium    Rheumatoid arthritis involving multiple sites with positive rheumatoid factor (H) 10/29/2023     Priority: Medium    S/P CABG x 3 (9/22/2023) 10/11/2023     Priority: Medium    History of coronary artery stent placement (2012) 10/11/2023     Priority: Medium    S/P patent foramen ovale closure (9/22/2023) 10/11/2023     Priority: Medium    Hyperlipidemia LDL goal <70 10/11/2023     Priority: Medium    Postoperative atrial fibrillation (H) 09/22/2023     Priority: Medium    Obstructive sleep apnea syndrome in adult 09/18/2023     Priority: Medium    Patent foramen ovale 09/18/2023     Priority: Medium    Personal history of tobacco use, presenting hazards to health 09/18/2023     Priority: Medium    Chronic left shoulder pain 09/14/2023     Priority: Medium    Traumatic incomplete tear of left rotator cuff, initial encounter 09/14/2023     Priority: Medium    Chronic anticoagulation 05/08/2023     Priority: Medium    Internal carotid artery stent present 05/08/2023     Priority: Medium    H/O amaurosis fugax 05/08/2023     Priority: Medium    Presence of other vascular implants and grafts 05/08/2023     Priority: Medium    History of right common carotid artery stent placement 05/02/2023     Priority: Medium    CKD (chronic kidney disease) stage 2, GFR 60-89 ml/min 12/14/2022     Priority: Medium    Degeneration of intervertebral disc 02/01/2018     Priority: Medium     Overview:   With back pain       "Psychosexual dysfunction with inhibited sexual excitement 02/01/2018     Priority: Medium    Hypercholesterolemia 02/01/2018     Priority: Medium    Prediabetes 02/01/2018     Priority: Medium    Diverticulosis of sigmoid colon 01/25/2016     Priority: Medium    Health care maintenance 01/21/2016     Priority: Medium    Coronary atherosclerosis 09/08/2013     Priority: Medium     Overview:   August 2012 PRAVIN stent placed at [facility] in [place]. Moderate diffuse disease in LAD and Circumflex.       Hip pain 07/03/2012     Priority: Medium    Tick bite 04/25/2011     Priority: Medium       Current Outpatient Medications   Medication    acetaminophen (TYLENOL) 500 MG tablet    aspirin 81 MG EC tablet    blood glucose (NO BRAND SPECIFIED) test strip    blood glucose monitoring (ACCU-CHEK ENEDINA PLUS) meter device kit    blood glucose monitoring (ACCU-CHEK MULTICLIX) lancets    cholecalciferol (VITAMIN D3) 125 mcg (5000 units) capsule    furosemide (LASIX) 20 MG tablet    lisinopril (ZESTRIL) 2.5 MG tablet    metoprolol succinate ER (TOPROL XL) 25 MG 24 hr tablet    Multiple Vitamins-Minerals (MULTIVITAMIN & MINERAL PO)    nitroGLYcerin (NITROSTAT) 0.4 MG sublingual tablet    potassium chloride ER (KLOR-CON M) 10 MEQ CR tablet    rosuvastatin (CRESTOR) 40 MG tablet    sildenafil (VIAGRA) 50 MG tablet     No current facility-administered medications for this visit.       Pertinent PMHx of ANGEL, HLD, pre-diabetes, CAD s/p CABG x3, HTN, rheumatoid arthritis.    I did find one CRISTAL performed at [facility] on 5/1/2023 on oxygen at 1 LPM.  Mean SpO2 95%, jass 71% with repetitive desats suggestive of ANGEL.    STOP-BANG score of 4, with unknown neck circumference.  [place] score of 8.  MALINI: 12    BMI of Estimated body mass index is 32.62 kg/m  as calculated from the following:    Height as of an earlier encounter on 1/4/24: 1.784 m (5' 10.25\").    Weight as of an earlier encounter on 1/4/24: 103.9 kg (229 lb).     Chief concern per " "questionnaire: \"Concern re drop in blood oxygen level while in [facility] ICU following R carotid artery angioplasty with stent. RT Pulse Oximetry, Overnight done and on file via access from [facility] in MyChart. \"    Duration of symptoms:  \"Unknown until asked in [facility] ICU on 5/1/23 if I had ever been told I had sleep apnea. \"    Goals for visit per questionnaire: \"To find out if I really have sleep apnea and/or need a C Pap machine. \"    Sleep pattern:  Workdays.  10:30pm to 4:30-5:30am with goal of 6am  Weekends.  similar.  Time to fall asleep: ~10 minutes.  Awakenings: 4-5 times per night, 15 minutes to return to sleep.  Average total sleep time:  6.25 hours  Napping. 1-2 days per week, ? hours per nap.    No for RLS screen.  No for sleep walking.  No for dream enactment behavior.  No for bruxism.    No for morning headaches.  No for snoring.  No for observed apnea.  Yes for FHx of ANGEL - 2x brothers.    Caffeine use:  Yes for 3+ per day.  No for within 6 hours of bed.    A/P:    1.)  High likelihood of ANGEL with STOP-BANG score of 4.   - Would appear to be candidate for either home sleep testing or in-lab PSG.    ---  This note was written with the assistance of the Dragon voice-dictation technology software. The final document, although reviewed, may contain errors. For corrections, please contact the office.    Jamaal Valdivia MD    Sleep Medicine  [place], [place]  Main Office: [phone]  [facility] [place]  [address] [place] [address], [id number]  Schedule visits: [phone]  Main Office: [phone]  Fax: [phone]    "

## 2024-01-12 ENCOUNTER — HOSPITAL ENCOUNTER (OUTPATIENT)
Dept: CARDIAC REHAB | Facility: OTHER | Age: 69
Discharge: HOME | End: 2024-01-12
Attending: STUDENT IN AN ORGANIZED HEALTH CARE EDUCATION/TRAINING PROGRAM
Payer: MEDICARE

## 2024-01-12 ENCOUNTER — TELEPHONE (OUTPATIENT)
Dept: PULMONOLOGY | Facility: OTHER | Age: 69
End: 2024-01-12

## 2024-01-12 PROCEDURE — 93798 PHYS/QHP OP CAR RHAB W/ECG: CPT

## 2024-01-15 ENCOUNTER — THERAPY VISIT (OUTPATIENT)
Dept: PHYSICAL THERAPY | Facility: OTHER | Age: 69
End: 2024-01-15
Attending: PHYSICIAN ASSISTANT
Payer: MEDICARE

## 2024-01-15 ENCOUNTER — HOSPITAL ENCOUNTER (OUTPATIENT)
Dept: CARDIAC REHAB | Facility: OTHER | Age: 69
Discharge: HOME | End: 2024-01-15
Attending: PHYSICIAN ASSISTANT
Payer: MEDICARE

## 2024-01-15 DIAGNOSIS — G89.29 CHRONIC LEFT SHOULDER PAIN: ICD-10-CM

## 2024-01-15 DIAGNOSIS — M25.512 CHRONIC LEFT SHOULDER PAIN: ICD-10-CM

## 2024-01-15 DIAGNOSIS — S46.012A TRAUMATIC INCOMPLETE TEAR OF LEFT ROTATOR CUFF, INITIAL ENCOUNTER: ICD-10-CM

## 2024-01-15 PROCEDURE — 93798 PHYS/QHP OP CAR RHAB W/ECG: CPT

## 2024-01-15 PROCEDURE — 97110 THERAPEUTIC EXERCISES: CPT | Mod: GP,PO

## 2024-01-15 PROCEDURE — 97161 PT EVAL LOW COMPLEX 20 MIN: CPT | Mod: GP,PO,XU

## 2024-01-15 NOTE — PROGRESS NOTES
PHYSICAL THERAPY EVALUATION  Type of Visit: Evaluation    See electronic medical record for Abuse and Falls Screening details.    Subjective       Presenting condition or subjective complaint: Rotator cuff tear in left shoulder  Patient reports he injured his shoulder in March 2023 as a result of slipping and falling on ice.  Patient did have 1 session of physical therapy in the fall 2023.  Patient was unable to continue with physical therapy due to undergoing an open heart surgery with a three-vessel coronary artery bypass graft on 9/22/2023.  Patient reports she is now off all sternal precautions has been gradually increasing his activity.  Patient has been cleared by cardiology to begin physical therapy.  Patient has been completing cardiac rehab without issues.  Patient did return to performing the home exercise program which was provided to him at his initial physical therapy visit in the fall 2023.  He reports his exercises are going well at this time.  He continues to have pain during daily activities when performing reaching and lifting.  Date of onset:  (March 2023.)    Relevant medical history: Arthritis; Heart problems; High blood pressure; Implanted device; Overweight; Progressive neurological deficits; Sleep disorder like apnea   Dates & types of surgery: Listed in e-check in    Prior diagnostic imaging/testing results: MRI     Prior therapy history for the same diagnosis, illness or injury: No      Prior Level of Function  No prior functional limitations reported.    Living Environment  Social support: With a significant other or spouse   Type of home: House; Multi-level   Stairs to enter the home: Yes 3 Is there a railing: No   Ramp: No   Stairs inside the home: Yes 14 Is there a railing: Yes   Help at home: Self Cares (home health aide/personal care attendant, family, etc); Home management tasks (cooking, cleaning); Medication and/or finances; Home and Yard maintenance tasks  Equipment owned:  Straight Cane; Walker; Crutches; Bath bench     Employment: No    Hobbies/Interests: Fishing, hunting, hiking, reading    Patient goals for therapy: Full range of motion.         Objective   SHOULDER EVALUATION  PAIN: Pain Level at Rest: 2/10  Pain Level with Use: 9/10  Pain Location: Anterior/lateral left shoulder  Pain Quality: Aching  Pain Frequency: intermittent  Pain is Exacerbated By: Certain positions, reaching overhead, lifting  Pain is Relieved By: otc medications  INTEGUMENTARY (edema, incisions): WNL  POSTURE:  Mild rounded shoulder posture in sitting.    ROM:   Left shoulder range of motion:  Flexion: 135 degrees-painful arc reported  Abduction: 105 degrees-limited by pain  External rotation: Reaching behind head to C7 bilaterally-pain reported on the left.  Internal rotation: Reaching behind back to L1 bilaterally-pain reported left.      STRENGTH:   Left shoulder strength:  Flexion: 4+/5  Abduction: 4/5  External rotation: 4/5  Internal rotation: 5 -/5      SPECIAL TESTS: Barton and Neer test positive left shoulder.  PALPATION: General tenderness to palpation over the anterior shoulder along the biceps tendon pathway and over the lateral shoulder at the insertion of the rotator cuff tendons.    Assessment & Plan   CLINICAL IMPRESSIONS  Medical Diagnosis: Chronic left shoulder pain (M25.512, G89.29),    Treatment Diagnosis: Impaired range of motion, weakness, left shoulder pain, impingement, postural dysfunction   Impression/Assessment: Patient is a 68 year old male with left shoulder pain complaints.  The following significant findings have been identified: Pain, Decreased ROM/flexibility, Decreased joint mobility, Decreased strength, Impaired muscle performance, Decreased activity tolerance, and Impaired posture. These impairments interfere with their ability to perform self care tasks, work tasks, recreational activities, and household chores as compared to previous level of function.     Clinical  Decision Making (Complexity):  Clinical Presentation: Stable/Uncomplicated  Clinical Presentation Rationale: based on medical and personal factors listed in PT evaluation  Clinical Decision Making (Complexity): Low complexity    PLAN OF CARE  Treatment Interventions:  Modalities: Cryotherapy, Vasoneumatic Device  Interventions: Manual Therapy, Neuromuscular Re-education, Therapeutic Exercise    Long Term Goals     PT Goal 1  Goal Identifier: Left shoulder pain  Goal Description: Patient will return to daily lifting and ADL activities such as reaching behind the back without left shoulder pain to allow tucking his shirt without difficulty.  Rationale: to maximize safety and independence with performance of ADLs and functional tasks;to maximize safety and independence within the home;to maximize safety and independence with self cares  Target Date: 03/12/24  PT Goal 2  Goal Identifier: Left shoulder range of motion  Goal Description: Patient will improve left shoulder flexion to 140 degrees or greater to allow reaching to an overhead shelf without difficulty.  Rationale: to maximize safety and independence with performance of ADLs and functional tasks;to maximize safety and independence within the home;to maximize safety and independence within the community  Target Date: 03/12/24  PT Goal 3  Goal Identifier: Left shoulder weakness  Goal Description: Patient will improve left shoulder strength by one half grade or greater to allow lifting a 2 pound object on overhead shelf without difficulty or limitation.  Rationale: to maximize safety and independence with performance of ADLs and functional tasks;to maximize safety and independence within the home;to maximize safety and independence within the community  Target Date: 03/12/24      Frequency of Treatment: 16 visits  Duration of Treatment: 8 weeks      Education Assessment:   Learner/Method: Patient;Listening;Reading;Demonstration;Pictures/Video;No Barriers to  Learning    Risks and benefits of evaluation/treatment have been explained.   Patient/Family/caregiver agrees with Plan of Care.     Evaluation Time:     PT Eval, Low Complexity Minutes (36837): 15       Signing Clinician: ALON Ponce [facility]                                                                                   OUTPATIENT PHYSICAL THERAPY      PLAN OF TREATMENT FOR OUTPATIENT REHABILITATION   Patient's Last Name, First Name, M.IMickey  DoraKirk YOB: 1955   Provider's Name   [facility]   Medical Record No.  [id number]     Onset Date:  (March 2023.)  Start of Care Date: 01/15/24     Medical Diagnosis:  Chronic left shoulder pain (M25.512, G89.29),      PT Treatment Diagnosis:  Impaired range of motion, weakness, left shoulder pain, impingement, postural dysfunction Plan of Treatment  Frequency/Duration: 16 visits/ 8 weeks    Certification date from 01/15/24 to 03/12/24         See note for plan of treatment details and functional goals     Graham Goode, PT                         I CERTIFY THE NEED FOR THESE SERVICES FURNISHED UNDER        THIS PLAN OF TREATMENT AND WHILE UNDER MY CARE     (Physician attestation of this document indicates review and certification of the therapy plan).              Referring Provider:  Erica Hackett    Initial Assessment  See Epic Evaluation- Start of Care Date: 01/15/24                 13-Mar-2018

## 2024-01-17 ENCOUNTER — THERAPY VISIT (OUTPATIENT)
Dept: PHYSICAL THERAPY | Facility: OTHER | Age: 69
End: 2024-01-17
Attending: PHYSICIAN ASSISTANT
Payer: MEDICARE

## 2024-01-17 ENCOUNTER — HOSPITAL ENCOUNTER (OUTPATIENT)
Dept: CARDIAC REHAB | Facility: OTHER | Age: 69
Discharge: HOME | End: 2024-01-17
Attending: PHYSICIAN ASSISTANT
Payer: MEDICARE

## 2024-01-17 DIAGNOSIS — S46.012A TRAUMATIC INCOMPLETE TEAR OF LEFT ROTATOR CUFF, INITIAL ENCOUNTER: ICD-10-CM

## 2024-01-17 DIAGNOSIS — G89.29 CHRONIC LEFT SHOULDER PAIN: Primary | ICD-10-CM

## 2024-01-17 DIAGNOSIS — M25.512 CHRONIC LEFT SHOULDER PAIN: Primary | ICD-10-CM

## 2024-01-17 PROCEDURE — 97110 THERAPEUTIC EXERCISES: CPT | Mod: GP,PO

## 2024-01-17 PROCEDURE — 93798 PHYS/QHP OP CAR RHAB W/ECG: CPT

## 2024-01-17 PROCEDURE — 97016 VASOPNEUMATIC DEVICE THERAPY: CPT | Mod: GP,PO

## 2024-01-19 ENCOUNTER — HOSPITAL ENCOUNTER (OUTPATIENT)
Dept: CARDIAC REHAB | Facility: OTHER | Age: 69
Discharge: HOME | End: 2024-01-19
Attending: PHYSICIAN ASSISTANT
Payer: MEDICARE

## 2024-01-19 PROCEDURE — 93798 PHYS/QHP OP CAR RHAB W/ECG: CPT

## 2024-01-24 ENCOUNTER — HOSPITAL ENCOUNTER (OUTPATIENT)
Dept: CARDIAC REHAB | Facility: OTHER | Age: 69
Discharge: HOME | End: 2024-01-24
Attending: PHYSICIAN ASSISTANT
Payer: MEDICARE

## 2024-01-24 PROCEDURE — 93798 PHYS/QHP OP CAR RHAB W/ECG: CPT

## 2024-01-26 ENCOUNTER — HOSPITAL ENCOUNTER (OUTPATIENT)
Dept: CARDIAC REHAB | Facility: OTHER | Age: 69
Discharge: HOME | End: 2024-01-26
Attending: PHYSICIAN ASSISTANT
Payer: MEDICARE

## 2024-01-26 PROCEDURE — 93798 PHYS/QHP OP CAR RHAB W/ECG: CPT

## 2024-01-29 ENCOUNTER — HOSPITAL ENCOUNTER (OUTPATIENT)
Dept: CARDIAC REHAB | Facility: OTHER | Age: 69
Discharge: HOME | End: 2024-01-29
Attending: STUDENT IN AN ORGANIZED HEALTH CARE EDUCATION/TRAINING PROGRAM
Payer: MEDICARE

## 2024-01-29 PROCEDURE — 93798 PHYS/QHP OP CAR RHAB W/ECG: CPT

## 2024-01-31 ENCOUNTER — HOSPITAL ENCOUNTER (OUTPATIENT)
Dept: CARDIAC REHAB | Facility: OTHER | Age: 69
Discharge: HOME | End: 2024-01-31
Attending: STUDENT IN AN ORGANIZED HEALTH CARE EDUCATION/TRAINING PROGRAM
Payer: MEDICARE

## 2024-01-31 ENCOUNTER — TELEPHONE (OUTPATIENT)
Dept: CARDIAC REHAB | Facility: OTHER | Age: 69
End: 2024-01-31
Payer: COMMERCIAL

## 2024-01-31 DIAGNOSIS — I25.810 CORONARY ARTERY DISEASE INVOLVING AUTOLOGOUS ARTERY CORONARY BYPASS GRAFT WITHOUT ANGINA PECTORIS: ICD-10-CM

## 2024-01-31 PROCEDURE — 93798 PHYS/QHP OP CAR RHAB W/ECG: CPT

## 2024-01-31 RX ORDER — LISINOPRIL 5 MG/1
5 TABLET ORAL DAILY
Qty: 90 TABLET | Refills: 1 | Status: SHIPPED | OUTPATIENT
Start: 2024-01-31 | End: 2024-06-06

## 2024-01-31 NOTE — TELEPHONE ENCOUNTER
Please call patient and advise him to increase Lisinopril to 5 mg daily, take in the morning.   TESSIE Cuevas CNP

## 2024-01-31 NOTE — TELEPHONE ENCOUNTER
Called patient with the medication change as noted.  Let him know that the rx was sent to his pharmacy.  Fariba Howell LPN .......1/31/2024 5:00 PM

## 2024-01-31 NOTE — TELEPHONE ENCOUNTER
Pt blood pressure readings have been elevated on arrival at cardiac rehab for approximately 2 weeks. See blood pressure summary readings given to Francine BUTLER for your review.  Today resting BP before exercise 196/101, rechecked BP 5 minutes later after resting 162/89.  No difference between right or left arm.  Weight has remained stable at 228lbs.    He stopped taking his lasix 20 mg every day approximately 2 weeks ago.  Minimal left lower leg swelling has remained unchanged since stopping lasix.    Today is his final day in Cardiac rehab and he has a RTC with Corazon Dawkins NP on 6/13/24.   Please advise.

## 2024-02-06 ENCOUNTER — THERAPY VISIT (OUTPATIENT)
Dept: PHYSICAL THERAPY | Facility: OTHER | Age: 69
End: 2024-02-06
Attending: STUDENT IN AN ORGANIZED HEALTH CARE EDUCATION/TRAINING PROGRAM
Payer: MEDICARE

## 2024-02-06 DIAGNOSIS — G89.29 CHRONIC LEFT SHOULDER PAIN: Primary | ICD-10-CM

## 2024-02-06 DIAGNOSIS — M25.512 CHRONIC LEFT SHOULDER PAIN: Primary | ICD-10-CM

## 2024-02-06 DIAGNOSIS — S46.012A TRAUMATIC INCOMPLETE TEAR OF LEFT ROTATOR CUFF, INITIAL ENCOUNTER: ICD-10-CM

## 2024-02-06 PROCEDURE — 97110 THERAPEUTIC EXERCISES: CPT | Mod: GP,PO

## 2024-02-06 PROCEDURE — 97140 MANUAL THERAPY 1/> REGIONS: CPT | Mod: GP,PO

## 2024-02-09 ENCOUNTER — THERAPY VISIT (OUTPATIENT)
Dept: PHYSICAL THERAPY | Facility: OTHER | Age: 69
End: 2024-02-09
Attending: PHYSICIAN ASSISTANT
Payer: MEDICARE

## 2024-02-09 DIAGNOSIS — G89.29 CHRONIC LEFT SHOULDER PAIN: Primary | ICD-10-CM

## 2024-02-09 DIAGNOSIS — S46.012A TRAUMATIC INCOMPLETE TEAR OF LEFT ROTATOR CUFF, INITIAL ENCOUNTER: ICD-10-CM

## 2024-02-09 DIAGNOSIS — M25.512 CHRONIC LEFT SHOULDER PAIN: Primary | ICD-10-CM

## 2024-02-09 PROCEDURE — 97140 MANUAL THERAPY 1/> REGIONS: CPT | Mod: GP,PO

## 2024-02-10 NOTE — PROGRESS NOTES
"Kirk John is a 68 year old male who is being evaluated via in-person clinic visit.       Visit Details     In-clinic visit for prior abnormal overnight oximetry.     A/P:     1.)  High likelihood of ANGEL with STOP-BANG score of 4.  -Prior abnormal overnight oximetry at [facility] in May 2023 with KEITH ~33 and time of SpO2 below 89% of 33 minutes performed with supplemental oxygen at 1 L/min.    Given that he is now status post carotid endarterectomy, coronary artery bypass graft and interim weight loss of 30-40 pounds, I do suspect that this has improved.    Given his significant cardiovascular history, I do feel it is appropriate for formal sleep apnea testing.  Plan to proceed with WatchPAT home sleep test, orders placed today.    SUBJECTIVE:  Kirk John is a 68 year old male.    68 year old male who is referred for sleep-disordered breathing.     Pertinent PMHx of ANGEL, HLD, pre-diabetes, CAD s/p CABG x3, HTN, rheumatoid arthritis.     I did find one CRISTAL performed at [facility] on 5/1/2023 on oxygen at 1 LPM.  Mean SpO2 95%, jass 71% with repetitive desats suggestive of ANGEL.     STOP-BANG score of 4, with unknown neck circumference.  [place] score of 8.  MALINI: 12     BMI of Estimated body mass index is 32.62 kg/m  as calculated from the following:    Height as of an earlier encounter on 1/4/24: 1.784 m (5' 10.25\").    Weight as of an earlier encounter on 1/4/24: 103.9 kg (229 lb).      Today -we reviewed his interim history, including his time at [facility] around the time of TIA, carotid endarterectomy, and subsequent coronary artery bypass graft.  He was not discharged on oxygen that he recalls.    Since his interim weight loss, his snoring is very intermittent, no observed apnea.    Chief concern per questionnaire: \"Concern re drop in blood oxygen level while in [facility] ICU following R carotid artery angioplasty with stent. RT Pulse Oximetry, Overnight done and on file via access from [facility] in [employer]. \"   " "  Duration of symptoms:  \"Unknown until asked in [facility] ICU on 5/1/23 if I had ever been told I had sleep apnea. \"     Goals for visit per questionnaire: \"To find out if I really have sleep apnea and/or need a C Pap machine. \"     Sleep pattern:  Workdays.  10:30pm to 4:30-5:30am with goal of 6am  Weekends.  similar.  Time to fall asleep: ~10 minutes.  Awakenings: 4-5 times per night, 15 minutes to return to sleep.  Average total sleep time:  6.25 hours  Napping. 1-2 days per week, ? hours per nap.     No for RLS screen.  No for sleep walking.  No for dream enactment behavior.  No for bruxism.     No for morning headaches.  No for snoring.  No for observed apnea.  Yes for FHx of ANGEL - 2x brothers.     Caffeine use:  Yes for 3+ per day.  No for within 6 hours of bed.      Past medical history:    Patient Active Problem List    Diagnosis Date Noted    Sleep apnea, unspecified type 01/04/2024     Priority: Medium    Essential hypertension 11/04/2023     Priority: Medium    Coronary artery disease involving autologous artery coronary bypass graft without angina pectoris 11/04/2023     Priority: Medium    Disorder involving thrombocytopenia (H24) 11/01/2023     Priority: Medium    Rheumatoid arthritis involving multiple sites with positive rheumatoid factor (H) 10/29/2023     Priority: Medium    S/P CABG x 3 (9/22/2023) 10/11/2023     Priority: Medium    History of coronary artery stent placement (2012) 10/11/2023     Priority: Medium    S/P patent foramen ovale closure (9/22/2023) 10/11/2023     Priority: Medium    Hyperlipidemia LDL goal <70 10/11/2023     Priority: Medium    Postoperative atrial fibrillation (H) 09/22/2023     Priority: Medium    Obstructive sleep apnea syndrome in adult 09/18/2023     Priority: Medium    Patent foramen ovale 09/18/2023     Priority: Medium    Personal history of tobacco use, presenting hazards to health 09/18/2023     Priority: Medium    Chronic left shoulder pain 09/14/2023     " Priority: Medium    Traumatic incomplete tear of left rotator cuff, initial encounter 09/14/2023     Priority: Medium    Chronic anticoagulation 05/08/2023     Priority: Medium    Internal carotid artery stent present 05/08/2023     Priority: Medium    H/O amaurosis fugax 05/08/2023     Priority: Medium    Presence of other vascular implants and grafts 05/08/2023     Priority: Medium    History of right common carotid artery stent placement 05/02/2023     Priority: Medium    CKD (chronic kidney disease) stage 2, GFR 60-89 ml/min 12/14/2022     Priority: Medium    Degeneration of intervertebral disc 02/01/2018     Priority: Medium     Overview:   With back pain      Psychosexual dysfunction with inhibited sexual excitement 02/01/2018     Priority: Medium    Hypercholesterolemia 02/01/2018     Priority: Medium    Prediabetes 02/01/2018     Priority: Medium    Diverticulosis of sigmoid colon 01/25/2016     Priority: Medium    Health care maintenance 01/21/2016     Priority: Medium    Coronary atherosclerosis 09/08/2013     Priority: Medium     Overview:   August 2012 PRAVIN stent placed at [facility] in [place]. Moderate diffuse disease in LAD and Circumflex.       Hip pain 07/03/2012     Priority: Medium    Tick bite 04/25/2011     Priority: Medium       10 point ROS of systems including Constitutional, Eyes, Respiratory, Cardiovascular, Gastroenterology, Genitourinary, Integumentary, Muscularskeletal, Psychiatric were all negative except for pertinent positives noted in my HPI.    Current Outpatient Medications   Medication Sig Dispense Refill    acetaminophen (TYLENOL) 500 MG tablet Take 500-1,000 mg by mouth every 6 hours as needed for mild pain Take at bedtime as needed for shoulder pain      aspirin 81 MG EC tablet Take 81 mg by mouth daily      blood glucose (NO BRAND SPECIFIED) test strip Use to test blood sugar 1 times daily. Dx: R73.03 100 strip 3    blood glucose monitoring (ACCU-CHEK ENEDINA PLUS) meter device kit Use to  test blood sugar 1 times daily. Dx: R73.03 1 kit 0    blood glucose monitoring (ACCU-CHEK MULTICLIX) lancets Use to test blood sugar 1 times daily. Dx: R73.03 102 each 3    cholecalciferol (VITAMIN D3) 125 mcg (5000 units) capsule Take 125 mcg by mouth daily      lisinopril (ZESTRIL) 5 MG tablet Take 1 tablet (5 mg) by mouth daily 90 tablet 1    metoprolol succinate ER (TOPROL XL) 25 MG 24 hr tablet Take 0.5 tablets (12.5 mg) by mouth daily 45 tablet 3    Multiple Vitamins-Minerals (MULTIVITAMIN & MINERAL PO) Take 1 tablet by mouth      nitroGLYcerin (NITROSTAT) 0.4 MG sublingual tablet PLACE 1 TABLET UNDER THE TONGUE EVERY 5 MINUTES IF NEEDED FOR CHEST PAIN. 25 tablet 0    potassium chloride ER (KLOR-CON M) 10 MEQ CR tablet Take 20 mq (2 tabs) on days you take lasix 120 tablet 1    rosuvastatin (CRESTOR) 40 MG tablet Take 40 mg by mouth daily      sildenafil (VIAGRA) 50 MG tablet Take 1 tablet (50 mg) by mouth daily as needed (Erectile dysfunction) 10 tablet 11       OBJECTIVE:  There were no vitals taken for this visit.    Physical Exam     ---  This note was written with the assistance of the Dragon voice-dictation technology software. The final document, although reviewed, may contain errors. For corrections, please contact the office.    Total time spent preparing to see the patient, review of chart, obtaining history and physical examination, review of sleep testing, review of treatment options, education, discussion with patient and documenting in Epic / EMR was 25 minutes.  All time involved was spent on the day of service for the patient (the same day as the patient's appointment).    Jamaal Valdivia MD    Sleep Medicine  [facility]  - [place], [place]  Main Office: [phone]  [facility] [place]  [address] [place] [address], [address]  Schedule visits: [phone]  Main  Office: [phone]  Fax: [phone]    Answers submitted by the patient for this visit:  General Questionnaire (Submitted on 2/7/2024)  Chief Complaint: Chronic problems general questions HPI Form  How many servings of fruits and vegetables do you eat daily?: 4 or more  On average, how many sweetened beverages do you drink each day (Examples: soda, juice, sweet tea, etc.  Do NOT count diet or artificially sweetened beverages)?: 0  How many minutes a day do you exercise enough to make your heart beat faster?: 30 to 60  How many days a week do you exercise enough to make your heart beat faster?: 6  How many days per week do you miss taking your medication?: 0  General Concern (Submitted on 2/7/2024)  Chief Complaint: Chronic problems general questions HPI Form  What is the reason for your visit today?: Discuss possible sleep apnea  When did your symptoms begin?: More than a month  What are your symptoms?: Irregular sleep patterns, possible dips in blood oxygen level  How would you describe these symptoms?: Mild  Are your symptoms:: Improving  Have you had these symptoms before?: Yes  Have you tried or received treatment for these symptoms before?: Yes  Did that treatment work? : Yes  Please describe the treatment you had:: Oxygen cannulas while at [facility]

## 2024-02-12 ENCOUNTER — MYC MEDICAL ADVICE (OUTPATIENT)
Dept: FAMILY MEDICINE | Facility: OTHER | Age: 69
End: 2024-02-12

## 2024-02-12 ENCOUNTER — OFFICE VISIT (OUTPATIENT)
Dept: FAMILY MEDICINE | Facility: OTHER | Age: 69
End: 2024-02-12
Attending: FAMILY MEDICINE
Payer: COMMERCIAL

## 2024-02-12 VITALS
SYSTOLIC BLOOD PRESSURE: 118 MMHG | HEIGHT: 70 IN | TEMPERATURE: 96.8 F | OXYGEN SATURATION: 98 % | DIASTOLIC BLOOD PRESSURE: 79 MMHG | WEIGHT: 229.4 LBS | RESPIRATION RATE: 20 BRPM | HEART RATE: 65 BPM | BODY MASS INDEX: 32.84 KG/M2

## 2024-02-12 DIAGNOSIS — M67.814 TENDINOSIS OF LEFT SHOULDER: ICD-10-CM

## 2024-02-12 DIAGNOSIS — R06.83 SNORING: Primary | ICD-10-CM

## 2024-02-12 DIAGNOSIS — I10 ESSENTIAL HYPERTENSION: ICD-10-CM

## 2024-02-12 DIAGNOSIS — Z95.1 S/P CABG X 3: ICD-10-CM

## 2024-02-12 DIAGNOSIS — M75.102 TEAR OF LEFT SUPRASPINATUS TENDON: Primary | ICD-10-CM

## 2024-02-12 PROCEDURE — 99213 OFFICE O/P EST LOW 20 MIN: CPT | Performed by: FAMILY MEDICINE

## 2024-02-12 PROCEDURE — G0463 HOSPITAL OUTPT CLINIC VISIT: HCPCS

## 2024-02-12 ASSESSMENT — PAIN SCALES - GENERAL: PAINLEVEL: NO PAIN (0)

## 2024-02-12 NOTE — NURSING NOTE
Sleep Medicine Clinic Rooming Note    Patient was identified appropriately by name and date of birth.    Medication Reconciliation: complete    Chief complaint: Consult       Height: 5'10.25 ft/inches  Weight: 229.4 lbs  SpO2: 98  HR: 65  BP: 150/82  Neck circumference:  inches     [place]coe Sleepiness Scale    How likely are you to doze off or fall asleep in the following situations, in contrast to just feeling tired?    This refers to your usual way of life recently.    Even if you haven't done some of these things recently, try to figure out how they would have affected you.    Use the following scale to choose the most appropriate number for each situation:  0 = NO CHANCE of dozing  1 = SLIGHT CHANCE of dozing  2 = MODERATE CHANCE of dozing  3 = HIGH CHANCE of dozing    Sitting and Readin  Watching television: 1  Sitting inactive in a public place:0  Riding in car:0  Laying down to rest in the afternoon:2  Sitting and talking to someone:0  Sitting quietly after lunch without alcohol:0  In a car, stopped in traffic for a few minutes:0    Score: 4    DME needs:     Additional Notes:

## 2024-02-12 NOTE — PATIENT INSTRUCTIONS
We will plan to proceed with the home sleep test that is sent through the mail and worn on the finger / wrist.  I will send a summary of the results right away through [employer].  Overall, I would likely only plan for further sleep apnea treatment if we still see repetitive drops in oxygen or severe sleep apnea.    Jamaal Valdivia MD    Sleep Medicine  [place], [place]  Main Office: [phone]  [facility] [address], [address]  Schedule visits: [phone]  Main Office: [phone]  Fax: [phone]

## 2024-02-13 ENCOUNTER — THERAPY VISIT (OUTPATIENT)
Dept: PHYSICAL THERAPY | Facility: OTHER | Age: 69
End: 2024-02-13
Attending: STUDENT IN AN ORGANIZED HEALTH CARE EDUCATION/TRAINING PROGRAM
Payer: MEDICARE

## 2024-02-13 DIAGNOSIS — S46.012A TRAUMATIC INCOMPLETE TEAR OF LEFT ROTATOR CUFF, INITIAL ENCOUNTER: ICD-10-CM

## 2024-02-13 DIAGNOSIS — G89.29 CHRONIC LEFT SHOULDER PAIN: Primary | ICD-10-CM

## 2024-02-13 DIAGNOSIS — M25.512 CHRONIC LEFT SHOULDER PAIN: Primary | ICD-10-CM

## 2024-02-13 PROCEDURE — 97140 MANUAL THERAPY 1/> REGIONS: CPT | Mod: GP,PO

## 2024-02-13 PROCEDURE — 97110 THERAPEUTIC EXERCISES: CPT | Mod: GP,PO

## 2024-02-15 ENCOUNTER — THERAPY VISIT (OUTPATIENT)
Dept: PHYSICAL THERAPY | Facility: OTHER | Age: 69
End: 2024-02-15
Attending: PHYSICIAN ASSISTANT
Payer: MEDICARE

## 2024-02-15 DIAGNOSIS — G89.29 CHRONIC LEFT SHOULDER PAIN: Primary | ICD-10-CM

## 2024-02-15 DIAGNOSIS — S46.012A TRAUMATIC INCOMPLETE TEAR OF LEFT ROTATOR CUFF, INITIAL ENCOUNTER: ICD-10-CM

## 2024-02-15 DIAGNOSIS — M25.512 CHRONIC LEFT SHOULDER PAIN: Primary | ICD-10-CM

## 2024-02-15 PROCEDURE — 97016 VASOPNEUMATIC DEVICE THERAPY: CPT | Mod: GP,PO

## 2024-02-15 PROCEDURE — 97140 MANUAL THERAPY 1/> REGIONS: CPT | Mod: GP,PO

## 2024-02-15 PROCEDURE — 97110 THERAPEUTIC EXERCISES: CPT | Mod: GP,PO

## 2024-02-20 ENCOUNTER — THERAPY VISIT (OUTPATIENT)
Dept: PHYSICAL THERAPY | Facility: OTHER | Age: 69
End: 2024-02-20
Attending: PHYSICIAN ASSISTANT
Payer: MEDICARE

## 2024-02-20 DIAGNOSIS — S46.012A TRAUMATIC INCOMPLETE TEAR OF LEFT ROTATOR CUFF, INITIAL ENCOUNTER: ICD-10-CM

## 2024-02-20 DIAGNOSIS — G89.29 CHRONIC LEFT SHOULDER PAIN: Primary | ICD-10-CM

## 2024-02-20 DIAGNOSIS — M25.512 CHRONIC LEFT SHOULDER PAIN: Primary | ICD-10-CM

## 2024-02-20 PROCEDURE — 97140 MANUAL THERAPY 1/> REGIONS: CPT | Mod: GP,PO

## 2024-02-20 PROCEDURE — 97110 THERAPEUTIC EXERCISES: CPT | Mod: GP,PO

## 2024-02-22 ENCOUNTER — THERAPY VISIT (OUTPATIENT)
Dept: PHYSICAL THERAPY | Facility: OTHER | Age: 69
End: 2024-02-22
Attending: PHYSICIAN ASSISTANT
Payer: MEDICARE

## 2024-02-22 DIAGNOSIS — M25.512 CHRONIC LEFT SHOULDER PAIN: Primary | ICD-10-CM

## 2024-02-22 DIAGNOSIS — S46.012A TRAUMATIC INCOMPLETE TEAR OF LEFT ROTATOR CUFF, INITIAL ENCOUNTER: ICD-10-CM

## 2024-02-22 DIAGNOSIS — G89.29 CHRONIC LEFT SHOULDER PAIN: Primary | ICD-10-CM

## 2024-02-22 PROCEDURE — 97140 MANUAL THERAPY 1/> REGIONS: CPT | Mod: GP,PO

## 2024-02-22 PROCEDURE — 97110 THERAPEUTIC EXERCISES: CPT | Mod: GP,PO

## 2024-02-27 ENCOUNTER — THERAPY VISIT (OUTPATIENT)
Dept: PHYSICAL THERAPY | Facility: OTHER | Age: 69
End: 2024-02-27
Attending: PHYSICIAN ASSISTANT
Payer: MEDICARE

## 2024-02-27 DIAGNOSIS — M25.512 CHRONIC LEFT SHOULDER PAIN: Primary | ICD-10-CM

## 2024-02-27 DIAGNOSIS — S46.012A TRAUMATIC INCOMPLETE TEAR OF LEFT ROTATOR CUFF, INITIAL ENCOUNTER: ICD-10-CM

## 2024-02-27 DIAGNOSIS — G89.29 CHRONIC LEFT SHOULDER PAIN: Primary | ICD-10-CM

## 2024-02-27 PROCEDURE — 97110 THERAPEUTIC EXERCISES: CPT | Mod: GP,PO

## 2024-02-27 PROCEDURE — 97140 MANUAL THERAPY 1/> REGIONS: CPT | Mod: GP,PO

## 2024-02-27 NOTE — PROGRESS NOTES
Patient was provided both verbal and written education and instructions on use of Watch PAT device. Watch PAT device has been registered and shipped via [employer] on 2/27/2024. Patient was notified that package was mailed out. Watch PAT serial number: [id number]    Tracking# [id number].

## 2024-02-29 ENCOUNTER — THERAPY VISIT (OUTPATIENT)
Dept: PHYSICAL THERAPY | Facility: OTHER | Age: 69
End: 2024-02-29
Attending: PHYSICIAN ASSISTANT
Payer: MEDICARE

## 2024-02-29 ENCOUNTER — VIRTUAL VISIT (OUTPATIENT)
Dept: SLEEP MEDICINE | Facility: HOSPITAL | Age: 69
End: 2024-02-29
Attending: FAMILY MEDICINE
Payer: MEDICARE

## 2024-02-29 DIAGNOSIS — M25.512 CHRONIC LEFT SHOULDER PAIN: Primary | ICD-10-CM

## 2024-02-29 DIAGNOSIS — S46.012A TRAUMATIC INCOMPLETE TEAR OF LEFT ROTATOR CUFF, INITIAL ENCOUNTER: ICD-10-CM

## 2024-02-29 DIAGNOSIS — G47.33 OSA (OBSTRUCTIVE SLEEP APNEA): Primary | ICD-10-CM

## 2024-02-29 DIAGNOSIS — G89.29 CHRONIC LEFT SHOULDER PAIN: Primary | ICD-10-CM

## 2024-02-29 PROCEDURE — 97140 MANUAL THERAPY 1/> REGIONS: CPT | Mod: GP,PO

## 2024-02-29 NOTE — PROGRESS NOTES
02/29/24 0949   Appointment Info   Signing clinician's name / credentials Graham Walkerparas,, PT, OCS   Visits Used 10   Medical Diagnosis Chronic left shoulder pain (M25.512, G89.29),   PT Tx Diagnosis Impaired range of motion, weakness, left shoulder pain, impingement, postural dysfunction   Progress Note/Certification   Start of Care Date 01/15/24   Onset of illness/injury or Date of Surgery   (March 2023.)   Therapy Frequency 16 visits   Predicted Duration 8 weeks   Certification date from 01/15/24   Certification date to 03/12/24   PT Goal 1   Goal Identifier Left shoulder pain   Goal Description Patient will return to daily lifting and ADL activities such as reaching behind the back without left shoulder pain to allow tucking his shirt without difficulty.   Rationale to maximize safety and independence with performance of ADLs and functional tasks;to maximize safety and independence within the home;to maximize safety and independence with self cares   Goal Progress Progressing towards goal   Target Date 03/12/24   PT Goal 2   Goal Identifier Left shoulder range of motion   Goal Description Patient will improve left shoulder flexion to 140 degrees or greater to allow reaching to an overhead shelf without difficulty.   Rationale to maximize safety and independence with performance of ADLs and functional tasks;to maximize safety and independence within the home;to maximize safety and independence within the community   Target Date 03/12/24   Goal Progress Progressing towards goal   PT Goal 3   Goal Identifier Left shoulder weakness   Goal Description Patient will improve left shoulder strength by one half grade or greater to allow lifting a 2 pound object on overhead shelf without difficulty or limitation.   Rationale to maximize safety and independence with performance of ADLs and functional tasks;to maximize safety and independence within the home;to maximize safety and independence within the community   Target  Date 03/12/24   Goal Progress Progressing towards goal   Subjective Report   Subjective Report Patient states he was able to increase weight to a 8 ounce can and perform 10 reps of exercise without difficulty.   Objective Measures   Objective Measures Objective Measure 1;Objective Measure 2;Objective Measure 3   Objective Measure 1   Objective Measure Left shoulder pain   Details 0-2/10   Objective Measure 2   Objective Measure Left  shoulder ROM   Details Flexion: 140 degrees.  Abduction: 130 degrees. External rotation: Reaching behind head to C7 bilaterally.  Internal rotation: Reaching behind back to L1 bilaterally-pain reported left.   Objective Measure 3   Objective Measure Left shoulder strength   Details Flexion: 4+/5  Abduction: 4+/5  External rotation: 5 -/5  Internal rotation: 5 -/5   PT Modalities   PT Modalities Vasopneumatic device   Treatment Interventions (PT)   Interventions Therapeutic Procedure/Exercise;Manual Therapy   Therapeutic Procedure/Exercise   Therapeutic Procedures: strength, endurance, ROM, flexibillity minutes (30505) 5   Ther Proc 1 - Details UBE x 2 minutes each way.  Patient reports the 8 oz can went well. Patient will continue with yellow Thera-Band when performing internal rotation.  He will increase to an 8 ounce weight with supine shoulder flexion, supine shoulder protraction, side-lying external rotation, side-lying shoulder abduction.  Recommended he start with 10 repetitions and gradually increase to 30 as symptoms allow.  Patient was instructed to discontinue use of 8 ounce weight if increased pain occurs with any exercises.  Continue working on exercises 3 times a week.   Skilled Intervention education, HEP, range of motion, strengthening   Patient Response/Progress Patient verbalized and demonstrated progression of home exercise program and symptom management techniques.   Manual Therapy   Manual Therapy: Mobilization, MFR, MLD, friction massage minutes (22923) 25   Manual  Therapy 1 - Details STM/MFR/IASTM using GT 4 to the left upper trapezius, levator scapula, rhomboid, infraspinatus,  teres minor, lateral shoulder and and long head biceps tendon pathway..   Skilled Intervention Decrease pain, improve tissue mobility, improve range of motion   Patient Response/Progress Positive response to treatment reported.   Education   Learner/Method Patient;Listening;Reading;Demonstration;Pictures/Video;No Barriers to Learning   Plan   Home program Scap set with low trap activation x 10, 5 sec. Supine flexion starting at 90 degrees working through a pain-free arc of motion x 30. Side ER x 30. Side Abduction to 90 degrees x 30.  Progress home exercise program following a low weight high repetition program. Updated 1/17/24: Supine protraction x 10-30-Perform once a day or as symptoms allow.  Updated 2/6/2024: Recommended to perform exercises 3 days a week, and 20 reps or less at this time to assist with reduction in pain with exercises.  Updated 2/22:Standing IR with yellow x 10-30.-Perform 3 times a week or as symptoms allow.   Plan for next session Progress exercises as symptoms allow.  Manual therapy and modalities as indicated.   Total Session Time   Timed Code Treatment Minutes 30   Total Treatment Time (sum of timed and untimed services) 30         PLAN  Patient will begin a trial of independent home exercise program and symptom management techniques at this time.  Patient has an orthopedic consult with Dr. French in 4 weeks.  If additional PT appointments are suggested he will call and schedule.    Beginning/End Dates of Progress Note Reporting Period:    1/15/2024 to 02/29/2024    Referring Provider:  Erica Hackett

## 2024-03-06 ENCOUNTER — DOCUMENTATION ONLY (OUTPATIENT)
Dept: SLEEP MEDICINE | Facility: HOSPITAL | Age: 69
End: 2024-03-06
Attending: FAMILY MEDICINE
Payer: MEDICARE

## 2024-03-06 DIAGNOSIS — Z95.1 S/P CABG X 3: ICD-10-CM

## 2024-03-06 DIAGNOSIS — R06.83 SNORING: ICD-10-CM

## 2024-03-06 DIAGNOSIS — I10 ESSENTIAL HYPERTENSION: ICD-10-CM

## 2024-03-06 PROCEDURE — 95800 SLP STDY UNATTENDED: CPT

## 2024-03-06 PROCEDURE — 95800 SLP STDY UNATTENDED: CPT | Mod: 26 | Performed by: FAMILY MEDICINE

## 2024-03-06 NOTE — PROGRESS NOTES
WatchPAT data has been received and has been scored using rule 1B, 4%. Patient to follow up with provider to determine appropriate therapy.    Pat AHI: 32.9    Ordering Provider: Dr Jamaal Valdivia      Sleep Technician/Technologist: Sandie RAMOS

## 2024-03-10 NOTE — PROCEDURES
"WatchPAT - HOME SLEEP STUDY INTERPRETATION    Patient: Kirk John  MRN: [id number]  YOB: 1955  Study Date: 3/5/2024  Referring Provider: Erica Hackett  Ordering Provider: Jamaal Valdivia MD, MD    Chain of custody patient verification was not enabled.       Indications for Home Study: Kirk John is a 68 year old male with a history of ANGEL, HLD, pre-diabetes, CAD s/p CABG x3, HTN, rheumatoid arthritis who presents with symptoms suggestive of obstructive sleep apnea.    Estimated body mass index is 32.68 kg/m  as calculated from the following:    Height as of 2/12/24: 1.784 m (5' 10.25\").    Weight as of 2/12/24: 104.1 kg (229 lb 6.4 oz).  Total score - [place]: 8 (1/5/2024  3:03 PM)  Total Score: 5 (1/5/2024  3:00 PM)    Data: A full night home sleep study was performed recording the standard physiologic parameters including peripheral arterial tonometry (PAT), sound/snoring, body position,  movement, sound, and oxygen saturation by pulse oximetry. Pulse rate was estimated by oximetry recording. Sleep staging (wake, REM, light, and deep sleep) was derived from PAT signal.  This study was considered adequate based on > 4 hours of quality oximetry and respiratory recording. As specified by the AASM Manual for the Scoring of Sleep and Associated events, version 2.3, Rule VIII.D 1B, 4% oxygen desaturation scoring for hypopneas is used as a standard of care on all home sleep apnea testing.    Total Recording Time: 8 hrs, 6 min  Total Sleep Time: 6 hrs, 52 min  % of Sleep Time REM: 22.7%    Respiratory:  Snoring: Snoring was present.  Respiratory events: The PAT respiratory disturbance index [pRDI] was 33.7 events per hour.  The PAT apnea/hypopnea index [pAHI] was 32.9 events per hour.  KEITH was 30.6 events per hour.  During REM sleep the pAHI was 37.2.  Sleep Associated Hypoxemia: sustained hypoxemia was not present. Mean oxygen saturation was 93%.  Minimum was 66%.  Time with saturation less " than 88% was 8.9 minutes.    Heart Rate: By pulse oximetry normal rate was noted.     Position: Percent of time spent: supine - 18.7%, prone - 0%, on right - 39.2%, on left - 42.1%.  pAHI was 54.3 per hour supine, - per hour prone, 24.8 per hour on right side, and 31.2 per hour on left side.     Assessment:   Severe obstructive sleep apnea.  Sleep associated hypoxemia was present.    Recommendations:  Consider auto-CPAP at 5-15 cmH2O or polysomnography with full night PAP titration.  Suggest optimizing sleep hygiene and avoiding sleep deprivation.  Weight management.    Diagnosis Code(s): Obstructive Sleep Apnea G47.33, Hypoxemia G47.36    Jamaal Valdivia MD, MD, March 10, 2024   Diplomate, American Board of Family Medicine, Sleep Medicine

## 2024-04-01 ENCOUNTER — OFFICE VISIT (OUTPATIENT)
Dept: ORTHOPEDICS | Facility: OTHER | Age: 69
End: 2024-04-01
Attending: PHYSICIAN ASSISTANT
Payer: COMMERCIAL

## 2024-04-01 VITALS
BODY MASS INDEX: 33.79 KG/M2 | WEIGHT: 236 LBS | OXYGEN SATURATION: 99 % | DIASTOLIC BLOOD PRESSURE: 78 MMHG | HEIGHT: 70 IN | SYSTOLIC BLOOD PRESSURE: 128 MMHG | HEART RATE: 64 BPM | RESPIRATION RATE: 16 BRPM

## 2024-04-01 DIAGNOSIS — M75.102 TEAR OF LEFT SUPRASPINATUS TENDON: ICD-10-CM

## 2024-04-01 DIAGNOSIS — M67.814 TENDINOSIS OF LEFT SHOULDER: ICD-10-CM

## 2024-04-01 PROCEDURE — 99203 OFFICE O/P NEW LOW 30 MIN: CPT | Performed by: ORTHOPAEDIC SURGERY

## 2024-04-01 PROCEDURE — G0463 HOSPITAL OUTPT CLINIC VISIT: HCPCS

## 2024-04-01 NOTE — PROGRESS NOTES
Subjective:    68-year-old gentleman with left shoulder pain.  He fell on the ice back in spring 2023.  Was supposed to have surgery on his left shoulder but then instead ended up undergoing coronary artery bypass grafting on 9/22/2023.  Now once to have his shoulder repaired.  He is already done physical therapy and that has failed    Objective:    On examination today this is a 68-year-old gentleman no acute distress.  Very pleasant on examination.  He has near full range of motion of his shoulder.  His significant pain in the left shoulder with range of motion.  He is tender to palpation at Codman's point, mildly tender to palpation at the AC joint and tender to palpation of bicipital groove.    Assessment:    68-year-old gentleman with rotator cuff tear of his left shoulder.    Plan:    He has failed conservative measures for treatment on this I would like to proceed forward with arthroscopy.  All the risks and benefits of surgical intervention were discussed with him and he wishes to proceed.  Will see him back time of surgery

## 2024-04-03 DIAGNOSIS — Z95.1 S/P CABG X 3: ICD-10-CM

## 2024-04-03 RX ORDER — POTASSIUM CHLORIDE 750 MG/1
TABLET, EXTENDED RELEASE ORAL
Qty: 120 TABLET | Refills: 1 | Status: SHIPPED | OUTPATIENT
Start: 2024-04-03 | End: 2024-06-06

## 2024-04-03 NOTE — TELEPHONE ENCOUNTER
"Requested Prescriptions   Pending Prescriptions Disp Refills    potassium chloride taco ER (KLOR-CON M10) 10 MEQ CR tablet [Pharmacy Med Name: KLOR-CON M10 TABLET] 120 tablet 1     Sig: TAKE 20 MQ (2 TABS) ON DAYS YOU TAKE LASIX       Potassium Supplements Protocol Passed - 4/3/2024  1:15 AM        Passed - Medication is active on med list        Passed - Medication indicated for associated diagnosis     Potassium is associated with one of the following diagnoses:    Hypokalemia    Hypokalemia prophylaxis   Hypertension   Heart failure   Edema        Passed - Recent (12 mo) or future (90 days) visit within the authorizing provider's department     The patient must have completed an in-person or virtual visit within the past 12 months or has a future visit scheduled within the next 90 days with the authorizing provider s specialty.  Urgent care and e-visits do not quality as an office visit for this protocol.        Passed - Patient is age 18 or older        Passed - Normal serum potassium in past 12 months     Recent Labs   Lab Test 01/04/24  0902   POTASSIUM 4.6         Last Written Prescription Date:  10/11/23  Last Fill Quantity: 120,   # refills: 1  Last Office Visit: 12/14/23  Future Office visit:    Next 5 appointments (look out 90 days)      Apr 08, 2024  8:00 AM  (Arrive by 7:45 AM)  SHORT with Jamaal Valdivia MD  [facility] [address]  [place] [address]  [phone]     Jun 13, 2024  9:45 AM  (Arrive by 9:30 AM)  Return Visit with TESSIE Youssef CNP  [facility] ([facility] ) [address]  [place] [address]  [phone]     Routing refill request to provider for review/approval because:  Furosemide was discontinued by TESSIE Gordon CNP on 1/31/24 due to \"therapy completed\"  see phone note from 1/31/24    Unable to " complete prescription refill per RN Medication Refill Policy.   Francine Jimenez RN ....................  4/3/2024   9:24 AM

## 2024-04-06 NOTE — PROGRESS NOTES
Kirk John is a 68 year old male who is being evaluated via in-person clinic visit.       Visit Details     In-clinic visit for review of sleep testing results.     A/P:     1.)  Severe ANGEL (pAHI 32.9) with mild sleep-associated hypoxemia (SpO2 <= 88% for 8.9 minutes) presumed on room air.    -Prior abnormal overnight oximetry at [facility] in May 2023 with KEITH ~33 and time of SpO2 below 89% of 33 minutes performed with supplemental oxygen at 1 L/min.    Overall, does appear that severity of hypoxemia has improved given that he is now status post carotid endarterectomy, coronary artery bypass graft and interim weight loss of 30-40 pounds.    I would still recommend start of treatment to reduce long-term cardiovascular risk factors.    We discussed options including CPAP, oral appliances, ongoing weight management, surgical options.    Given that his wife has done very well with an oral appliance, and plans for ongoing weight loss, our treatment plan as of today is to proceed with referral to sleep dentistry for oral appliance for obstructive sleep apnea and ongoing weight management.    He would like to work with his dentist that made his wife's sleep apnea mouthguard, Dr. Leah Damon at the [facility].  I recommended follow-up 3 months after obtaining his mouthguard we will consider at that time for repeat home sleep testing with use of mouthguard to document resolution of apnea.    Dr. Leah Damon  [email or web address]   [address] [place] [address]   [phone]   [phone] - FAX     [email or web address]    The longitudinal plan of care for the diagnosis(es)/condition(s) as documented were addressed during this visit. Due to the added complexity in care, I will continue to support Kirk in the subsequent management and with ongoing continuity of care.      SUBJECTIVE:  Kirk Jhon is a 68 year old male.    Pertinent PMHx of ANGEL, HLD, pre-diabetes, CAD s/p  "CABG x3, HTN, rheumatoid arthritis.     I did find one CRISTAL performed at [place] on 5/1/2023 on oxygen at 1 LPM.  Mean SpO2 95%, jass 71% with repetitive desats suggestive of ANGEL.    2/12/2024 -we reviewed his interim history, including his time at [facility] around the time of TIA, carotid endarterectomy, and subsequent coronary artery bypass graft.  He was not discharged on oxygen that he recalls.     Since his interim weight loss, his snoring is very intermittent, no observed apnea.     Chief concern per questionnaire: \"Concern re drop in blood oxygen level while in [place] ICU following R carotid artery angioplasty with stent. RT Pulse Oximetry, Overnight done and on file via access from [place] in MyChart. \"     Duration of symptoms:  \"Unknown until asked in [place] ICU on 5/1/23 if I had ever been told I had sleep apnea. \"     Goals for visit per questionnaire: \"To find out if I really have sleep apnea and/or need a C Pap machine. \"     Sleep pattern:  Workdays.  10:30pm to 4:30-5:30am with goal of 6am  Weekends.  similar.  Time to fall asleep: ~10 minutes.  Awakenings: 4-5 times per night, 15 minutes to return to sleep.  Average total sleep time:  6.25 hours  Napping. 1-2 days per week, ? hours per nap.     No for RLS screen.  No for sleep walking.  No for dream enactment behavior.  No for bruxism.     No for morning headaches.  No for snoring.  No for observed apnea.  Yes for FHx of ANGEL - 2x brothers.     Caffeine use:  Yes for 3+ per day.  No for within 6 hours of bed.    A/P for WatchPAT given suspicion for improvement in hypoxemia but still high risk for ANGEL.    Today -reviewed his home sleep test results in detail.    WatchPAT - HOME SLEEP STUDY INTERPRETATION     Patient: Kirk John  MRN: [id number]  YOB: 1955  Study Date: 3/5/2024  Referring Provider: Erica Hackett  Ordering Provider: Jamaal Valdivia MD, MD     Chain of custody patient verification was not enabled.       Indications " "for Home Study: Kirk John is a 68 year old male with a history of ANGEL, HLD, pre-diabetes, CAD s/p CABG x3, HTN, rheumatoid arthritis who presents with symptoms suggestive of obstructive sleep apnea.     Estimated body mass index is 32.68 kg/m  as calculated from the following:    Height as of 2/12/24: 1.784 m (5' 10.25\").    Weight as of 2/12/24: 104.1 kg (229 lb 6.4 oz).  Total score - [place]: 8 (1/5/2024  3:03 PM)  Total Score: 5 (1/5/2024  3:00 PM)     Data: A full night home sleep study was performed recording the standard physiologic parameters including peripheral arterial tonometry (PAT), sound/snoring, body position,  movement, sound, and oxygen saturation by pulse oximetry. Pulse rate was estimated by oximetry recording. Sleep staging (wake, REM, light, and deep sleep) was derived from PAT signal.  This study was considered adequate based on > 4 hours of quality oximetry and respiratory recording. As specified by the AASM Manual for the Scoring of Sleep and Associated events, version 2.3, Rule VIII.D 1B, 4% oxygen desaturation scoring for hypopneas is used as a standard of care on all home sleep apnea testing.     Total Recording Time: 8 hrs, 6 min  Total Sleep Time: 6 hrs, 52 min  % of Sleep Time REM: 22.7%     Respiratory:  Snoring: Snoring was present.  Respiratory events: The PAT respiratory disturbance index [pRDI] was 33.7 events per hour.  The PAT apnea/hypopnea index [pAHI] was 32.9 events per hour.  KEITH was 30.6 events per hour.  During REM sleep the pAHI was 37.2.  Sleep Associated Hypoxemia: sustained hypoxemia was not present. Mean oxygen saturation was 93%.  Minimum was 66%.  Time with saturation less than 88% was 8.9 minutes.     Heart Rate: By pulse oximetry normal rate was noted.      Position: Percent of time spent: supine - 18.7%, prone - 0%, on right - 39.2%, on left - 42.1%.  pAHI was 54.3 per hour supine, - per hour prone, 24.8 per hour on right side, and 31.2 per hour on left " side.      Assessment:   Severe obstructive sleep apnea.  Sleep associated hypoxemia was present.     Recommendations:  Consider auto-CPAP at 5-15 cmH2O or polysomnography with full night PAP titration.  Suggest optimizing sleep hygiene and avoiding sleep deprivation.  Weight management.     Diagnosis Code(s): Obstructive Sleep Apnea G47.33, Hypoxemia G47.36     Jamaal Valdivia MD, MD, March 10, 2024   Diplomate, American Board of Family Medicine, Sleep Medicine      Past medical history:    Patient Active Problem List    Diagnosis Date Noted    Sleep apnea, unspecified type 01/04/2024     Priority: Medium    Essential hypertension 11/04/2023     Priority: Medium    Coronary artery disease involving autologous artery coronary bypass graft without angina pectoris 11/04/2023     Priority: Medium    Disorder involving thrombocytopenia (H24) 11/01/2023     Priority: Medium    Rheumatoid arthritis involving multiple sites with positive rheumatoid factor (H) 10/29/2023     Priority: Medium    S/P CABG x 3 (9/22/2023) 10/11/2023     Priority: Medium    History of coronary artery stent placement (2012) 10/11/2023     Priority: Medium    S/P patent foramen ovale closure (9/22/2023) 10/11/2023     Priority: Medium    Hyperlipidemia LDL goal <70 10/11/2023     Priority: Medium    Postoperative atrial fibrillation (H) 09/22/2023     Priority: Medium    Obstructive sleep apnea syndrome in adult 09/18/2023     Priority: Medium    Patent foramen ovale 09/18/2023     Priority: Medium    Personal history of tobacco use, presenting hazards to health 09/18/2023     Priority: Medium    Chronic left shoulder pain 09/14/2023     Priority: Medium    Traumatic incomplete tear of left rotator cuff, initial encounter 09/14/2023     Priority: Medium    Chronic anticoagulation 05/08/2023     Priority: Medium    Internal carotid artery stent present 05/08/2023     Priority: Medium    H/O amaurosis fugax 05/08/2023     Priority: Medium     Presence of other vascular implants and grafts 05/08/2023     Priority: Medium    History of right common carotid artery stent placement 05/02/2023     Priority: Medium    CKD (chronic kidney disease) stage 2, GFR 60-89 ml/min 12/14/2022     Priority: Medium    Degeneration of intervertebral disc 02/01/2018     Priority: Medium     Overview:   With back pain      Psychosexual dysfunction with inhibited sexual excitement 02/01/2018     Priority: Medium    Hypercholesterolemia 02/01/2018     Priority: Medium    Prediabetes 02/01/2018     Priority: Medium    Diverticulosis of sigmoid colon 01/25/2016     Priority: Medium    Health care maintenance 01/21/2016     Priority: Medium    Coronary atherosclerosis 09/08/2013     Priority: Medium     Overview:   August 2012 PRAVIN stent placed at [facility] in [place]. Moderate diffuse disease in LAD and Circumflex.       Hip pain 07/03/2012     Priority: Medium    Tick bite 04/25/2011     Priority: Medium       10 point ROS of systems including Constitutional, Eyes, Respiratory, Cardiovascular, Gastroenterology, Genitourinary, Integumentary, Muscularskeletal, Psychiatric were all negative except for pertinent positives noted in my HPI.    Current Outpatient Medications   Medication Sig Dispense Refill    acetaminophen (TYLENOL) 500 MG tablet Take 500-1,000 mg by mouth every 6 hours as needed for mild pain Take at bedtime as needed for shoulder pain      aspirin 81 MG EC tablet Take 81 mg by mouth daily      blood glucose (NO BRAND SPECIFIED) test strip Use to test blood sugar 1 times daily. Dx: R73.03 100 strip 3    blood glucose monitoring (ACCU-CHEK ENEDINA PLUS) meter device kit Use to test blood sugar 1 times daily. Dx: R73.03 1 kit 0    blood glucose monitoring (ACCU-CHEK MULTICLIX) lancets Use to test blood sugar 1 times daily. Dx: R73.03 102 each 3    cholecalciferol (VITAMIN D3) 125 mcg (5000 units) capsule Take 125 mcg by mouth daily      lisinopril (ZESTRIL) 5 MG tablet Take 1  tablet (5 mg) by mouth daily 90 tablet 1    metoprolol succinate ER (TOPROL XL) 25 MG 24 hr tablet Take 0.5 tablets (12.5 mg) by mouth daily 45 tablet 3    Multiple Vitamins-Minerals (MULTIVITAMIN & MINERAL PO) Take 1 tablet by mouth      nitroGLYcerin (NITROSTAT) 0.4 MG sublingual tablet PLACE 1 TABLET UNDER THE TONGUE EVERY 5 MINUTES IF NEEDED FOR CHEST PAIN. 25 tablet 0    potassium chloride taco ER (KLOR-CON M10) 10 MEQ CR tablet TAKE 20 MQ (2 TABS) ON DAYS YOU TAKE LASIX 120 tablet 1    rosuvastatin (CRESTOR) 40 MG tablet Take 40 mg by mouth daily      sildenafil (VIAGRA) 50 MG tablet Take 1 tablet (50 mg) by mouth daily as needed (Erectile dysfunction) 10 tablet 11       OBJECTIVE:  There were no vitals taken for this visit.    Physical Exam     ---  This note was written with the assistance of the Dragon voice-dictation technology software. The final document, although reviewed, may contain errors. For corrections, please contact the office.    Total time spent preparing to see the patient, review of chart, obtaining history and physical examination, review of sleep testing, review of treatment options, education, discussion with patient and documenting in Epic / EMR was 25 minutes.  All time involved was spent on the day of service for the patient (the same day as the patient's appointment).    Jamaal Valdivia MD    Sleep Medicine  [facility]  - [place], [place]  Main Office: [phone]  [facility] and [facility] [place]  [address] [place] [address], [address]  Schedule visits: [phone]  Main Office: [phone]  Fax: [phone]

## 2024-04-08 ENCOUNTER — OFFICE VISIT (OUTPATIENT)
Dept: FAMILY MEDICINE | Facility: OTHER | Age: 69
End: 2024-04-08
Attending: FAMILY MEDICINE
Payer: COMMERCIAL

## 2024-04-08 DIAGNOSIS — G47.33 OSA (OBSTRUCTIVE SLEEP APNEA): Primary | ICD-10-CM

## 2024-04-08 DIAGNOSIS — Z95.1 S/P CABG X 3: ICD-10-CM

## 2024-04-08 DIAGNOSIS — I25.10 ATHEROSCLEROSIS OF NATIVE CORONARY ARTERY OF NATIVE HEART WITHOUT ANGINA PECTORIS: ICD-10-CM

## 2024-04-08 PROCEDURE — G0463 HOSPITAL OUTPT CLINIC VISIT: HCPCS | Performed by: FAMILY MEDICINE

## 2024-04-08 PROCEDURE — 99213 OFFICE O/P EST LOW 20 MIN: CPT | Performed by: FAMILY MEDICINE

## 2024-04-08 PROCEDURE — G2211 COMPLEX E/M VISIT ADD ON: HCPCS | Performed by: FAMILY MEDICINE

## 2024-04-08 NOTE — PROGRESS NOTES
"Sleep Medicine Clinic Rooming Note    Patient was identified appropriately by name and date of birth.    Medication Reconciliation: complete    Chief complaint: Find out results of sleep study.       Height: 5'10\" ft/inches  Weight: 236 lbs  SpO2: 98  HR: 57  BP: 126/72  Neck circumference: 19 inches     Winston Sleepiness Scale    How likely are you to doze off or fall asleep in the following situations, in contrast to just feeling tired?    This refers to your usual way of life recently.    Even if you haven't done some of these things recently, try to figure out how they would have affected you.    Use the following scale to choose the most appropriate number for each situation:  0 = NO CHANCE of dozing  1 = SLIGHT CHANCE of dozing  2 = MODERATE CHANCE of dozing  3 = HIGH CHANCE of dozing    Sitting and Readin  Watching television: 1  Sitting inactive in a public place:0  Riding in car:1  Laying down to rest in the afternoon:2  Sitting and talking to someone:0  Sitting quietly after lunch without alcohol:0  In a car, stopped in traffic for a few minutes:0    Score: 5    DME needs: -    Additional Notes: -     "

## 2024-05-29 NOTE — PROGRESS NOTES
02/29/24 0949   Appointment Info   Signing clinician's name / credentials Graham Walkerparas,, PT, OCS   Visits Used 10   Medical Diagnosis Chronic left shoulder pain (M25.512, G89.29),   PT Tx Diagnosis Impaired range of motion, weakness, left shoulder pain, impingement, postural dysfunction   Progress Note/Certification   Start of Care Date 01/15/24   Onset of illness/injury or Date of Surgery   (March 2023.)   Therapy Frequency 16 visits   Predicted Duration 8 weeks   Certification date from 01/15/24   Certification date to 03/12/24   PT Goal 1   Goal Identifier Left shoulder pain   Goal Description Patient will return to daily lifting and ADL activities such as reaching behind the back without left shoulder pain to allow tucking his shirt without difficulty.   Rationale to maximize safety and independence with performance of ADLs and functional tasks;to maximize safety and independence within the home;to maximize safety and independence with self cares   Goal Progress Progressing towards goal   Target Date 03/12/24   PT Goal 2   Goal Identifier Left shoulder range of motion   Goal Description Patient will improve left shoulder flexion to 140 degrees or greater to allow reaching to an overhead shelf without difficulty.   Rationale to maximize safety and independence with performance of ADLs and functional tasks;to maximize safety and independence within the home;to maximize safety and independence within the community   Target Date 03/12/24   Goal Progress Progressing towards goal   PT Goal 3   Goal Identifier Left shoulder weakness   Goal Description Patient will improve left shoulder strength by one half grade or greater to allow lifting a 2 pound object on overhead shelf without difficulty or limitation.   Rationale to maximize safety and independence with performance of ADLs and functional tasks;to maximize safety and independence within the home;to maximize safety and independence within the community   Target  Date 03/12/24   Goal Progress Progressing towards goal   Subjective Report   Subjective Report Patient states he was able to increase weight to a 8 ounce can and perform 10 reps of exercise without difficulty.   Objective Measures   Objective Measures Objective Measure 1;Objective Measure 2;Objective Measure 3   Objective Measure 1   Objective Measure Left shoulder pain   Details 0-2/10   Objective Measure 2   Objective Measure Left  shoulder ROM   Details Flexion: 140 degrees.  Abduction: 130 degrees. External rotation: Reaching behind head to C7 bilaterally.  Internal rotation: Reaching behind back to L1 bilaterally-pain reported left.   Objective Measure 3   Objective Measure Left shoulder strength   Details Flexion: 4+/5  Abduction: 4+/5  External rotation: 5 -/5  Internal rotation: 5 -/5   PT Modalities   PT Modalities Vasopneumatic device   Treatment Interventions (PT)   Interventions Therapeutic Procedure/Exercise;Manual Therapy   Therapeutic Procedure/Exercise   Therapeutic Procedures: strength, endurance, ROM, flexibility minutes (39727) 5   Ther Proc 1 - Details UBE x 2 minutes each way.  Patient reports the 8 oz can went well. Patient will continue with yellow Thera-Band when performing internal rotation.  He will increase to an 8 ounce weight with supine shoulder flexion, supine shoulder protraction, side-lying external rotation, side-lying shoulder abduction.  Recommended he start with 10 repetitions and gradually increase to 30 as symptoms allow.  Patient was instructed to discontinue use of 8 ounce weight if increased pain occurs with any exercises.  Continue working on exercises 3 times a week.   Skilled Intervention education, HEP, range of motion, strengthening   Patient Response/Progress Patient verbalized and demonstrated progression of home exercise program and symptom management techniques.   Manual Therapy   Manual Therapy: Mobilization, MFR, MLD, friction massage minutes (47488) 25   Manual  Therapy 1 - Details STM/MFR/IASTM using GT 4 to the left upper trapezius, levator scapula, rhomboid, infraspinatus,  teres minor, lateral shoulder and and long head biceps tendon pathway..   Skilled Intervention Decrease pain, improve tissue mobility, improve range of motion   Patient Response/Progress Positive response to treatment reported.   Education   Learner/Method Patient;Listening;Reading;Demonstration;Pictures/Video;No Barriers to Learning   Plan   Home program Scap set with low trap activation x 10, 5 sec. Supine flexion starting at 90 degrees working through a pain-free arc of motion x 30. Side ER x 30. Side Abduction to 90 degrees x 30.  Progress home exercise program following a low weight high repetition program. Updated 1/17/24: Supine protraction x 10-30-Perform once a day or as symptoms allow.  Updated 2/6/2024: Recommended to perform exercises 3 days a week, and 20 reps or less at this time to assist with reduction in pain with exercises.  Updated 2/22:Standing IR with yellow x 10-30.-Perform 3 times a week or as symptoms allow.   Plan for next session Progress exercises as symptoms allow.  Manual therapy and modalities as indicated.   Total Session Time   Timed Code Treatment Minutes 30   Total Treatment Time (sum of timed and untimed services) 30         DISCHARGE  Reason for Discharge: Patient was able to proceed with independent HEP. Patient was set up to see Dr. French for orthopedic consultation. No additional PT appointments were scheduled.     Discharge Plan: Patient to continue home program.  Orthopedic consultation.     Referring Provider:  Erica Hackett

## 2024-06-13 ENCOUNTER — OFFICE VISIT (OUTPATIENT)
Dept: CARDIOLOGY | Facility: OTHER | Age: 69
End: 2024-06-13
Attending: NURSE PRACTITIONER
Payer: COMMERCIAL

## 2024-06-13 VITALS
SYSTOLIC BLOOD PRESSURE: 136 MMHG | BODY MASS INDEX: 32.34 KG/M2 | HEIGHT: 71 IN | OXYGEN SATURATION: 99 % | DIASTOLIC BLOOD PRESSURE: 70 MMHG | HEART RATE: 60 BPM | WEIGHT: 231 LBS | RESPIRATION RATE: 16 BRPM | TEMPERATURE: 97.6 F

## 2024-06-13 DIAGNOSIS — Z95.1 S/P CABG X 3: Primary | ICD-10-CM

## 2024-06-13 DIAGNOSIS — Z87.74 S/P PATENT FORAMEN OVALE CLOSURE: ICD-10-CM

## 2024-06-13 DIAGNOSIS — E78.5 HYPERLIPIDEMIA LDL GOAL <70: ICD-10-CM

## 2024-06-13 DIAGNOSIS — I10 ESSENTIAL HYPERTENSION: ICD-10-CM

## 2024-06-13 DIAGNOSIS — Z95.828 HISTORY OF RIGHT COMMON CAROTID ARTERY STENT PLACEMENT: ICD-10-CM

## 2024-06-13 DIAGNOSIS — I31.8 RESTRICTIVE PERICARDITIS: ICD-10-CM

## 2024-06-13 DIAGNOSIS — I48.91 POSTOPERATIVE ATRIAL FIBRILLATION (H): ICD-10-CM

## 2024-06-13 DIAGNOSIS — I97.89 POSTOPERATIVE ATRIAL FIBRILLATION (H): ICD-10-CM

## 2024-06-13 DIAGNOSIS — Z98.890 HISTORY OF RIGHT COMMON CAROTID ARTERY STENT PLACEMENT: ICD-10-CM

## 2024-06-13 DIAGNOSIS — G47.33 OSA (OBSTRUCTIVE SLEEP APNEA): ICD-10-CM

## 2024-06-13 DIAGNOSIS — Z95.5 HISTORY OF CORONARY ARTERY STENT PLACEMENT: ICD-10-CM

## 2024-06-13 PROCEDURE — G0463 HOSPITAL OUTPT CLINIC VISIT: HCPCS

## 2024-06-13 PROCEDURE — 99214 OFFICE O/P EST MOD 30 MIN: CPT | Performed by: NURSE PRACTITIONER

## 2024-06-13 RX ORDER — POTASSIUM CHLORIDE 750 MG/1
TABLET, EXTENDED RELEASE ORAL
Qty: 90 TABLET | Refills: 1 | Status: SHIPPED | OUTPATIENT
Start: 2024-06-13 | End: 2024-07-22

## 2024-06-13 RX ORDER — LISINOPRIL 5 MG/1
5 TABLET ORAL DAILY
Qty: 90 TABLET | Refills: 4 | Status: SHIPPED | OUTPATIENT
Start: 2024-06-13

## 2024-06-13 RX ORDER — ROSUVASTATIN CALCIUM 40 MG/1
40 TABLET, COATED ORAL DAILY
Qty: 90 TABLET | Refills: 4 | Status: SHIPPED | OUTPATIENT
Start: 2024-06-13

## 2024-06-13 RX ORDER — FUROSEMIDE 20 MG
20 TABLET ORAL DAILY
COMMUNITY
Start: 2024-02-01 | End: 2024-06-13

## 2024-06-13 RX ORDER — FUROSEMIDE 20 MG
TABLET ORAL
Qty: 90 TABLET | Refills: 1 | Status: SHIPPED | OUTPATIENT
Start: 2024-06-13

## 2024-06-13 ASSESSMENT — PAIN SCALES - GENERAL: PAINLEVEL: NO PAIN (0)

## 2024-06-13 NOTE — PROGRESS NOTES
[facility] HEART CARE   CARDIOLOGY PROGRESS NOTE    Kirk John   [address] [id number]    Erica Hackett     HPI:   Mrs. John is 68 year old male who presents for cardiology follow-up to visit on 12/14/2023.  Patient recently established cardiology care s/p 3V CABG with PFO closure on 9/22/2023 at [facility]. Patient has a history of HLD, CAD with history of prior coronary stent placement in 2012, prediabetes, diverticulosis and remote history of tobacco use.     Patient has history of TIA, symptoms included slurred speech, left hemiparesis and left facial droop.  Symptoms lasted approximately 20 to 30 minutes with complete resolution.  CT of the head was unremarkable for any acute intracranial findings, CTA demonstrated high-grade stenosis of the right ICA.  He underwent successful stent placement to the right ICA on 5/1/2023.    He has known CAD as listed above. S/p PCI resulting in 2.5 x 38 mm Promus drug-eluting stent to the pLCX in 2012.      TTE (5/1/2023) demonstrated normal LV chamber size, and normal regionality, with LVEF 57%, grade 1 diastolic dysfunction, normal RV chamber size and systolic function, no hemodynamically significant valve disease, and a small right-to-left atrial shunt which increases to severe with Valsalva release.    S/p Coronary Angiography on 6/16/2023 demonstrating three-vessel CAD with severe pLAD stenosis.  Circumflex consists of large ramus branch with prior stenting revealing discrete in-stent restenosis-moderate in severity.  The RCA was proximally occluded with right to right and left to right collaterals.      S/p 3V CABG (LIMA-LAD, rSVG-OM/RCA) with PFO closure by Dr. Roger at [place] on 9/22/2023. He was discharged on Amiodarone taper in setting of post-op AF. Post op echo revealing restrictive pericarditis, treated with colchicine for 6 weeks, no NSAID's. Repeat TTE recommended one month post-op.    Repeat TTE 10/23/2023-hyperdynamic LV  systolic function, LVEF greater than 70%, mild LVH, normal LV diastolic function.  Normal RV size and function.  Both atria appear normal.  No hemodynamically significant valvular abnormalities.  No pulmonary hypertension, RVSP 24.4 mmHg plus RAP.  Pericardial effusion resolved.    INTERVAL HISTORY:  Today, patient reports feeling good. No chest pain or pressure. No palpations or recurrence of AF symptoms since off Amiodarone. No increased dyspnea at rest or with exertion. No orthopnea or PND. No further edema in LLE, previous US was negative for DVT at affected leg of SVG. No chest pain or pressure. No lightheadedness or syncope. No concerns today.    PAST MEDICAL HISTORY:   Past Medical History:   Diagnosis Date    Abnormal result of other cardiovascular function study (CODE)     No Comments Provided    Atherosclerotic heart disease of native coronary artery without angina pectoris     No Comments Provided    Chest pain     No Comments Provided    Essential (primary) hypertension     No Comments Provided    Hyperlipidemia     No Comments Provided    Morbid obesity (H)     Osteoarthritis     No Comments Provided          FAMILY HISTORY:   Family History   Problem Relation Age of Onset    Cancer Father         Cancer,Lung    Peripheral Vascular Disease Father     Heart Disease Mother 70        Heart Disease,CAD/MI    Pacemaker Sister     Heart Disease Maternal Grandfather 60        Heart Disease,CAD    Heart Disease Maternal Uncle 60        Heart Disease,CAD    No Known Problems Sister     Diabetes No family hx of     Prostate Cancer No family hx of     Colon Cancer No family hx of           PAST SURGICAL HISTORY:   Past Surgical History:   Procedure Laterality Date    ARTHROPLASTY HIP      5/2007,Left total hip with Dr Graham Escalante    COLONOSCOPY      6/3/05,Colonoscopy, normal.  F/u in 10 years    COLONOSCOPY  01/25/2016    Normal exam,  F/U 2026    CV CARDIAC CATHERIZATION  08/31/2012    proximal circumflex,  [facility]    RELEASE CARPAL TUNNEL      ,Right carpal tunnel sugery    TONSILLECTOMY, ADENOIDECTOMY, COMBINED      T&A as a child          SOCIAL HISTORY:   Social History     Socioeconomic History    Marital status:      Spouse name: None    Number of children: None    Years of education: None    Highest education level: None   Tobacco Use    Smoking status: Former     Types: Cigarettes     Quit date: 10/11/1984     Years since quittin.0     Passive exposure: Never    Smokeless tobacco: Never    Tobacco comments:     Last smoked over 40 years ago-early 's   Vaping Use    Vaping Use: Never used   Substance and Sexual Activity    Alcohol use: Yes     Comment: 2 a week    Drug use: Never    Sexual activity: Yes     Partners: Female   Social History Narrative    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  . 3 daughters and 4 grandkids.  Likes to hunt and fish.  Plays pickleball.     Social Determinants of Health     Financial Resource Strain: Low Risk  (10/1/2023)    Financial Resource Strain     Within the past 12 months, have you or your family members you live with been unable to get utilities (heat, electricity) when it was really needed?: No   Food Insecurity: Low Risk  (10/1/2023)    Food Insecurity     Within the past 12 months, did you worry that your food would run out before you got money to buy more?: No     Within the past 12 months, did the food you bought just not last and you didn t have money to get more?: No   Transportation Needs: Low Risk  (10/1/2023)    Transportation Needs     Within the past 12 months, has lack of transportation kept you from medical appointments, getting your medicines, non-medical meetings or appointments, work, or from getting things that you need?: No   Housing Stability: Low Risk  (10/1/2023)    Housing Stability     Do you have housing? : Yes     Are you worried about losing your housing?: No          CURRENT MEDICATIONS:   Current Outpatient  Medications   Medication Sig Dispense Refill    acetaminophen (TYLENOL) 500 MG tablet Take 500-1,000 mg by mouth every 6 hours as needed for mild pain Take at bedtime as needed for shoulder pain      aspirin 81 MG EC tablet Take 81 mg by mouth daily      blood glucose (NO BRAND SPECIFIED) test strip Use to test blood sugar 1 times daily. Dx: R73.03 100 strip 3    blood glucose monitoring (ACCU-CHEK ENEDINA PLUS) meter device kit Use to test blood sugar 1 times daily. Dx: R73.03 1 kit 0    blood glucose monitoring (ACCU-CHEK MULTICLIX) lancets Use to test blood sugar 1 times daily. Dx: R73.03 102 each 3    cholecalciferol (VITAMIN D3) 125 mcg (5000 units) capsule Take 125 mcg by mouth daily      furosemide (LASIX) 20 MG tablet Take lasix 20 mg in the morning only if needed for increased edema in legs. 90 tablet 1    lisinopril (ZESTRIL) 5 MG tablet Take 1 tablet (5 mg) by mouth daily 90 tablet 4    metoprolol succinate ER (TOPROL XL) 25 MG 24 hr tablet Take 0.5 tablets (12.5 mg) by mouth daily 45 tablet 3    Multiple Vitamins-Minerals (MULTIVITAMIN & MINERAL PO) Take 1 tablet by mouth      nitroGLYcerin (NITROSTAT) 0.4 MG sublingual tablet PLACE 1 TABLET UNDER THE TONGUE EVERY 5 MINUTES IF NEEDED FOR CHEST PAIN. 25 tablet 0    potassium chloride taco ER (KLOR-CON M10) 10 MEQ CR tablet TAKE 20 MQ (2 TABS) ON DAYS YOU TAKE LASIX 90 tablet 1    rosuvastatin (CRESTOR) 40 MG tablet Take 1 tablet (40 mg) by mouth daily 90 tablet 4    sildenafil (VIAGRA) 50 MG tablet Take 1 tablet (50 mg) by mouth daily as needed (Erectile dysfunction) 10 tablet 11     No current facility-administered medications for this visit.         ALLERGIES:   Allergies   Allergen Reactions    Poison Ivy Extract Other (See Comments)     Hospitalized as a child, whole body reaction including the throat.          ROS:   CONSTITUTIONAL: No reported fever or chills.  Weight stable.  ENT: No visual disturbance, ear ache, epistaxis or sore throat.  "  CARDIOVASCULAR: No chest pain, chest pressure or chest discomfort. No palpitations. No edema.   RESPIRATORY: No shortness of breath, cough, wheezing or hemoptysis.  No orthopnea or PND.  GI: No reported abdominal pain, melena or hematochezia.  : No reported hematuria or dysuria.   NEUROLOGICAL: No lightheadedness, dizziness, syncope, ataxia, paresthesias or weakness.   HEMATOLOGIC: No history of anemia. No bleeding or excessive bruising. No history of blood clots.   MUSCULOSKELETAL: No new joint pain or swelling, no muscle pain.  ENDOCRINOLOGIC: No temperature intolerance. No hair or skin changes. Chronic left shoulder pain.   SKIN: No abnormal rashes or sores, no unusual itching.   PSYCHIATRIC: No history of depression or anxiety. No changes in mood, feeling down or anxious. No changes in sleep.      PHYSICAL EXAM:   /70 (BP Location: Right arm, Patient Position: Sitting, Cuff Size: Adult Large)   Pulse 60   Temp 97.6  F (36.4  C) (Temporal)   Resp 16   Ht 1.803 m (5' 11\")   Wt 104.8 kg (231 lb)   SpO2 99%   BMI 32.22 kg/m    GENERAL: The patient is a well-developed, well-nourished, in no apparent distress.  HEENT: Head is normocephalic and atraumatic. Eyes are symmetrical with normal visual tracking. No icterus, no xanthelasmas. Nares appeared normal without nasal drainage. Mucous membranes are moist, no cyanosis.  NECK: Supple, no cervical bruits, JVP not visible.   CHEST/ LUNGS: Lungs clear to auscultation, no rales, rhonchi or wheezes, no use of accessory muscles, no retractions, respirations unlabored and normal respiratory rate.   CARDIO: Regular rate and rhythm normal with S1 and S2, no S3 or S4 and no murmur, click or rub.  Sternotomy site healed well, normal exam.   ABD: Abdomen is nondistended.   EXTREMITIES: No edema.  MUSCULOSKELETAL: No visible joint swelling.   NEUROLOGIC: Alert and oriented X3. Normal speech, gait and affect. No focal neurologic deficits.   SKIN: No jaundice. No " rashes or visible skin lesions present. No ecchymosis.     LAB RESULTS:   Office Visit on 01/04/2024   Component Date Value Ref Range Status    Creatinine Urine mg/dL 01/04/2024 90.5  mg/dL Final    The reference ranges have not been established in urine creatinine. The results should be integrated into the clinical context for interpretation.    Albumin Urine mg/L 01/04/2024 235.0  mg/L Final    The reference ranges have not been established in urine albumin. The results should be integrated into the clinical context for interpretation.    Albumin Urine mg/g Cr 01/04/2024 259.67 (H)  0.00 - 17.00 mg/g Cr Final    Microalbuminuria is defined as an albumin:creatinine ratio of 17 to 299 for males and 25 to 299 for females. A ratio of albumin:creatinine of 300 or higher is indicative of overt proteinuria.  Due to biologic variability, positive results should be confirmed by a second, first-morning random or 24-hour timed urine specimen. If there is discrepancy, a third specimen is recommended. When 2 out of 3 results are in the microalbuminuria range, this is evidence for incipient nephropathy and warrants increased efforts at glucose control, blood pressure control, and institution of therapy with an angiotensin-converting-enzyme (ACE) inhibitor (if the patient can tolerate it).      Sodium 01/04/2024 139  135 - 145 mmol/L Final    Reference intervals for this test were updated on 09/26/2023 to more accurately reflect our healthy population. There may be differences in the flagging of prior results with similar values performed with this method. Interpretation of those prior results can be made in the context of the updated reference intervals.     Potassium 01/04/2024 4.6  3.4 - 5.3 mmol/L Final    Carbon Dioxide (CO2) 01/04/2024 28  22 - 29 mmol/L Final    Anion Gap 01/04/2024 8  7 - 15 mmol/L Final    Urea Nitrogen 01/04/2024 23.2 (H)  8.0 - 23.0 mg/dL Final    Creatinine 01/04/2024 0.85  0.67 - 1.17 mg/dL Final     GFR Estimate 01/04/2024 >90  >60 mL/min/1.73m2 Final    Calcium 01/04/2024 9.7  8.8 - 10.2 mg/dL Final    Chloride 01/04/2024 103  98 - 107 mmol/L Final    Glucose 01/04/2024 109 (H)  70 - 99 mg/dL Final    Alkaline Phosphatase 01/04/2024 80  40 - 150 U/L Final    Reference intervals for this test were updated on 11/14/2023 to more accurately reflect our healthy population. There may be differences in the flagging of prior results with similar values performed with this method. Interpretation of those prior results can be made in the context of the updated reference intervals.    AST 01/04/2024 30  0 - 45 U/L Final    Reference intervals for this test were updated on 6/12/2023 to more accurately reflect our healthy population. There may be differences in the flagging of prior results with similar values performed with this method. Interpretation of those prior results can be made in the context of the updated reference intervals.    ALT 01/04/2024 28  0 - 70 U/L Final    Reference intervals for this test were updated on 6/12/2023 to more accurately reflect our healthy population. There may be differences in the flagging of prior results with similar values performed with this method. Interpretation of those prior results can be made in the context of the updated reference intervals.      Protein Total 01/04/2024 7.5  6.4 - 8.3 g/dL Final    Albumin 01/04/2024 4.6  3.5 - 5.2 g/dL Final    Bilirubin Total 01/04/2024 0.4  <=1.2 mg/dL Final    Cholesterol 01/04/2024 137  <200 mg/dL Final    Triglycerides 01/04/2024 76  <150 mg/dL Final    Direct Measure HDL 01/04/2024 52  >=40 mg/dL Final    LDL Cholesterol Calculated 01/04/2024 70  <=100 mg/dL Final    Non HDL Cholesterol 01/04/2024 85  <130 mg/dL Final    Patient Fasting > 8hrs? 01/04/2024 No   Final    WBC Count 01/04/2024 4.7  4.0 - 11.0 10e3/uL Final    RBC Count 01/04/2024 4.71  4.40 - 5.90 10e6/uL Final    Hemoglobin 01/04/2024 14.0  13.3 - 17.7 g/dL Final     Hematocrit 01/04/2024 43.7  40.0 - 53.0 % Final    MCV 01/04/2024 93  78 - 100 fL Final    MCH 01/04/2024 29.7  26.5 - 33.0 pg Final    MCHC 01/04/2024 32.0  31.5 - 36.5 g/dL Final    RDW 01/04/2024 15.3 (H)  10.0 - 15.0 % Final    Platelet Count 01/04/2024 142 (L)  150 - 450 10e3/uL Final    % Neutrophils 01/04/2024 53  % Final    % Lymphocytes 01/04/2024 39  % Final    % Monocytes 01/04/2024 6  % Final    % Eosinophils 01/04/2024 2  % Final    % Basophils 01/04/2024 0  % Final    % Immature Granulocytes 01/04/2024 0  % Final    NRBCs per 100 WBC 01/04/2024 0  <1 /100 Final    Absolute Neutrophils 01/04/2024 2.5  1.6 - 8.3 10e3/uL Final    Absolute Lymphocytes 01/04/2024 1.8  0.8 - 5.3 10e3/uL Final    Absolute Monocytes 01/04/2024 0.3  0.0 - 1.3 10e3/uL Final    Absolute Eosinophils 01/04/2024 0.1  0.0 - 0.7 10e3/uL Final    Absolute Basophils 01/04/2024 0.0  0.0 - 0.2 10e3/uL Final    Absolute Immature Granulocytes 01/04/2024 0.0  <=0.4 10e3/uL Final    Absolute NRBCs 01/04/2024 0.0  10e3/uL Final       ASSESSMENT:   Kirk John presents for cardiology follow-up to visit on 12/14/2023.  Patient recently established cardiology care s/p 3V CABG with PFO closure on 9/22/2023 at [facility]. Patient has a history of HLD, CAD with history of prior coronary stent placement in 2012, prediabetes, diverticulosis and remote history of tobacco use.   Today, patient reports feeling good. No chest pain or pressure. No palpations or recurrence of AF symptoms since off Amiodarone. No increased dyspnea at rest or with exertion. No orthopnea or PND. No further edema in LLE, previous US was negative for DVT at affected leg of SVG. No chest pain or pressure. No lightheadedness or syncope. No concerns today.    PLAN:  1. S/P CABG x 3 (9/22/2023)  S/p 3V CABG (LIMA-LAD, rSVG-OM/RCA) with PFO closure by Dr. Roger at [place] on 9/22/2023.   Complete Plavix 75 mg at 3 months post-op. Continue on ASA 81 mg daily lifelong.   Continue on  Crestor 40 mg daily with LDL goal 70 or less recommended.   Continue with cardiac risk factor optimization.    2. S/P patent foramen ovale closure (9/22/2023)    3. Postoperative atrial fibrillation (H)  Completed Amiodarone taper.   No recurrence of AF identified.     4. Restrictive pericarditis- Post op CABG  Repeat echocardiogram at one month post-op with resolution of pericardial effusion.   Patient completed full 6 weeks of Colchicine.   No recurrence of chest pain.    5. History of coronary artery stent placement (2012)  Known CAD with a 2.5 x 38 mm Promus drug-eluting stent to the proximal circumflex in 2012.  Remain on ASA 81 mg daily life long.     6. History of right common carotid artery stent placement (5/1/2023)  Right ICA stenosis, subacute right frontal infarcts s/p stent placement (EV3 Protege 10x30).  Vascular recommended DAPT- aspirin 81 and plavix 75 daily for 90 days.  Continue on high intensity statin.     7. Hyperlipidemia LDL goal <70  - rosuvastatin (CRESTOR) 40 MG tablet; Take 1 tablet (40 mg) by mouth daily  Dispense: 90 tablet; Refill: 4    8. Essential hypertension  BP well controlled on current medication regimen.     9. ANGEL (obstructive sleep apnea)  Recent sleep study revealing severe ANGEL with mild sleep associated hypoxemia. He has chose oral appliance along with weight management.      Follow-up with cardiology in 6 months, sooner if needed.   Patient is planning on possible left shoulder arthroscopy this summer, he is cleared by cardiology to proceed. He will require general pre-op physical prior and follow-up with cardiology if any concerns prior to surgery.     Thank you for allowing me to participate in the care of your patient. Please do not hesitate to contact me if you have any questions.     Corazon Dawkins, APRN CNP [facility]

## 2024-06-13 NOTE — NURSING NOTE
"Chief Complaint   Patient presents with    Follow Up     6 month S/P CABG 9/22/2023     Medication Refill    Medication Follow-up     Taking Lasix 20 mg's daily even though it was discontinued on 1/31/2024        Initial /70 (BP Location: Right arm, Patient Position: Sitting, Cuff Size: Adult Large)   Pulse 60   Temp 97.6  F (36.4  C) (Temporal)   Resp 16   Ht 1.803 m (5' 11\")   Wt 104.8 kg (231 lb)   SpO2 99%   BMI 32.22 kg/m   Estimated body mass index is 32.22 kg/m  as calculated from the following:    Height as of this encounter: 1.803 m (5' 11\").    Weight as of this encounter: 104.8 kg (231 lb).  Meds Reconciled: complete  Pt is on Aspirin  Pt is on a Statin  Pt is not on Xarelto or Eliquis  Pt is not on a Warfarin   PHQ and/or BRANDO reviewed. Pt referred to PCP/MH Provider as appropriate.    Michelle Zepeda LPN       "

## 2024-06-13 NOTE — PATIENT INSTRUCTIONS
Start taking Lasix 20 mg only when needed for increased edema/ fluid retention in legs. When you take lasix please take potassium supplement with Lasix dose.   Follow-up with cardiology in 6 months, sooner if needed.

## 2024-07-08 ENCOUNTER — OFFICE VISIT (OUTPATIENT)
Dept: FAMILY MEDICINE | Facility: OTHER | Age: 69
End: 2024-07-08
Attending: PHYSICIAN ASSISTANT
Payer: COMMERCIAL

## 2024-07-08 ENCOUNTER — TELEPHONE (OUTPATIENT)
Dept: ORTHOPEDICS | Facility: OTHER | Age: 69
End: 2024-07-08

## 2024-07-08 VITALS
DIASTOLIC BLOOD PRESSURE: 66 MMHG | SYSTOLIC BLOOD PRESSURE: 112 MMHG | RESPIRATION RATE: 20 BRPM | HEIGHT: 71 IN | WEIGHT: 236 LBS | TEMPERATURE: 96.7 F | BODY MASS INDEX: 33.04 KG/M2 | HEART RATE: 56 BPM

## 2024-07-08 DIAGNOSIS — M75.102 TEAR OF LEFT SUPRASPINATUS TENDON: Primary | ICD-10-CM

## 2024-07-08 DIAGNOSIS — G47.33 OSA (OBSTRUCTIVE SLEEP APNEA): ICD-10-CM

## 2024-07-08 DIAGNOSIS — M67.814 TENDINOSIS OF LEFT SHOULDER: ICD-10-CM

## 2024-07-08 DIAGNOSIS — M70.61 TROCHANTERIC BURSITIS OF RIGHT HIP: ICD-10-CM

## 2024-07-08 DIAGNOSIS — E78.5 HYPERLIPIDEMIA LDL GOAL <70: ICD-10-CM

## 2024-07-08 DIAGNOSIS — Z95.1 S/P CABG X 3: ICD-10-CM

## 2024-07-08 DIAGNOSIS — R73.03 PREDIABETES: ICD-10-CM

## 2024-07-08 DIAGNOSIS — I10 ESSENTIAL HYPERTENSION: ICD-10-CM

## 2024-07-08 PROCEDURE — G0463 HOSPITAL OUTPT CLINIC VISIT: HCPCS

## 2024-07-08 PROCEDURE — 99214 OFFICE O/P EST MOD 30 MIN: CPT | Performed by: PHYSICIAN ASSISTANT

## 2024-07-08 RX ORDER — METOPROLOL SUCCINATE 25 MG/1
12.5 TABLET, EXTENDED RELEASE ORAL DAILY
Qty: 45 TABLET | Refills: 3 | Status: SHIPPED | OUTPATIENT
Start: 2024-07-08

## 2024-07-08 ASSESSMENT — PAIN SCALES - GENERAL: PAINLEVEL: NO PAIN (0)

## 2024-07-08 NOTE — PROGRESS NOTES
Assessment & Plan       ICD-10-CM    1. Tear of left supraspinatus tendon  M75.102       2. Tendinosis of left shoulder  M67.814       3. S/P CABG x 3 (9/22/2023)  Z95.1 metoprolol succinate ER (TOPROL XL) 25 MG 24 hr tablet      4. Prediabetes  R73.03 blood glucose (NO BRAND SPECIFIED) test strip      5. ANGEL (obstructive sleep apnea)  G47.33       6. Essential hypertension  I10       7. Hyperlipidemia LDL goal <70  E78.5       8. Trochanteric bursitis of right hip  M70.61         Left shoulder supraspinatus tear and tendinosis, recommend follow-up with orthopedics to further discuss definitive management/therapy.  Kirk scheduled a visit with Dr. Frnech prior to departing the clinic today.  Recommend to continue with home exercise therapy plan in the interim, heat, ice, topical therapy such as IcyHot, Bengay, Voltaren, etc.  Tendinosis of left shoulder, see #1.  Status post CABG, ongoing close follow-up with myself and cardiology team.  Reviewed lipid panel from 1/4/2024, this is improved when cross compared to lipid panel from 3 months prior.  Continue statin therapy at current dose. Recommend increased lean proteins, fruits and vegetable intake. Goal exercise 150 minutes of moderate exercise per week (walking is great exercise).  Prediabetes last A1c on 12/14/2023, this is normal at 5.5%, this is the best lab value in greater than 6 years.  Recommend ongoing lifestyle modifications, recheck A1c at physical in January 2025.  Obstructive sleep apnea, ongoing close follow-up with pulmonology team, continues with ANGEL mouthguard/mandibular guard, due for follow-up with sleep medicine in October 2024, visit scheduled in regards to this.  Essential hypertension, blood pressure returning at 112/66 today, this is excellent.  Continue low-salt diet and heart healthy diet regimen.  Continue metoprolol 12.5 mg daily and lisinopril 5 mg daily.  Ongoing close follow-up.  Hyperlipidemia, this continues to remain stable.   "Reviewed last lipid panel from January 2024 with improvement as noted as above.  Continue Crestor 40 mg daily.  Mild trochanteric bursitis of the right hip, this is infrequent in nature.  Recommend home exercise therapy plan, printout provided to Kirk today from [employer].  If persistent or worsening recommend follow-up for consideration of possible injection.    BMI  Estimated body mass index is 33.38 kg/m  as calculated from the following:    Height as of this encounter: 1.791 m (5' 10.5\").    Weight as of this encounter: 107 kg (236 lb).   Weight management plan: Discussed healthy diet and exercise guidelines    See Patient Instructions    Return in about 6 months (around 1/8/2025) for Physical.    Subjective   Kirk is a 68 year old, presenting for the following health issues:  Clinic Care Coordination - Follow-up (Medication management.)        7/8/2024     7:53 AM   Additional Questions   Roomed by eli GONZALEZ     Kirk presents to the clinic today for medication and cardiac follow up. His last visit with me was approximately 6-months ago and 4-weeks ago with cardiology - Corazon Dawkins NP    Last lipid panel 01/04/2024 - cholesterol 137, triglycerides 76, HDL 52, LDL 70 (Improved). Stable renal and hepatic function on CMP.     Due for physical in January 2025.     Additional concerns:  Intermittent right sided hip pain, discussed with previous PCP, Dr. Caryn MD a while back. Continues to be intermittent in nature, unclear triggers. Noted this morning lateral hip pain with some instability, but improved after first step after getting out of truck. No falls, injuries, trauma, numbness, tingling or burning. No loss of bowel or bladder function nor saddle anesthesia. Was told possible trochanteric bursitis, wondering on steps for treatment.   Left shoulder - followed with Dr. Manolo MD in Spring 2024, recommended surgery for rotator cuff tear, held off during the summer due to busy summer schedule, would " "like to schedule with him this fall to discuss next steps. Range of motion is so-so, worse with stiffness and pressure changes in the air. Better with heat, ice and home exercise plan (followed with Graham Goode DPT for this as well).   Sleep apnea, due for follow up with Dr. Shea MD in October 2024. Kirk has been using mandibular adjustment/sleep apnea nightguard for the last few weeks, noted improvement. Did not tolerate CPAP well due to claustrophobia. Continues with weight loss and heart healthy regimen.     Hyperlipidemia Follow-Up    Are you regularly taking any medication or supplement to lower your cholesterol?   Yes- statin  Are you having muscle aches or other side effects that you think could be caused by your cholesterol lowering medication?  No    Hypertension Follow-up    Do you check your blood pressure regularly outside of the clinic? Yes   Are you following a low salt diet? Yes  Are your blood pressures ever more than 140 on the top number (systolic) OR more   than 90 on the bottom number (diastolic), for example 140/90? No    Vascular Disease Follow-up    How often do you take nitroglycerin? Never  Do you take an aspirin every day? Yes    Review of Systems  Constitutional, HEENT, cardiovascular, pulmonary, GI, , musculoskeletal, neuro, skin, endocrine and psych systems are negative, except as otherwise noted.        Objective    /66 (BP Location: Right arm, Patient Position: Sitting, Cuff Size: Adult Large)   Pulse 56   Temp (!) 96.7  F (35.9  C) (Tympanic)   Resp 20   Ht 1.791 m (5' 10.5\")   Wt 107 kg (236 lb)   BMI 33.38 kg/m    Body mass index is 33.38 kg/m .  Physical Exam   GENERAL: alert and no distress  EYES: Eyes grossly normal to inspection  RESP: lungs clear to auscultation - no rales, rhonchi or wheezes  CV: regular rate and rhythm, normal S1 S2, no S3 or S4, no murmur, click or rub, no peripheral edema  MS:   RIGHT HIP PHYSICAL EXAMINATION:  General Hip Exam: no " signs of deformity. Skin intact, no signs of skin changes and/or previous trauma to the hip. Leg length appears to be equal bilaterally.     Gait: Patient is able to rise from a seated position. Patient is able to bear weight. No signs of compensation/guarding in gait. Negative for Trendelenburg gait pattern.     Palpation: tender to palpation over right greater trochanter    ROM: full flexion, extension, abduction, adduction, internal and external rotation    Strength: 5/5 lower extremity strength bilaterally    Special Testing:   Straight Leg Raise: negative (Lumbar spine)   MICHELLE/Figure 4: negative  (TFL)   Gabby: negative  (IT band)   Gait/Trendelenburg: see above   Leg length: see above    Neurovascular exam: distal pulses and sensation intact.   SKIN: no suspicious lesions or rashes  NEURO: Normal strength and tone, mentation intact and speech normal  BACK: no CVA tenderness, no paralumbar tenderness  PSYCH: mentation appears normal, affect normal/bright  LYMPH: normal ant/post cervical, supraclavicular nodes    Signed Electronically by: Erica Hackett PA-C

## 2024-07-08 NOTE — TELEPHONE ENCOUNTER
Needs OP PT to start 5-7 days post-surgery. Manolo  9/23/24 left shoulder scope ,subacromial decompression,rotator cuff repair, possible biceps tenodesis.

## 2024-07-08 NOTE — NURSING NOTE
"Patient presents to the clinic for medication management.    FOOD SECURITY SCREENING QUESTIONS:    The next two questions are to help us understand your food security.  If you are feeling you need any assistance in this area, we have resources available to support you today.    Hunger Vital Signs:  Within the past 12 months we worried whether our food would run out before we got money to buy more. Never  Within the past 12 months the food we bought just didn't last and we didn't have money to get more. Never    Advance Care Directive on file? yes  Advance Care Directive provided to patient? N/a      Chief Complaint   Patient presents with    Clinic Care Coordination - Follow-up     Medication management.       Initial /66 (BP Location: Right arm, Patient Position: Sitting, Cuff Size: Adult Large)   Pulse 56   Temp (!) 96.7  F (35.9  C) (Tympanic)   Resp 20   Ht 1.791 m (5' 10.5\")   Wt 107 kg (236 lb)   BMI 33.38 kg/m   Estimated body mass index is 33.38 kg/m  as calculated from the following:    Height as of this encounter: 1.791 m (5' 10.5\").    Weight as of this encounter: 107 kg (236 lb).  Medication Reconciliation: complete        Fariba Sloan LPN     "

## 2024-07-19 DIAGNOSIS — R73.03 PREDIABETES: ICD-10-CM

## 2024-07-19 NOTE — TELEPHONE ENCOUNTER
MARICEL Maldonado,   Pharmacy states this was made inactive and they need a new prescription sent.   Sayra Jackson, RN on 7/19/2024 at 4:18 PM       [facility] the following:      Requested Prescriptions   Pending Prescriptions Disp Refills    blood glucose (NO BRAND SPECIFIED) test strip 100 strip 3     Sig: Use to test blood sugar 1 times daily. Dx: R73.03       Diabetic Supplies Protocol Failed - 7/19/2024  4:16 PM        Failed - Medication indicated for associated diagnosis       Last Prescription Date:   7/8/24  Last Fill Qty/Refills:         100, R-3    Last Office Visit:              7/8/24   Future Office visit:             Next 5 appointments (look out 90 days)      Sep 03, 2024 10:00 AM  (Arrive by 9:45 AM)  Pre-Operative Physical with Erica Jeronimo PA-C  [facility] ) [address]  [place] [facility] [address]  [phone]     Sep 30, 2024 10:00 AM  (Arrive by 9:45 AM)  Return Visit with Balbir French MD  [facility] ([facility] ) [address]  [place] [facility] [address]  [phone]     Oct 14, 2024 8:00 AM  (Arrive by 7:45 AM)  Adult Preventative Visit with Jamaal Valdivia MD  [facility] ([facility] ) [address]  [place] [facility] [address]  [phone]

## 2024-07-20 DIAGNOSIS — Z95.1 S/P CABG X 3: ICD-10-CM

## 2024-07-22 RX ORDER — POTASSIUM CHLORIDE 750 MG/1
TABLET, EXTENDED RELEASE ORAL
Qty: 180 TABLET | Refills: 0 | Status: SHIPPED | OUTPATIENT
Start: 2024-07-22

## 2024-07-22 NOTE — TELEPHONE ENCOUNTER
"Requested Prescriptions   Pending Prescriptions Disp Refills    potassium chloride taco ER (KLOR-CON M10) 10 MEQ CR tablet [Pharmacy Med Name: KLOR-CON M10 TABLET] 180 tablet 1     Sig: TAKE 2 TABS BY MOUTH ON DAYS YOU TAKE LASIX       Potassium Supplements Protocol Passed - 7/20/2024  1:31 PM        Passed - Medication is active on med list        Passed - Medication indicated for associated diagnosis     Potassium is associated with one of the following diagnoses:    Hypokalemia    Hypokalemia prophylaxis   Hypertension   Heart failure   Edema        Passed - Recent (12 mo) or future (90 days) visit within the authorizing provider's department     The patient must have completed an in-person or virtual visit within the past 12 months or has a future visit scheduled within the next 90 days with the authorizing provider s specialty.  Urgent care and e-visits do not quality as an office visit for this protocol.        Passed - Patient is age 18 or older        Passed - Normal serum potassium in past 12 months     Recent Labs   Lab Test 01/04/24  0902   POTASSIUM 4.6         Last Written Prescription Date:  6/13/24  Last Fill Quantity: 90,   # refills: 1  Last Office Visit: 6/13/24 and instructions state: \"Start taking Lasix 20 mg only when needed for increased edema/ fluid retention in legs. When you take lasix please take potassium supplement with Lasix dose.\"    Note from pharmacy states:  \"Pharmacy comment: REQUEST FOR 90 DAYS PRESCRIPTION. DX Code Needed    The following fields have changes from the original prescription:  Dispense  Sig\"    Future Office visit:    Next 5 appointments (look out 90 days)      Sep 03, 2024 10:00 AM  (Arrive by 9:45 AM)  Pre-Operative Physical with Erica Jeronimo PA-C  [facility] ([facility] ) [address]  [place] [address]  [phone]     Sep 30, 2024 10:00 AM  (Arrive by 9:45 AM)  Return Visit with " Balbir French MD  [facility] ([facility] ) [address]  [place] [address]  [phone]     Oct 14, 2024 8:00 AM  (Arrive by 7:45 AM)  Adult Preventative Visit with Jamaal Valdivia MD  [facility] ([facility] ) [address]  [place] [address]  [phone]     Prescription approved per [facility] Refill Protocol.  Francine Jimenez RN ....................  7/22/2024   11:11 AM

## 2024-07-26 DIAGNOSIS — R73.03 PREDIABETES: Primary | ICD-10-CM

## 2024-07-26 NOTE — TELEPHONE ENCOUNTER
[id number] [employer] [facility] [place] sent Rx request for the following:      Requested Prescriptions   Pending Prescriptions Disp Refills    blood glucose (NO BRAND SPECIFIED) test strip 100 strip 3     Sig: Use to test blood sugar 1 times daily. Dx: R73.03       Diabetic Supplies Protocol Failed - 7/26/2024  1:38 PM        Failed - Medication indicated for associated diagnosis        Last Prescription Date:   7/19/24  Last Fill Qty/Refills:         100, R-3           blood glucose monitoring (NO BRAND SPECIFIED) meter device kit 1 kit      Sig: Use to test blood sugar 1 time daily       Diabetic Supplies Protocol Failed - 7/26/2024  1:38 PM        Failed - Medication indicated for associated diagnosis        Last Prescription Date:   3/8/19  Last Fill Qty/Refills:         1, R-0           blood glucose (NO BRAND SPECIFIED) lancets standard       Sig: Use to test blood sugar 1 time daily       There is no refill protocol information for this order          Last Prescription Date:   12/14/22  Last Fill Qty/Refills:         102, R-3    Last Office Visit:              7/8/24   Future Office visit:           9/13/24    Routing refill request to provider for review/approval because:  Drug not on the [facility] refill protocol     Migdalia Kendall RN on 7/26/2024 at 1:46 PM

## 2024-09-13 ENCOUNTER — OFFICE VISIT (OUTPATIENT)
Dept: FAMILY MEDICINE | Facility: OTHER | Age: 69
End: 2024-09-13
Attending: PHYSICIAN ASSISTANT
Payer: COMMERCIAL

## 2024-09-13 VITALS
DIASTOLIC BLOOD PRESSURE: 88 MMHG | TEMPERATURE: 97 F | RESPIRATION RATE: 16 BRPM | BODY MASS INDEX: 33.79 KG/M2 | OXYGEN SATURATION: 96 % | HEART RATE: 58 BPM | HEIGHT: 70 IN | SYSTOLIC BLOOD PRESSURE: 138 MMHG | WEIGHT: 236 LBS

## 2024-09-13 DIAGNOSIS — M75.102 TEAR OF LEFT SUPRASPINATUS TENDON: ICD-10-CM

## 2024-09-13 DIAGNOSIS — I10 ESSENTIAL HYPERTENSION: ICD-10-CM

## 2024-09-13 DIAGNOSIS — D69.6 DISORDER INVOLVING THROMBOCYTOPENIA (H): ICD-10-CM

## 2024-09-13 DIAGNOSIS — Z95.1 S/P CABG X 3: ICD-10-CM

## 2024-09-13 DIAGNOSIS — G47.33 OSA (OBSTRUCTIVE SLEEP APNEA): ICD-10-CM

## 2024-09-13 DIAGNOSIS — M67.814 TENDINOSIS OF LEFT SHOULDER: ICD-10-CM

## 2024-09-13 DIAGNOSIS — R73.03 PREDIABETES: ICD-10-CM

## 2024-09-13 DIAGNOSIS — Z01.818 PREOP GENERAL PHYSICAL EXAM: Primary | ICD-10-CM

## 2024-09-13 DIAGNOSIS — E78.5 HYPERLIPIDEMIA LDL GOAL <70: ICD-10-CM

## 2024-09-13 LAB
ANION GAP SERPL CALCULATED.3IONS-SCNC: 9 MMOL/L (ref 7–15)
BASOPHILS # BLD AUTO: 0 10E3/UL (ref 0–0.2)
BASOPHILS NFR BLD AUTO: 0 %
BUN SERPL-MCNC: 22.8 MG/DL (ref 8–23)
CALCIUM SERPL-MCNC: 9.3 MG/DL (ref 8.8–10.4)
CHLORIDE SERPL-SCNC: 105 MMOL/L (ref 98–107)
CREAT SERPL-MCNC: 1.05 MG/DL (ref 0.67–1.17)
EGFRCR SERPLBLD CKD-EPI 2021: 77 ML/MIN/1.73M2
EOSINOPHIL # BLD AUTO: 0.1 10E3/UL (ref 0–0.7)
EOSINOPHIL NFR BLD AUTO: 1 %
ERYTHROCYTE [DISTWIDTH] IN BLOOD BY AUTOMATED COUNT: 13.4 % (ref 10–15)
GLUCOSE SERPL-MCNC: 108 MG/DL (ref 70–99)
HBA1C MFR BLD: 5.7 % (ref 4–6.2)
HCO3 SERPL-SCNC: 27 MMOL/L (ref 22–29)
HCT VFR BLD AUTO: 43.2 % (ref 40–53)
HGB BLD-MCNC: 14.8 G/DL (ref 13.3–17.7)
IMM GRANULOCYTES # BLD: 0 10E3/UL
IMM GRANULOCYTES NFR BLD: 0 %
LYMPHOCYTES # BLD AUTO: 2.6 10E3/UL (ref 0.8–5.3)
LYMPHOCYTES NFR BLD AUTO: 55 %
MCH RBC QN AUTO: 33.3 PG (ref 26.5–33)
MCHC RBC AUTO-ENTMCNC: 34.3 G/DL (ref 31.5–36.5)
MCV RBC AUTO: 97 FL (ref 78–100)
MONOCYTES # BLD AUTO: 0.2 10E3/UL (ref 0–1.3)
MONOCYTES NFR BLD AUTO: 4 %
NEUTROPHILS # BLD AUTO: 1.8 10E3/UL (ref 1.6–8.3)
NEUTROPHILS NFR BLD AUTO: 39 %
NRBC # BLD AUTO: 0 10E3/UL
NRBC BLD AUTO-RTO: 0 /100
PLATELET # BLD AUTO: 78 10E3/UL (ref 150–450)
POTASSIUM SERPL-SCNC: 4.1 MMOL/L (ref 3.4–5.3)
RBC # BLD AUTO: 4.44 10E6/UL (ref 4.4–5.9)
SODIUM SERPL-SCNC: 141 MMOL/L (ref 135–145)
WBC # BLD AUTO: 4.6 10E3/UL (ref 4–11)

## 2024-09-13 PROCEDURE — 80048 BASIC METABOLIC PNL TOTAL CA: CPT | Mod: ZL | Performed by: PHYSICIAN ASSISTANT

## 2024-09-13 PROCEDURE — G0463 HOSPITAL OUTPT CLINIC VISIT: HCPCS | Mod: 25

## 2024-09-13 PROCEDURE — 93005 ELECTROCARDIOGRAM TRACING: CPT | Performed by: PHYSICIAN ASSISTANT

## 2024-09-13 PROCEDURE — 93010 ELECTROCARDIOGRAM REPORT: CPT | Performed by: STUDENT IN AN ORGANIZED HEALTH CARE EDUCATION/TRAINING PROGRAM

## 2024-09-13 PROCEDURE — 83036 HEMOGLOBIN GLYCOSYLATED A1C: CPT | Mod: ZL | Performed by: PHYSICIAN ASSISTANT

## 2024-09-13 PROCEDURE — 36415 COLL VENOUS BLD VENIPUNCTURE: CPT | Mod: ZL | Performed by: PHYSICIAN ASSISTANT

## 2024-09-13 PROCEDURE — 90662 IIV NO PRSV INCREASED AG IM: CPT

## 2024-09-13 PROCEDURE — 99214 OFFICE O/P EST MOD 30 MIN: CPT | Performed by: PHYSICIAN ASSISTANT

## 2024-09-13 PROCEDURE — G2211 COMPLEX E/M VISIT ADD ON: HCPCS | Performed by: PHYSICIAN ASSISTANT

## 2024-09-13 PROCEDURE — 85025 COMPLETE CBC W/AUTO DIFF WBC: CPT | Mod: ZL | Performed by: PHYSICIAN ASSISTANT

## 2024-09-13 ASSESSMENT — PAIN SCALES - GENERAL: PAINLEVEL: NO PAIN (0)

## 2024-09-13 NOTE — PATIENT INSTRUCTIONS
How to Take Your Medication Before Surgery  Preoperative Medication Instructions   - Continue: Metoprolol, Crestor  - Hold Lisinopril and Lasix the morning of surgery  - Hold - vitamins and supplements for 10-14 days prior to surgery  - Hold NSAIDS (Ibuprofen, Naproxen, Aleve) for 7-days prior to surgery. Tylenol is OK.       Patient Education   Preparing for Your Surgery  Getting started  A nurse will call you to review your health history and instructions. They will give you an arrival time based on your scheduled surgery time. Please be ready to share:  Your doctor's clinic name and phone number  Your medical, surgical, and anesthesia history  A list of allergies and sensitivities  A list of medicines, including herbal treatments and over-the-counter drugs  Whether the patient has a legal guardian (ask how to send us the papers in advance)  Please tell us if you're pregnant--or if there's any chance you might be pregnant. Some surgeries may injure a fetus (unborn baby), so they require a pregnancy test. Surgeries that are safe for a fetus don't always need a test, and you can choose whether to have one.   If you have a child who's having surgery, please ask for a copy of Preparing for Your Child's Surgery.    Preparing for surgery  Within 10 to 30 days of surgery: Have a pre-op exam (sometimes called an H&P, or History and Physical). This can be done at a clinic or pre-operative center.  If you're having a , you may not need this exam. Talk to your care team.  At your pre-op exam, talk to your care team about all medicines you take. If you need to stop any medicines before surgery, ask when to start taking them again.  We do this for your safety. Many medicines can make you bleed too much during surgery. Some change how well surgery (anesthesia) drugs work.  Call your insurance company to let them know you're having surgery. (If you don't have insurance, call [phone].)  Call your clinic if there's  any change in your health. This includes signs of a cold or flu (sore throat, runny nose, cough, rash, fever). It also includes a scrape or scratch near the surgery site.  If you have questions on the day of surgery, call your hospital or surgery center.  Eating and drinking guidelines  For your safety: Unless your surgeon tells you otherwise, follow the guidelines below.  Eat and drink as usual until 8 hours before you arrive for surgery. After that, no food or milk.  Drink clear liquids until 2 hours before you arrive. These are liquids you can see through, like water, Gatorade, and Propel Water. They also include plain black coffee and tea (no cream or milk), candy, and breath mints. You can spit out gum when you arrive.  If you drink alcohol: Stop drinking it the night before surgery.  If your care team tells you to take medicine on the morning of surgery, it's okay to take it with a sip of water.  Preventing infection  Shower or bathe the night before and morning of your surgery. Follow the instructions your clinic gave you. (If no instructions, use regular soap.)  Don't shave or clip hair near your surgery site. We'll remove the hair if needed.  Don't smoke or vape the morning of surgery. You may chew nicotine gum up to 2 hours before surgery. A nicotine patch is okay.  Note: Some surgeries require you to completely quit smoking and nicotine. Check with your surgeon.  Your care team will make every effort to keep you safe from infection. We will:  Clean our hands often with soap and water (or an alcohol-based hand rub).  Clean the skin at your surgery site with a special soap that kills germs.  Give you a special gown to keep you warm. (Cold raises the risk of infection.)  Wear special hair covers, masks, gowns and gloves during surgery.  Give antibiotic medicine, if prescribed. Not all surgeries need antibiotics.  What to bring on the day of surgery  Photo ID and insurance card  Copy of your health care  directive, if you have one  Glasses and hearing aids (bring cases)  You can't wear contacts during surgery  Inhaler and eye drops, if you use them (tell us about these when you arrive)  CPAP machine or breathing device, if you use them  A few personal items, if spending the night  If you have . . .  A pacemaker, ICD (cardiac defibrillator) or other implant: Bring the ID card.  An implanted stimulator: Bring the remote control.  A legal guardian: Bring a copy of the certified (court-stamped) guardianship papers.  Please remove any jewelry, including body piercings. Leave jewelry and other valuables at home.  If you're going home the day of surgery  You must have a responsible adult drive you home. They should stay with you overnight as well.  If you don't have someone to stay with you, and you aren't safe to go home alone, we may keep you overnight. Insurance often won't pay for this.  After surgery  If it's hard to control your pain or you need more pain medicine, please call your surgeon's office.  Questions?   If you have any questions for your care team, list them here: _________________________________________________________________________________________________________________________________________________________________________ ____________________________________ ____________________________________ ____________________________________  For informational purposes only. Not to replace the advice of your health care provider. Copyright   2003, 2019 [facility]. All rights reserved. Clinically reviewed by Tereza Wilkinson MD. [facility] Com [id number] - REV 12/22.

## 2024-09-13 NOTE — NURSING NOTE
"Patient comes in for pre-op for left  should surgery on 9/23/24 with Dr French.    Chief Complaint   Patient presents with    Pre-Op Exam       Initial /88 (BP Location: Right arm, Patient Position: Sitting, Cuff Size: Adult Large)   Pulse 58   Temp 97  F (36.1  C) (Temporal)   Resp 16   Ht 1.78 m (5' 10.08\")   Wt 107 kg (236 lb)   SpO2 96%   BMI 33.79 kg/m   Estimated body mass index is 33.79 kg/m  as calculated from the following:    Height as of this encounter: 1.78 m (5' 10.08\").    Weight as of this encounter: 107 kg (236 lb).  Meds Reconciled: complete    Joyce Crespo LPN      "

## 2024-09-13 NOTE — H&P (VIEW-ONLY)
Preoperative Evaluation  [facility]  [address]  [place] [address]  Phone: [phone]  Fax: [phone]  Primary Provider: Erica Jeronimo PA-C  Pre-op Performing Provider: Erica Jeronimo PA-C  Sep 13, 2024         9/12/2024   Surgical Information   What procedure is being done? Pre-op visit for left rotator cuff surgery.   Facility or Hospital where procedure/surgery will be performed: [facility]   Who is doing the procedure / surgery? Dr Balbir Kim   Date of surgery / procedure: September 23, 2024/Left rotator cuff   Time of surgery / procedure: Not yet established   Where do you plan to recover after surgery? at home with family        Fax number for surgical facility: Note does not need to be faxed, will be available electronically in Epic.    Assessment & Plan     The proposed surgical procedure is considered INTERMEDIATE risk.      ICD-10-CM    1. Preop general physical exam  Z01.818 CBC and Differential     Basic Metabolic Panel     EKG 12-lead, tracing only (Same Day)     CBC and Differential     Basic Metabolic Panel      2. Tendinosis of left shoulder  M67.814       3. Tear of left supraspinatus tendon  M75.102       4. S/P CABG x 3 (9/22/2023)  Z95.1       5. Essential hypertension  I10       6. Hyperlipidemia LDL goal <70  E78.5       7. Prediabetes  R73.03 Hemoglobin A1c     Hemoglobin A1c      8. ANGEL (obstructive sleep apnea)  G47.33       9. Disorder involving thrombocytopenia (H24)  D69.6 CBC and Differential         Vital signs are stable. Patient presented for preoperative evaluation prior to upcoming proposed procedure. Lab work updated today including CBC and BMP. CBC - hemoglobin 14.8, hematocrit 43.2. BMP - GFR 77, creatinine 1.05. EKG indicated and ordered. Reviewed hold times as outlined below. Patient is in agreement and understanding of the above treatment plan. All questions and concerns were addressed and answered to patient's  satisfaction. AVS reviewed with patient.   Chronic left shoulder pain - Rational for upcoming proposed procedure. Patient aware of risks and benefits. Surgical soap if applicable was provided. Patient will follow postoperative with surgeon.   Supraspinatus tear of left shoulder - rationale for procedure, see #2.   S/p CABG - on beta blocker and lasix - stable. Cleared from cardiac perspective. continues with lifestyle modifications - aggressive blood pressure and lipid management. Ongoing cardiology follow up.   Hypertension - stable on Lisinopril 5 mg daily. Stable electrolyte and renal function on BMP. Hold Lisinopril AM of surgery as outlined below, OK to resume next day.  Hyperlipidemia - stable. Continue Crestor 40 mg daily + lifestyle modifications. Recommend increased lean proteins, fruits and vegetable intake. Goal exercise 150 minutes of moderate exercise per week (walking is great exercise).   Prediabetes - controlled with diet. A1c 5.7% today. Recommend increased lean proteins, fruits and vegetable intake. Goal exercise 150 minutes of moderate exercise per week (walking is great exercise).   ANGEL - stable with mandibular  + lifestyle modifications.   Thrombocytopenia - known intermittent episodes over the years. Platelets 78 today. Hemoglobin, hematocrit and RBC indices otherwise stable. No NSAIDs or triggering agents. Recent flu shot immunization. Unlikely to be statin. Recheck in 4-weeks (may be low due to upcoming surgery, but will continue to trend)     - No identified additional risk factors other than previously addressed    Preoperative Medication Instructions  - Continue: Metoprolol, Crestor  - Hold Lisinopril and Lasix the morning of surgery  - Hold - vitamins and supplements for 10-14 days prior to surgery  - Hold NSAIDS (Ibuprofen, Naproxen, Aleve) for 7-days prior to surgery. Tylenol is OK.    Recommendation  Approval given to proceed with proposed procedure, without further  diagnostic evaluation.    The longitudinal plan of care for the diagnosis(es)/condition(s) as documented were addressed during this visit. Due to the added complexity in care, I will continue to support Kirk in the subsequent management and with ongoing continuity of care.    Ivelisse Bradley is a 69 year old, presenting for the following:  Pre-Op Exam        7/8/2024     7:53 AM   Additional Questions   Roomed by eli gan lpn     HPI related to upcoming procedure: Kirk presents to the clinic today for preoperative physical - he scheduled for a left shoulder arthroscopy, subacromial decompression, rotator cuff repair or biceps tenodesis on 9/23/24 with Dr. Balbir French MD. He has been suffering from chronic shoulder pain for years, surgery was held off on due to cardiac health including 3V CABG, PFO closure on 9/22/23 at [facility] and history of TIA with right ICA stenting 5/1/23, but he is now optimized. Well controlled ANGEL with mandibular  (did not tolerate CPAP due to claustrophobia).         9/12/2024   Pre-Op Questionnaire   Have you ever had a heart attack or stroke? (!) YES - heart attack   Have you ever had surgery on your heart or blood vessels, such as a stent placement, a coronary artery bypass, or surgery on an artery in your head, neck, heart, or legs? (!) YES - stents   Do you have chest pain with activity? No   Do you have a history of heart failure? (!) YES - stable, cardiology follow up   Do you currently have a cold, bronchitis or symptoms of other infection? No   Do you have a cough, shortness of breath, or wheezing? No   Do you or anyone in your family have previous history of blood clots? (!) YES - family   Do you or does anyone in your family have a serious bleeding problem such as prolonged bleeding following surgeries or cuts? (!) UNKNOWN    Have you ever had problems with anemia or been told to take iron pills? No   Have you had any abnormal blood loss such as black, tarry or  bloody stools? No   Have you ever had a blood transfusion? (!) YES   Have you ever had a transfusion reaction? (!) UNKNOWN - none known   Are you willing to have a blood transfusion if it is medically needed before, during, or after your surgery? Yes   Have you or any of your relatives ever had problems with anesthesia? No   Do you have sleep apnea, excessive snoring or daytime drowsiness? (!) UNKNOWN   Do you have any artifical heart valves or other implanted medical devices like a pacemaker, defibrillator, or continuous glucose monitor? No   Do you have artificial joints? (!) YES   Are you allergic to latex? No        Health Care Directive  Patient has a Health Care Directive on file    Preoperative Review of    reviewed - no record of controlled substances prescribed.    Status of Chronic Conditions:  See problem list for active medical problems.  Problems all longstanding and stable, except as noted/documented.  See ROS for pertinent symptoms related to these conditions.    Patient Active Problem List    Diagnosis Date Noted    Sleep apnea, unspecified type 01/04/2024     Priority: Medium    Essential hypertension 11/04/2023     Priority: Medium    Coronary artery disease involving autologous artery coronary bypass graft without angina pectoris 11/04/2023     Priority: Medium    Disorder involving thrombocytopenia (H24) 11/01/2023     Priority: Medium    Rheumatoid arthritis involving multiple sites with positive rheumatoid factor (H) 10/29/2023     Priority: Medium    S/P CABG x 3 (9/22/2023) 10/11/2023     Priority: Medium    History of coronary artery stent placement (2012) 10/11/2023     Priority: Medium    S/P patent foramen ovale closure (9/22/2023) 10/11/2023     Priority: Medium    Hyperlipidemia LDL goal <70 10/11/2023     Priority: Medium    Postoperative atrial fibrillation (H) 09/22/2023     Priority: Medium    Obstructive sleep apnea syndrome in adult 09/18/2023     Priority: Medium    Patent  foramen ovale 09/18/2023     Priority: Medium    Personal history of tobacco use, presenting hazards to health 09/18/2023     Priority: Medium    Chronic left shoulder pain 09/14/2023     Priority: Medium    Traumatic incomplete tear of left rotator cuff, initial encounter 09/14/2023     Priority: Medium    Chronic anticoagulation 05/08/2023     Priority: Medium    Internal carotid artery stent present 05/08/2023     Priority: Medium    H/O amaurosis fugax 05/08/2023     Priority: Medium    Presence of other vascular implants and grafts 05/08/2023     Priority: Medium    History of right common carotid artery stent placement 05/02/2023     Priority: Medium    CKD (chronic kidney disease) stage 2, GFR 60-89 ml/min 12/14/2022     Priority: Medium    Degeneration of intervertebral disc 02/01/2018     Priority: Medium     Overview:   With back pain      Psychosexual dysfunction with inhibited sexual excitement 02/01/2018     Priority: Medium    Hypercholesterolemia 02/01/2018     Priority: Medium    Prediabetes 02/01/2018     Priority: Medium    Diverticulosis of sigmoid colon 01/25/2016     Priority: Medium    Health care maintenance 01/21/2016     Priority: Medium    Coronary atherosclerosis 09/08/2013     Priority: Medium     Overview:   August 2012 PRAVIN stent placed at [facility] in [place]. Moderate diffuse disease in LAD and Circumflex.       Hip pain 07/03/2012     Priority: Medium    Tick bite 04/25/2011     Priority: Medium      Past Medical History:   Diagnosis Date    Abnormal result of other cardiovascular function study (CODE)     No Comments Provided    Atherosclerotic heart disease of native coronary artery without angina pectoris     No Comments Provided    Chest pain     No Comments Provided    Essential (primary) hypertension     No Comments Provided    Hyperlipidemia     No Comments Provided    Morbid obesity (H)     Osteoarthritis     No Comments Provided     Past Surgical History:   Procedure Laterality Date     ARTHROPLASTY HIP      5/2007,Left total hip with Dr Graham Escalante    COLONOSCOPY      6/3/05,Colonoscopy, normal.  F/u in 10 years    COLONOSCOPY  01/25/2016    Normal exam,  F/U 2026    CV CARDIAC CATHERIZATION  08/31/2012    proximal circumflex, [facility]    RELEASE CARPAL TUNNEL      5/04,Right carpal tunnel sugery    TONSILLECTOMY, ADENOIDECTOMY, COMBINED      T&A as a child     Current Outpatient Medications   Medication Sig Dispense Refill    acetaminophen (TYLENOL) 500 MG tablet Take 500-1,000 mg by mouth every 6 hours as needed for mild pain Take at bedtime as needed for shoulder pain      aspirin 81 MG EC tablet Take 81 mg by mouth daily      blood glucose (NO BRAND SPECIFIED) lancets standard Use to test blood sugar 1 time daily 100 Lancet 3    blood glucose (NO BRAND SPECIFIED) test strip Use to test blood sugar 1 times daily. Dx: R73.03 100 strip 3    blood glucose monitoring (ACCU-CHEK ENEDINA PLUS) meter device kit Use to test blood sugar 1 times daily. Dx: R73.03 1 kit 0    blood glucose monitoring (ACCU-CHEK MULTICLIX) lancets Use to test blood sugar 1 times daily. Dx: R73.03 102 each 3    blood glucose monitoring (NO BRAND SPECIFIED) meter device kit Use to test blood sugar 1 time daily 1 kit 0    cholecalciferol (VITAMIN D3) 125 mcg (5000 units) capsule Take 125 mcg by mouth daily      furosemide (LASIX) 20 MG tablet Take lasix 20 mg in the morning only if needed for increased edema in legs. 90 tablet 1    lisinopril (ZESTRIL) 5 MG tablet Take 1 tablet (5 mg) by mouth daily 90 tablet 4    metoprolol succinate ER (TOPROL XL) 25 MG 24 hr tablet Take 0.5 tablets (12.5 mg) by mouth daily 45 tablet 3    Multiple Vitamins-Minerals (MULTIVITAMIN & MINERAL PO) Take 1 tablet by mouth      nitroGLYcerin (NITROSTAT) 0.4 MG sublingual tablet PLACE 1 TABLET UNDER THE TONGUE EVERY 5 MINUTES IF NEEDED FOR CHEST PAIN. 25 tablet 0    potassium chloride taco ER (KLOR-CON M10) 10 MEQ CR tablet TAKE 2 TABS BY MOUTH ON  "DAYS YOU TAKE LASIX 180 tablet 0    rosuvastatin (CRESTOR) 40 MG tablet Take 1 tablet (40 mg) by mouth daily 90 tablet 4    sildenafil (VIAGRA) 50 MG tablet Take 1 tablet (50 mg) by mouth daily as needed (Erectile dysfunction) 10 tablet 11       Allergies   Allergen Reactions    Poison Ivy Extract Other (See Comments)     Hospitalized as a child, whole body reaction including the throat.        Social History     Tobacco Use    Smoking status: Former     Current packs/day: 0.00     Average packs/day: 1 pack/day for 9.8 years (9.8 ttl pk-yrs)     Types: Cigarettes     Start date:      Quit date: 10/11/1984     Years since quittin.9     Passive exposure: Never    Smokeless tobacco: Never    Tobacco comments:     Last smoked over 40 years ago-early 's   Substance Use Topics    Alcohol use: Yes     Comment: 2 a week     Family History   Problem Relation Age of Onset    Cancer Father         Cancer,Lung    Peripheral Vascular Disease Father     Heart Disease Mother 70        Heart Disease,CAD/MI    Pacemaker Sister     Heart Disease Maternal Grandfather 60        Heart Disease,CAD    Heart Disease Maternal Uncle 60        Heart Disease,CAD    No Known Problems Sister     Diabetes No family hx of     Prostate Cancer No family hx of     Colon Cancer No family hx of      History   Drug Use Unknown     Review of Systems  Constitutional, HEENT, cardiovascular, pulmonary, GI, , musculoskeletal, neuro, skin, endocrine and psych systems are negative, except as otherwise noted.    Objective    /88 (BP Location: Right arm, Patient Position: Sitting, Cuff Size: Adult Large)   Pulse 58   Temp 97  F (36.1  C) (Temporal)   Resp 16   Ht 1.78 m (5' 10.08\")   Wt 107 kg (236 lb)   SpO2 96%   BMI 33.79 kg/m     Estimated body mass index is 33.79 kg/m  as calculated from the following:    Height as of this encounter: 1.78 m (5' 10.08\").    Weight as of this encounter: 107 kg (236 lb).  Physical Exam  GENERAL: alert " and no distress  EYES: Eyes grossly normal to inspection, PERRL and conjunctivae and sclerae normal  NECK: no adenopathy, no asymmetry, masses, or scars  RESP: lungs clear to auscultation - no rales, rhonchi or wheezes  CV: regular rate and rhythm, normal S1 S2, no S3 or S4, no murmur, click or rub, no peripheral edema  MS: no gross musculoskeletal defects noted, no edema  SKIN: no suspicious lesions or rashes  NEURO: Normal strength and tone, mentation intact and speech normal  PSYCH: mentation appears normal, affect normal/bright    Recent Labs   Lab Test 01/04/24  0902 12/14/23  1006   HGB 14.0 13.3   * 166    137   POTASSIUM 4.6 4.6   CR 0.85 0.88   A1C  --  5.5      Diagnostics  Recent Results (from the past 24 hour(s))   EKG 12-lead, tracing only (Same Day)    Collection Time: 09/13/24  3:04 PM   Result Value Ref Range    Systolic Blood Pressure  mmHg    Diastolic Blood Pressure  mmHg    Ventricular Rate 66 BPM    Atrial Rate 66 BPM    SC Interval 238 ms    QRS Duration 110 ms     ms    QTc 440 ms    P Axis 44 degrees    R AXIS -1 degrees    T Axis 49 degrees    Interpretation ECG       Sinus rhythm with 1st degree A-V block with occasional Premature ventricular complexes  Possible Left atrial enlargement  Nonspecific T wave abnormality  Abnormal ECG  When compared with ECG of 11-Oct-2023 14:59,  Premature ventricular complexes are now Present  Questionable change in QRS axis  T wave inversion no longer evident in Inferior leads  Nonspecific T wave abnormality no longer evident in Anterior leads     Basic Metabolic Panel    Collection Time: 09/13/24  3:36 PM   Result Value Ref Range    Sodium 141 135 - 145 mmol/L    Potassium 4.1 3.4 - 5.3 mmol/L    Chloride 105 98 - 107 mmol/L    Carbon Dioxide (CO2) 27 22 - 29 mmol/L    Anion Gap 9 7 - 15 mmol/L    Urea Nitrogen 22.8 8.0 - 23.0 mg/dL    Creatinine 1.05 0.67 - 1.17 mg/dL    GFR Estimate 77 >60 mL/min/1.73m2    Calcium 9.3 8.8 - 10.4 mg/dL     Glucose 108 (H) 70 - 99 mg/dL   Hemoglobin A1c    Collection Time: 09/13/24  3:36 PM   Result Value Ref Range    Hemoglobin A1C 5.7 4.0 - 6.2 %   CBC with platelets and differential    Collection Time: 09/13/24  3:36 PM   Result Value Ref Range    WBC Count 4.6 4.0 - 11.0 10e3/uL    RBC Count 4.44 4.40 - 5.90 10e6/uL    Hemoglobin 14.8 13.3 - 17.7 g/dL    Hematocrit 43.2 40.0 - 53.0 %    MCV 97 78 - 100 fL    MCH 33.3 (H) 26.5 - 33.0 pg    MCHC 34.3 31.5 - 36.5 g/dL    RDW 13.4 10.0 - 15.0 %    Platelet Count 78 (L) 150 - 450 10e3/uL    % Neutrophils 39 %    % Lymphocytes 55 %    % Monocytes 4 %    % Eosinophils 1 %    % Basophils 0 %    % Immature Granulocytes 0 %    NRBCs per 100 WBC 0 <1 /100    Absolute Neutrophils 1.8 1.6 - 8.3 10e3/uL    Absolute Lymphocytes 2.6 0.8 - 5.3 10e3/uL    Absolute Monocytes 0.2 0.0 - 1.3 10e3/uL    Absolute Eosinophils 0.1 0.0 - 0.7 10e3/uL    Absolute Basophils 0.0 0.0 - 0.2 10e3/uL    Absolute Immature Granulocytes 0.0 <=0.4 10e3/uL    Absolute NRBCs 0.0 10e3/uL      EKG: Sinus rhythm with 1st degree A-V block with occasional Premature ventricular complexes, possible left atrial enlargement, non specific T wave abnormality - unchanged from previous EKG/tracing on 10/11/23    Revised Cardiac Risk Index (RCRI)  The patient has the following serious cardiovascular risks for perioperative complications:   - Congestive Heart Failure (pulmonary edema, PND, s3 faraz, CXR with pulmonary congestion, basilar rales) = 1 point   RCRI Interpretation: 1 point: Class II (low risk - 0.9% complication rate)    Signed Electronically by: Erica Jeronimo PA-C  A copy of this evaluation report is provided to the requesting physician.

## 2024-09-13 NOTE — PROGRESS NOTES
Preoperative Evaluation  [facility]  [address]  [place] [address]  Phone: [phone]  Fax: [phone]  Primary Provider: Erica Jeronimo PA-C  Pre-op Performing Provider: Erica Jeronimo PA-C  Sep 13, 2024         9/12/2024   Surgical Information   What procedure is being done? Pre-op visit for left rotator cuff surgery.   Facility or Hospital where procedure/surgery will be performed: [facility]   Who is doing the procedure / surgery? Dr Balbir Kim   Date of surgery / procedure: September 23, 2024/Left rotator cuff   Time of surgery / procedure: Not yet established   Where do you plan to recover after surgery? at home with family        Fax number for surgical facility: Note does not need to be faxed, will be available electronically in Epic.    Assessment & Plan     The proposed surgical procedure is considered INTERMEDIATE risk.      ICD-10-CM    1. Preop general physical exam  Z01.818 CBC and Differential     Basic Metabolic Panel     EKG 12-lead, tracing only (Same Day)     CBC and Differential     Basic Metabolic Panel      2. Tendinosis of left shoulder  M67.814       3. Tear of left supraspinatus tendon  M75.102       4. S/P CABG x 3 (9/22/2023)  Z95.1       5. Essential hypertension  I10       6. Hyperlipidemia LDL goal <70  E78.5       7. Prediabetes  R73.03 Hemoglobin A1c     Hemoglobin A1c      8. ANGEL (obstructive sleep apnea)  G47.33       9. Disorder involving thrombocytopenia (H24)  D69.6 CBC and Differential         Vital signs are stable. Patient presented for preoperative evaluation prior to upcoming proposed procedure. Lab work updated today including CBC and BMP. CBC - hemoglobin 14.8, hematocrit 43.2. BMP - GFR 77, creatinine 1.05. EKG indicated and ordered. Reviewed hold times as outlined below. Patient is in agreement and understanding of the above treatment plan. All questions and concerns were addressed and answered to patient's  satisfaction. AVS reviewed with patient.   Chronic left shoulder pain - Rational for upcoming proposed procedure. Patient aware of risks and benefits. Surgical soap if applicable was provided. Patient will follow postoperative with surgeon.   Supraspinatus tear of left shoulder - rationale for procedure, see #2.   S/p CABG - on beta blocker and lasix - stable. Cleared from cardiac perspective. continues with lifestyle modifications - aggressive blood pressure and lipid management. Ongoing cardiology follow up.   Hypertension - stable on Lisinopril 5 mg daily. Stable electrolyte and renal function on BMP. Hold Lisinopril AM of surgery as outlined below, OK to resume next day.  Hyperlipidemia - stable. Continue Crestor 40 mg daily + lifestyle modifications. Recommend increased lean proteins, fruits and vegetable intake. Goal exercise 150 minutes of moderate exercise per week (walking is great exercise).   Prediabetes - controlled with diet. A1c 5.7% today. Recommend increased lean proteins, fruits and vegetable intake. Goal exercise 150 minutes of moderate exercise per week (walking is great exercise).   ANGEL - stable with mandibular  + lifestyle modifications.   Thrombocytopenia - known intermittent episodes over the years. Platelets 78 today. Hemoglobin, hematocrit and RBC indices otherwise stable. No NSAIDs or triggering agents. Recent flu shot immunization. Unlikely to be statin. Recheck in 4-weeks (may be low due to upcoming surgery, but will continue to trend)     - No identified additional risk factors other than previously addressed    Preoperative Medication Instructions  - Continue: Metoprolol, Crestor  - Hold Lisinopril and Lasix the morning of surgery  - Hold - vitamins and supplements for 10-14 days prior to surgery  - Hold NSAIDS (Ibuprofen, Naproxen, Aleve) for 7-days prior to surgery. Tylenol is OK.    Recommendation  Approval given to proceed with proposed procedure, without further  diagnostic evaluation.    The longitudinal plan of care for the diagnosis(es)/condition(s) as documented were addressed during this visit. Due to the added complexity in care, I will continue to support Kirk in the subsequent management and with ongoing continuity of care.    Ivelisse Bradley is a 69 year old, presenting for the following:  Pre-Op Exam        7/8/2024     7:53 AM   Additional Questions   Roomed by eli gan lpn     HPI related to upcoming procedure: Kirk presents to the clinic today for preoperative physical - he scheduled for a left shoulder arthroscopy, subacromial decompression, rotator cuff repair or biceps tenodesis on 9/23/24 with Dr. Balbir French MD. He has been suffering from chronic shoulder pain for years, surgery was held off on due to cardiac health including 3V CABG, PFO closure on 9/22/23 at [facility] and history of TIA with right ICA stenting 5/1/23, but he is now optimized. Well controlled ANGEL with mandibular  (did not tolerate CPAP due to claustrophobia).         9/12/2024   Pre-Op Questionnaire   Have you ever had a heart attack or stroke? (!) YES - heart attack   Have you ever had surgery on your heart or blood vessels, such as a stent placement, a coronary artery bypass, or surgery on an artery in your head, neck, heart, or legs? (!) YES - stents   Do you have chest pain with activity? No   Do you have a history of heart failure? (!) YES - stable, cardiology follow up   Do you currently have a cold, bronchitis or symptoms of other infection? No   Do you have a cough, shortness of breath, or wheezing? No   Do you or anyone in your family have previous history of blood clots? (!) YES - family   Do you or does anyone in your family have a serious bleeding problem such as prolonged bleeding following surgeries or cuts? (!) UNKNOWN    Have you ever had problems with anemia or been told to take iron pills? No   Have you had any abnormal blood loss such as black, tarry or  bloody stools? No   Have you ever had a blood transfusion? (!) YES   Have you ever had a transfusion reaction? (!) UNKNOWN - none known   Are you willing to have a blood transfusion if it is medically needed before, during, or after your surgery? Yes   Have you or any of your relatives ever had problems with anesthesia? No   Do you have sleep apnea, excessive snoring or daytime drowsiness? (!) UNKNOWN   Do you have any artifical heart valves or other implanted medical devices like a pacemaker, defibrillator, or continuous glucose monitor? No   Do you have artificial joints? (!) YES   Are you allergic to latex? No        Health Care Directive  Patient has a Health Care Directive on file    Preoperative Review of    reviewed - no record of controlled substances prescribed.    Status of Chronic Conditions:  See problem list for active medical problems.  Problems all longstanding and stable, except as noted/documented.  See ROS for pertinent symptoms related to these conditions.    Patient Active Problem List    Diagnosis Date Noted    Sleep apnea, unspecified type 01/04/2024     Priority: Medium    Essential hypertension 11/04/2023     Priority: Medium    Coronary artery disease involving autologous artery coronary bypass graft without angina pectoris 11/04/2023     Priority: Medium    Disorder involving thrombocytopenia (H24) 11/01/2023     Priority: Medium    Rheumatoid arthritis involving multiple sites with positive rheumatoid factor (H) 10/29/2023     Priority: Medium    S/P CABG x 3 (9/22/2023) 10/11/2023     Priority: Medium    History of coronary artery stent placement (2012) 10/11/2023     Priority: Medium    S/P patent foramen ovale closure (9/22/2023) 10/11/2023     Priority: Medium    Hyperlipidemia LDL goal <70 10/11/2023     Priority: Medium    Postoperative atrial fibrillation (H) 09/22/2023     Priority: Medium    Obstructive sleep apnea syndrome in adult 09/18/2023     Priority: Medium    Patent  foramen ovale 09/18/2023     Priority: Medium    Personal history of tobacco use, presenting hazards to health 09/18/2023     Priority: Medium    Chronic left shoulder pain 09/14/2023     Priority: Medium    Traumatic incomplete tear of left rotator cuff, initial encounter 09/14/2023     Priority: Medium    Chronic anticoagulation 05/08/2023     Priority: Medium    Internal carotid artery stent present 05/08/2023     Priority: Medium    H/O amaurosis fugax 05/08/2023     Priority: Medium    Presence of other vascular implants and grafts 05/08/2023     Priority: Medium    History of right common carotid artery stent placement 05/02/2023     Priority: Medium    CKD (chronic kidney disease) stage 2, GFR 60-89 ml/min 12/14/2022     Priority: Medium    Degeneration of intervertebral disc 02/01/2018     Priority: Medium     Overview:   With back pain      Psychosexual dysfunction with inhibited sexual excitement 02/01/2018     Priority: Medium    Hypercholesterolemia 02/01/2018     Priority: Medium    Prediabetes 02/01/2018     Priority: Medium    Diverticulosis of sigmoid colon 01/25/2016     Priority: Medium    Health care maintenance 01/21/2016     Priority: Medium    Coronary atherosclerosis 09/08/2013     Priority: Medium     Overview:   August 2012 PRAVIN stent placed at [facility] in [place]. Moderate diffuse disease in LAD and Circumflex.       Hip pain 07/03/2012     Priority: Medium    Tick bite 04/25/2011     Priority: Medium      Past Medical History:   Diagnosis Date    Abnormal result of other cardiovascular function study (CODE)     No Comments Provided    Atherosclerotic heart disease of native coronary artery without angina pectoris     No Comments Provided    Chest pain     No Comments Provided    Essential (primary) hypertension     No Comments Provided    Hyperlipidemia     No Comments Provided    Morbid obesity (H)     Osteoarthritis     No Comments Provided     Past Surgical History:   Procedure Laterality Date     ARTHROPLASTY HIP      5/2007,Left total hip with Dr Graham Escalante    COLONOSCOPY      6/3/05,Colonoscopy, normal.  F/u in 10 years    COLONOSCOPY  01/25/2016    Normal exam,  F/U 2026    CV CARDIAC CATHERIZATION  08/31/2012    proximal circumflex, [facility]    RELEASE CARPAL TUNNEL      5/04,Right carpal tunnel sugery    TONSILLECTOMY, ADENOIDECTOMY, COMBINED      T&A as a child     Current Outpatient Medications   Medication Sig Dispense Refill    acetaminophen (TYLENOL) 500 MG tablet Take 500-1,000 mg by mouth every 6 hours as needed for mild pain Take at bedtime as needed for shoulder pain      aspirin 81 MG EC tablet Take 81 mg by mouth daily      blood glucose (NO BRAND SPECIFIED) lancets standard Use to test blood sugar 1 time daily 100 Lancet 3    blood glucose (NO BRAND SPECIFIED) test strip Use to test blood sugar 1 times daily. Dx: R73.03 100 strip 3    blood glucose monitoring (ACCU-CHEK ENEDINA PLUS) meter device kit Use to test blood sugar 1 times daily. Dx: R73.03 1 kit 0    blood glucose monitoring (ACCU-CHEK MULTICLIX) lancets Use to test blood sugar 1 times daily. Dx: R73.03 102 each 3    blood glucose monitoring (NO BRAND SPECIFIED) meter device kit Use to test blood sugar 1 time daily 1 kit 0    cholecalciferol (VITAMIN D3) 125 mcg (5000 units) capsule Take 125 mcg by mouth daily      furosemide (LASIX) 20 MG tablet Take lasix 20 mg in the morning only if needed for increased edema in legs. 90 tablet 1    lisinopril (ZESTRIL) 5 MG tablet Take 1 tablet (5 mg) by mouth daily 90 tablet 4    metoprolol succinate ER (TOPROL XL) 25 MG 24 hr tablet Take 0.5 tablets (12.5 mg) by mouth daily 45 tablet 3    Multiple Vitamins-Minerals (MULTIVITAMIN & MINERAL PO) Take 1 tablet by mouth      nitroGLYcerin (NITROSTAT) 0.4 MG sublingual tablet PLACE 1 TABLET UNDER THE TONGUE EVERY 5 MINUTES IF NEEDED FOR CHEST PAIN. 25 tablet 0    potassium chloride taco ER (KLOR-CON M10) 10 MEQ CR tablet TAKE 2 TABS BY MOUTH ON  "DAYS YOU TAKE LASIX 180 tablet 0    rosuvastatin (CRESTOR) 40 MG tablet Take 1 tablet (40 mg) by mouth daily 90 tablet 4    sildenafil (VIAGRA) 50 MG tablet Take 1 tablet (50 mg) by mouth daily as needed (Erectile dysfunction) 10 tablet 11       Allergies   Allergen Reactions    Poison Ivy Extract Other (See Comments)     Hospitalized as a child, whole body reaction including the throat.        Social History     Tobacco Use    Smoking status: Former     Current packs/day: 0.00     Average packs/day: 1 pack/day for 9.8 years (9.8 ttl pk-yrs)     Types: Cigarettes     Start date:      Quit date: 10/11/1984     Years since quittin.9     Passive exposure: Never    Smokeless tobacco: Never    Tobacco comments:     Last smoked over 40 years ago-early 's   Substance Use Topics    Alcohol use: Yes     Comment: 2 a week     Family History   Problem Relation Age of Onset    Cancer Father         Cancer,Lung    Peripheral Vascular Disease Father     Heart Disease Mother 70        Heart Disease,CAD/MI    Pacemaker Sister     Heart Disease Maternal Grandfather 60        Heart Disease,CAD    Heart Disease Maternal Uncle 60        Heart Disease,CAD    No Known Problems Sister     Diabetes No family hx of     Prostate Cancer No family hx of     Colon Cancer No family hx of      History   Drug Use Unknown     Review of Systems  Constitutional, HEENT, cardiovascular, pulmonary, GI, , musculoskeletal, neuro, skin, endocrine and psych systems are negative, except as otherwise noted.    Objective    /88 (BP Location: Right arm, Patient Position: Sitting, Cuff Size: Adult Large)   Pulse 58   Temp 97  F (36.1  C) (Temporal)   Resp 16   Ht 1.78 m (5' 10.08\")   Wt 107 kg (236 lb)   SpO2 96%   BMI 33.79 kg/m     Estimated body mass index is 33.79 kg/m  as calculated from the following:    Height as of this encounter: 1.78 m (5' 10.08\").    Weight as of this encounter: 107 kg (236 lb).  Physical Exam  GENERAL: alert " and no distress  EYES: Eyes grossly normal to inspection, PERRL and conjunctivae and sclerae normal  NECK: no adenopathy, no asymmetry, masses, or scars  RESP: lungs clear to auscultation - no rales, rhonchi or wheezes  CV: regular rate and rhythm, normal S1 S2, no S3 or S4, no murmur, click or rub, no peripheral edema  MS: no gross musculoskeletal defects noted, no edema  SKIN: no suspicious lesions or rashes  NEURO: Normal strength and tone, mentation intact and speech normal  PSYCH: mentation appears normal, affect normal/bright    Recent Labs   Lab Test 01/04/24  0902 12/14/23  1006   HGB 14.0 13.3   * 166    137   POTASSIUM 4.6 4.6   CR 0.85 0.88   A1C  --  5.5      Diagnostics  Recent Results (from the past 24 hour(s))   EKG 12-lead, tracing only (Same Day)    Collection Time: 09/13/24  3:04 PM   Result Value Ref Range    Systolic Blood Pressure  mmHg    Diastolic Blood Pressure  mmHg    Ventricular Rate 66 BPM    Atrial Rate 66 BPM    OH Interval 238 ms    QRS Duration 110 ms     ms    QTc 440 ms    P Axis 44 degrees    R AXIS -1 degrees    T Axis 49 degrees    Interpretation ECG       Sinus rhythm with 1st degree A-V block with occasional Premature ventricular complexes  Possible Left atrial enlargement  Nonspecific T wave abnormality  Abnormal ECG  When compared with ECG of 11-Oct-2023 14:59,  Premature ventricular complexes are now Present  Questionable change in QRS axis  T wave inversion no longer evident in Inferior leads  Nonspecific T wave abnormality no longer evident in Anterior leads     Basic Metabolic Panel    Collection Time: 09/13/24  3:36 PM   Result Value Ref Range    Sodium 141 135 - 145 mmol/L    Potassium 4.1 3.4 - 5.3 mmol/L    Chloride 105 98 - 107 mmol/L    Carbon Dioxide (CO2) 27 22 - 29 mmol/L    Anion Gap 9 7 - 15 mmol/L    Urea Nitrogen 22.8 8.0 - 23.0 mg/dL    Creatinine 1.05 0.67 - 1.17 mg/dL    GFR Estimate 77 >60 mL/min/1.73m2    Calcium 9.3 8.8 - 10.4 mg/dL     Glucose 108 (H) 70 - 99 mg/dL   Hemoglobin A1c    Collection Time: 09/13/24  3:36 PM   Result Value Ref Range    Hemoglobin A1C 5.7 4.0 - 6.2 %   CBC with platelets and differential    Collection Time: 09/13/24  3:36 PM   Result Value Ref Range    WBC Count 4.6 4.0 - 11.0 10e3/uL    RBC Count 4.44 4.40 - 5.90 10e6/uL    Hemoglobin 14.8 13.3 - 17.7 g/dL    Hematocrit 43.2 40.0 - 53.0 %    MCV 97 78 - 100 fL    MCH 33.3 (H) 26.5 - 33.0 pg    MCHC 34.3 31.5 - 36.5 g/dL    RDW 13.4 10.0 - 15.0 %    Platelet Count 78 (L) 150 - 450 10e3/uL    % Neutrophils 39 %    % Lymphocytes 55 %    % Monocytes 4 %    % Eosinophils 1 %    % Basophils 0 %    % Immature Granulocytes 0 %    NRBCs per 100 WBC 0 <1 /100    Absolute Neutrophils 1.8 1.6 - 8.3 10e3/uL    Absolute Lymphocytes 2.6 0.8 - 5.3 10e3/uL    Absolute Monocytes 0.2 0.0 - 1.3 10e3/uL    Absolute Eosinophils 0.1 0.0 - 0.7 10e3/uL    Absolute Basophils 0.0 0.0 - 0.2 10e3/uL    Absolute Immature Granulocytes 0.0 <=0.4 10e3/uL    Absolute NRBCs 0.0 10e3/uL      EKG: Sinus rhythm with 1st degree A-V block with occasional Premature ventricular complexes, possible left atrial enlargement, non specific T wave abnormality - unchanged from previous EKG/tracing on 10/11/23    Revised Cardiac Risk Index (RCRI)  The patient has the following serious cardiovascular risks for perioperative complications:   - Congestive Heart Failure (pulmonary edema, PND, s3 faraz, CXR with pulmonary congestion, basilar rales) = 1 point   RCRI Interpretation: 1 point: Class II (low risk - 0.9% complication rate)    Signed Electronically by: Erica Jeronimo PA-C  A copy of this evaluation report is provided to the requesting physician.

## 2024-09-16 LAB
ATRIAL RATE - MUSE: 66 BPM
DIASTOLIC BLOOD PRESSURE - MUSE: NORMAL MMHG
INTERPRETATION ECG - MUSE: NORMAL
P AXIS - MUSE: 44 DEGREES
PR INTERVAL - MUSE: 238 MS
QRS DURATION - MUSE: 110 MS
QT - MUSE: 420 MS
QTC - MUSE: 440 MS
R AXIS - MUSE: -1 DEGREES
SYSTOLIC BLOOD PRESSURE - MUSE: NORMAL MMHG
T AXIS - MUSE: 49 DEGREES
VENTRICULAR RATE- MUSE: 66 BPM

## 2024-09-20 ENCOUNTER — ANESTHESIA EVENT (OUTPATIENT)
Dept: SURGERY | Facility: OTHER | Age: 69
End: 2024-09-20
Payer: MEDICARE

## 2024-09-23 ENCOUNTER — HOSPITAL ENCOUNTER (OUTPATIENT)
Facility: OTHER | Age: 69
Discharge: HOME | End: 2024-09-23
Attending: ORTHOPAEDIC SURGERY | Admitting: ORTHOPAEDIC SURGERY
Payer: MEDICARE

## 2024-09-23 ENCOUNTER — ANESTHESIA (OUTPATIENT)
Dept: SURGERY | Facility: OTHER | Age: 69
End: 2024-09-23
Payer: MEDICARE

## 2024-09-23 VITALS
DIASTOLIC BLOOD PRESSURE: 83 MMHG | TEMPERATURE: 96.8 F | BODY MASS INDEX: 33.79 KG/M2 | RESPIRATION RATE: 14 BRPM | SYSTOLIC BLOOD PRESSURE: 129 MMHG | HEIGHT: 70 IN | WEIGHT: 236 LBS | HEART RATE: 56 BPM | OXYGEN SATURATION: 94 %

## 2024-09-23 DIAGNOSIS — M75.112 NONTRAUMATIC INCOMPLETE TEAR OF LEFT ROTATOR CUFF: Primary | ICD-10-CM

## 2024-09-23 PROCEDURE — 258N000003 HC RX IP 258 OP 636

## 2024-09-23 PROCEDURE — 272N000001 HC OR GENERAL SUPPLY STERILE: Performed by: ORTHOPAEDIC SURGERY

## 2024-09-23 PROCEDURE — 258N000003 HC RX IP 258 OP 636: Performed by: NURSE ANESTHETIST, CERTIFIED REGISTERED

## 2024-09-23 PROCEDURE — C1713 ANCHOR/SCREW BN/BN,TIS/BN: HCPCS | Performed by: ORTHOPAEDIC SURGERY

## 2024-09-23 PROCEDURE — 999N000141 HC STATISTIC PRE-PROCEDURE NURSING ASSESSMENT: Performed by: ORTHOPAEDIC SURGERY

## 2024-09-23 PROCEDURE — 250N000011 HC RX IP 250 OP 636

## 2024-09-23 PROCEDURE — 29827 SHO ARTHRS SRG RT8TR CUF RPR: CPT

## 2024-09-23 PROCEDURE — 29827 SHO ARTHRS SRG RT8TR CUF RPR: CPT | Mod: LT | Performed by: ORTHOPAEDIC SURGERY

## 2024-09-23 PROCEDURE — 29824 SHO ARTHRS SRG DSTL CLAVICLC: CPT | Mod: LT | Performed by: ORTHOPAEDIC SURGERY

## 2024-09-23 PROCEDURE — 710N000012 HC RECOVERY PHASE 2, PER MINUTE: Performed by: ORTHOPAEDIC SURGERY

## 2024-09-23 PROCEDURE — 272N000002 HC OR SUPPLY OTHER OPNP: Performed by: ORTHOPAEDIC SURGERY

## 2024-09-23 PROCEDURE — 250N000009 HC RX 250

## 2024-09-23 PROCEDURE — 370N000017 HC ANESTHESIA TECHNICAL FEE, PER MIN: Performed by: ORTHOPAEDIC SURGERY

## 2024-09-23 PROCEDURE — 258N000001 HC RX 258: Performed by: ORTHOPAEDIC SURGERY

## 2024-09-23 PROCEDURE — 360N000077 HC SURGERY LEVEL 4, PER MIN: Performed by: ORTHOPAEDIC SURGERY

## 2024-09-23 PROCEDURE — 64415 NJX AA&/STRD BRCH PLXS IMG: CPT | Mod: LT

## 2024-09-23 PROCEDURE — 29826 SHO ARTHRS SRG DECOMPRESSION: CPT | Mod: LT | Performed by: ORTHOPAEDIC SURGERY

## 2024-09-23 PROCEDURE — 710N000010 HC RECOVERY PHASE 1, LEVEL 2, PER MIN: Performed by: ORTHOPAEDIC SURGERY

## 2024-09-23 PROCEDURE — 250N000011 HC RX IP 250 OP 636: Performed by: ORTHOPAEDIC SURGERY

## 2024-09-23 PROCEDURE — 250N000025 HC SEVOFLURANE, PER MIN: Performed by: ORTHOPAEDIC SURGERY

## 2024-09-23 DEVICE — FBRTK SPDBRG IMP SYS W/ BC SWVLK
Type: IMPLANTABLE DEVICE | Site: SHOULDER | Status: FUNCTIONAL
Brand: ARTHREX®

## 2024-09-23 RX ORDER — FENTANYL CITRATE 50 UG/ML
INJECTION, SOLUTION INTRAMUSCULAR; INTRAVENOUS PRN
Status: DISCONTINUED | OUTPATIENT
Start: 2024-09-23 | End: 2024-09-23

## 2024-09-23 RX ORDER — GLYCOPYRROLATE 0.2 MG/ML
INJECTION, SOLUTION INTRAMUSCULAR; INTRAVENOUS PRN
Status: DISCONTINUED | OUTPATIENT
Start: 2024-09-23 | End: 2024-09-23

## 2024-09-23 RX ORDER — SODIUM CHLORIDE, SODIUM LACTATE, POTASSIUM CHLORIDE, CALCIUM CHLORIDE 600; 310; 30; 20 MG/100ML; MG/100ML; MG/100ML; MG/100ML
INJECTION, SOLUTION INTRAVENOUS CONTINUOUS PRN
Status: DISCONTINUED | OUTPATIENT
Start: 2024-09-23 | End: 2024-09-23

## 2024-09-23 RX ORDER — HYDROCODONE BITARTRATE AND ACETAMINOPHEN 5; 325 MG/1; MG/1
1 TABLET ORAL
Status: DISCONTINUED | OUTPATIENT
Start: 2024-09-23 | End: 2024-09-23 | Stop reason: HOSPADM

## 2024-09-23 RX ORDER — ONDANSETRON 2 MG/ML
INJECTION INTRAMUSCULAR; INTRAVENOUS PRN
Status: DISCONTINUED | OUTPATIENT
Start: 2024-09-23 | End: 2024-09-23

## 2024-09-23 RX ORDER — DEXAMETHASONE SODIUM PHOSPHATE 10 MG/ML
4 INJECTION, SOLUTION INTRAMUSCULAR; INTRAVENOUS
Status: DISCONTINUED | OUTPATIENT
Start: 2024-09-23 | End: 2024-09-23 | Stop reason: HOSPADM

## 2024-09-23 RX ORDER — DEXAMETHASONE SODIUM PHOSPHATE 4 MG/ML
INJECTION, SOLUTION INTRA-ARTICULAR; INTRALESIONAL; INTRAMUSCULAR; INTRAVENOUS; SOFT TISSUE PRN
Status: DISCONTINUED | OUTPATIENT
Start: 2024-09-23 | End: 2024-09-23

## 2024-09-23 RX ORDER — CEFAZOLIN SODIUM/WATER 2 G/20 ML
2 SYRINGE (ML) INTRAVENOUS SEE ADMIN INSTRUCTIONS
Status: DISCONTINUED | OUTPATIENT
Start: 2024-09-23 | End: 2024-09-23 | Stop reason: HOSPADM

## 2024-09-23 RX ORDER — LIDOCAINE 40 MG/G
CREAM TOPICAL
Status: DISCONTINUED | OUTPATIENT
Start: 2024-09-23 | End: 2024-09-23 | Stop reason: HOSPADM

## 2024-09-23 RX ORDER — CEFAZOLIN SODIUM/WATER 2 G/20 ML
2 SYRINGE (ML) INTRAVENOUS
Status: COMPLETED | OUTPATIENT
Start: 2024-09-23 | End: 2024-09-23

## 2024-09-23 RX ORDER — EPHEDRINE SULFATE 50 MG/ML
INJECTION, SOLUTION INTRAMUSCULAR; INTRAVENOUS; SUBCUTANEOUS PRN
Status: DISCONTINUED | OUTPATIENT
Start: 2024-09-23 | End: 2024-09-23

## 2024-09-23 RX ORDER — OXYCODONE HYDROCHLORIDE 5 MG/1
5 TABLET ORAL
Status: DISCONTINUED | OUTPATIENT
Start: 2024-09-23 | End: 2024-09-23 | Stop reason: HOSPADM

## 2024-09-23 RX ORDER — ONDANSETRON 2 MG/ML
4 INJECTION INTRAMUSCULAR; INTRAVENOUS EVERY 30 MIN PRN
Status: DISCONTINUED | OUTPATIENT
Start: 2024-09-23 | End: 2024-09-23 | Stop reason: HOSPADM

## 2024-09-23 RX ORDER — SODIUM CHLORIDE, SODIUM LACTATE, POTASSIUM CHLORIDE, CALCIUM CHLORIDE 600; 310; 30; 20 MG/100ML; MG/100ML; MG/100ML; MG/100ML
INJECTION, SOLUTION INTRAVENOUS CONTINUOUS
Status: DISCONTINUED | OUTPATIENT
Start: 2024-09-23 | End: 2024-09-23 | Stop reason: HOSPADM

## 2024-09-23 RX ORDER — PROPOFOL 10 MG/ML
INJECTION, EMULSION INTRAVENOUS PRN
Status: DISCONTINUED | OUTPATIENT
Start: 2024-09-23 | End: 2024-09-23

## 2024-09-23 RX ORDER — FENTANYL CITRATE 50 UG/ML
25 INJECTION, SOLUTION INTRAMUSCULAR; INTRAVENOUS EVERY 5 MIN PRN
Status: DISCONTINUED | OUTPATIENT
Start: 2024-09-23 | End: 2024-09-23 | Stop reason: HOSPADM

## 2024-09-23 RX ORDER — FENTANYL CITRATE 50 UG/ML
50 INJECTION, SOLUTION INTRAMUSCULAR; INTRAVENOUS
Status: DISCONTINUED | OUTPATIENT
Start: 2024-09-23 | End: 2024-09-23 | Stop reason: HOSPADM

## 2024-09-23 RX ORDER — IBUPROFEN 200 MG
600 TABLET ORAL
Status: DISCONTINUED | OUTPATIENT
Start: 2024-09-23 | End: 2024-09-23 | Stop reason: HOSPADM

## 2024-09-23 RX ORDER — OXYCODONE HYDROCHLORIDE 5 MG/1
10 TABLET ORAL
Status: DISCONTINUED | OUTPATIENT
Start: 2024-09-23 | End: 2024-09-23 | Stop reason: HOSPADM

## 2024-09-23 RX ORDER — FENTANYL CITRATE 50 UG/ML
50 INJECTION, SOLUTION INTRAMUSCULAR; INTRAVENOUS EVERY 5 MIN PRN
Status: DISCONTINUED | OUTPATIENT
Start: 2024-09-23 | End: 2024-09-23 | Stop reason: HOSPADM

## 2024-09-23 RX ORDER — LIDOCAINE HYDROCHLORIDE 20 MG/ML
INJECTION, SOLUTION INFILTRATION; PERINEURAL PRN
Status: DISCONTINUED | OUTPATIENT
Start: 2024-09-23 | End: 2024-09-23

## 2024-09-23 RX ORDER — HYDROMORPHONE HCL IN WATER/PF 6 MG/30 ML
0.2 PATIENT CONTROLLED ANALGESIA SYRINGE INTRAVENOUS EVERY 5 MIN PRN
Status: DISCONTINUED | OUTPATIENT
Start: 2024-09-23 | End: 2024-09-23 | Stop reason: HOSPADM

## 2024-09-23 RX ORDER — ONDANSETRON 4 MG/1
4 TABLET, ORALLY DISINTEGRATING ORAL EVERY 30 MIN PRN
Status: DISCONTINUED | OUTPATIENT
Start: 2024-09-23 | End: 2024-09-23 | Stop reason: HOSPADM

## 2024-09-23 RX ORDER — NALOXONE HYDROCHLORIDE 0.4 MG/ML
0.1 INJECTION, SOLUTION INTRAMUSCULAR; INTRAVENOUS; SUBCUTANEOUS
Status: DISCONTINUED | OUTPATIENT
Start: 2024-09-23 | End: 2024-09-23 | Stop reason: HOSPADM

## 2024-09-23 RX ORDER — BUPIVACAINE HYDROCHLORIDE 5 MG/ML
INJECTION, SOLUTION EPIDURAL; INTRACAUDAL
Status: COMPLETED | OUTPATIENT
Start: 2024-09-23 | End: 2024-09-23

## 2024-09-23 RX ORDER — HYDROMORPHONE HCL IN WATER/PF 6 MG/30 ML
0.4 PATIENT CONTROLLED ANALGESIA SYRINGE INTRAVENOUS EVERY 5 MIN PRN
Status: DISCONTINUED | OUTPATIENT
Start: 2024-09-23 | End: 2024-09-23 | Stop reason: HOSPADM

## 2024-09-23 RX ORDER — HYDROCODONE BITARTRATE AND ACETAMINOPHEN 5; 325 MG/1; MG/1
1-2 TABLET ORAL EVERY 4 HOURS PRN
Qty: 40 TABLET | Refills: 0 | Status: SHIPPED | OUTPATIENT
Start: 2024-09-23

## 2024-09-23 RX ADMIN — PHENYLEPHRINE HYDROCHLORIDE 50 MCG: 10 INJECTION INTRAVENOUS at 11:34

## 2024-09-23 RX ADMIN — LIDOCAINE HYDROCHLORIDE 100 MG: 20 INJECTION, SOLUTION INFILTRATION; PERINEURAL at 11:14

## 2024-09-23 RX ADMIN — PROPOFOL 200 MG: 10 INJECTION, EMULSION INTRAVENOUS at 11:14

## 2024-09-23 RX ADMIN — Medication 5 MG: at 11:34

## 2024-09-23 RX ADMIN — SODIUM CHLORIDE, POTASSIUM CHLORIDE, SODIUM LACTATE AND CALCIUM CHLORIDE 100 ML/HR: 600; 310; 30; 20 INJECTION, SOLUTION INTRAVENOUS at 09:28

## 2024-09-23 RX ADMIN — PHENYLEPHRINE HYDROCHLORIDE 100 MCG: 10 INJECTION INTRAVENOUS at 12:12

## 2024-09-23 RX ADMIN — PHENYLEPHRINE HYDROCHLORIDE 100 MCG: 10 INJECTION INTRAVENOUS at 11:52

## 2024-09-23 RX ADMIN — PROPOFOL 50 MG: 10 INJECTION, EMULSION INTRAVENOUS at 11:19

## 2024-09-23 RX ADMIN — PHENYLEPHRINE HYDROCHLORIDE 50 MCG: 10 INJECTION INTRAVENOUS at 11:33

## 2024-09-23 RX ADMIN — Medication 2 G: at 10:58

## 2024-09-23 RX ADMIN — PHENYLEPHRINE HYDROCHLORIDE 100 MCG: 10 INJECTION INTRAVENOUS at 12:06

## 2024-09-23 RX ADMIN — PROPOFOL 30 MG: 10 INJECTION, EMULSION INTRAVENOUS at 12:13

## 2024-09-23 RX ADMIN — SODIUM CHLORIDE, SODIUM LACTATE, POTASSIUM CHLORIDE, AND CALCIUM CHLORIDE: 600; 310; 30; 20 INJECTION, SOLUTION INTRAVENOUS at 11:14

## 2024-09-23 RX ADMIN — MIDAZOLAM 4 MG: 1 INJECTION INTRAMUSCULAR; INTRAVENOUS at 09:46

## 2024-09-23 RX ADMIN — PHENYLEPHRINE HYDROCHLORIDE 100 MCG: 10 INJECTION INTRAVENOUS at 11:58

## 2024-09-23 RX ADMIN — FENTANYL CITRATE 25 MCG: 50 INJECTION INTRAMUSCULAR; INTRAVENOUS at 12:15

## 2024-09-23 RX ADMIN — ONDANSETRON HYDROCHLORIDE 4 MG: 2 SOLUTION INTRAMUSCULAR; INTRAVENOUS at 11:13

## 2024-09-23 RX ADMIN — PHENYLEPHRINE HYDROCHLORIDE 50 MCG: 10 INJECTION INTRAVENOUS at 11:44

## 2024-09-23 RX ADMIN — GLYCOPYRROLATE 0.2 MG: 0.2 INJECTION, SOLUTION INTRAMUSCULAR; INTRAVENOUS at 11:29

## 2024-09-23 RX ADMIN — DEXAMETHASONE SODIUM PHOSPHATE 4 MG: 4 INJECTION, SOLUTION INTRA-ARTICULAR; INTRALESIONAL; INTRAMUSCULAR; INTRAVENOUS; SOFT TISSUE at 11:13

## 2024-09-23 RX ADMIN — BUPIVACAINE HYDROCHLORIDE 20 ML: 5 INJECTION, SOLUTION EPIDURAL; INTRACAUDAL; PERINEURAL at 09:57

## 2024-09-23 ASSESSMENT — ACTIVITIES OF DAILY LIVING (ADL)
ADLS_ACUITY_SCORE: 35

## 2024-09-23 ASSESSMENT — LIFESTYLE VARIABLES: TOBACCO_USE: 1

## 2024-09-23 NOTE — ANESTHESIA PROCEDURE NOTES
Brachial plexus Procedure Note    Pre-Procedure   Staff -        CRNA: Laura August APRN CRNA       Performed By: CRNA       Location: OR       Procedure Start/Stop Times: 9/23/2024 9:46 AM and 9/23/2024 9:57 AM       Pre-Anesthestic Checklist: patient identified, IV checked, site marked, risks and benefits discussed, informed consent, monitors and equipment checked, pre-op evaluation, at physician/surgeon's request and post-op pain management  Timeout:       Correct Patient: Yes        Correct Procedure: Yes        Correct Site: Yes        Correct Position: Yes        Correct Laterality: Yes        Site Marked: Yes  Procedure Documentation  Procedure: Brachial plexus       Diagnosis: LEFT SHOULDER PAIN       Laterality: left       Patient Position: supine       Skin prep: Chloraprep       Local skin infiltrated with 1 mL of 1% lidocaine.  (interscalene approach).       Needle Type: insulated and short bevel       Needle Gauge: 20.        Needle Length (Inches): 3.13        Ultrasound guided       1. Ultrasound was used to identify targeted nerve, plexus, vascular marker, or fascial plane and place a needle adjacent to it in real-time.       2. Ultrasound was used to visualize the spread of anesthetic in close proximity to the above referenced structure.       3. A permanent image is entered into the patient's record.       4. The visualized anatomic structures appeared normal.       5. There were no apparent abnormal pathologic findings.    Assessment/Narrative         The placement was negative for: site bleeding       Paresthesias: No.       Bolus given via needle..        Secured via.        Insertion/Infusion Method: Single Shot       Complications: none    Medication(s) Administered   Bupivacaine 0.5% PF (Infiltration) - Infiltration   20 mL - 9/23/2024 9:57:00 AM  Medication Administration Time: 9/23/2024 9:46 AM      FOR [facility] ([facility] [place]) ONLY:   Pain Team Contact information: please page the Pain Team  "Via [employer]. Search \"Pain\". During daytime hours, please page the attending first. At night please page the resident first.      "

## 2024-09-23 NOTE — OP NOTE
Preop Dx: Left shoulder AC joint arthritis, high-grade partial-thickness rotator cuff tear    Postop Dx: Left shoulder AC joint arthritis, high-grade partial-thickness rotator cuff tear    Procedure: Left shoulder arthroscopy with subacromial decompression, distal clavicle excision and a small rotator cuff repair    Surgeon: Balbir French MD    Assistant: MARICEL Rocha/RN    Anesthesia: General With postop pain control per anesthesia nerve block    Indications: Patient is a 69-year-old gentleman with significant high-grade partial-thickness rotator cuff tear.  He was supposed to have this repaired last fall but ended up having a significant cardiac event and needed bypass surgery and lieu of having his shoulder fixed.  All the risks and benefits surgical invention were discussed with him and he wished to proceed    Description: The patient was taken to the operating room and after adequate induction of anesthesia was positioned, prepped and draped in usual sterile fashion the operative table.  A universal protocol timeout was initiated and when all in the room were in agreement an incision was made in the posterior of the shoulder for the posterior portal.  The scope trocar and cannula were advanced into the glenohumeral joint the trocar was swapped for the camera.  The glenoid cartilage was intact.  The humeral cartilage was intact with a little bit of grade 1-2 wear at the superior medial edge.  The biceps appeared benign.  An anterior portal was created medialization.  The superior glenoid labrum was probed and found to be intact.  The biceps was pulled into the joint and found to be intact as well.  The camera is then removed from the glenohumeral joint and placed in the subacromial space.  Once in the subacromial space an anterolateral portal was created via the localization and a complete bursectomy was performed of the subacromial space.  The wisp of tissue that was holding the rotator cuff in position  at the rotator cuff footprint was released and the rotator cuff tear became quite evident.  It was noted to be high-grade partially torn from inside the joint as well.  At this point attention was turned to the undersurface of the acromion with the coracoacromial ligament was released off the undersurface the acromion this exposed a large broad-based enthesophyte of the anterior acromion which was subsequently burred down until flat with a 4 mm barrel bur.  This was an excessive amount of bone that had to be removed.  A posterior lateral portal was then created via needle localization and the camera is moved to this position for viewing of the rotator cuff repair.  The rotator cuff footprint was repaired utilizing a combination of the radiofrequency ablator as well as the 4 mm barrel bur.  2 anchors were placed within the rotator cuff footprint at the edge of the articular cartilage.  These were brought up to the rotator cuff at appropriate intervals and subsequently tacked over the outside of the greater tuberosity reducing the rotator cuff down to the footprint in anatomic fashion.  The camera is then removed and the posterior lateral portal and placed in the anterolateral portal for viewing the AC joint which was denuded of soft tissue both inferiorly and anteriorly utilizing radiofrequency ablator.  The 4 mm barrel bur was used to bur down the distal clavicle approximately 5 mm.  Adequate sections document with a camera.  This concluded the arthroscopy.  4 nylon was used in a buried figure-of-eight fashion close skin of all 4 portals and a sterile dressing was applied.  He was taken in stable condition postanesthesia care unit.  He will be in a small rotator cuff repair protocol and physical therapy.

## 2024-09-23 NOTE — DISCHARGE INSTRUCTIONS
Surgeon Contact Information  Orthopedic Physical Therapy/Occupational Therapy Order faxed to patients preferred therapist *** and copy sent home with patient for reference.  If you have questions or concerns related to your procedure please contact your surgeon through [facility] at [phone].     [facility]  Adult Discharge Orders & Instructions      For 24 hours after surgery:  Get plenty of rest.  A responsible adult must stay with you for at least 24 hours after you leave the hospital.   You may feel lightheaded.  IF so, sit for a few minutes before standing.  Have someone help you get up.   You may have a slight fever. Call the doctor if your fever is over 101 F (38.3 C) (taken under the tongue) or lasts longer than 24 hours.  You may have a dry mouth, a sore throat, muscle aches or trouble sleeping.  These should go away after 24 hours.  Do not make important or legal decisions.  6.   Do not drive or use heavy equipment.  If you have weakness or tingling, don't drive or use heavy equipment until this feeling goes away.                                                                                                                                                                         To contact a doctor, call    [phone]______________

## 2024-09-23 NOTE — OR NURSING
.PACU Transfer Note    Kirk John was transferred to 736 via cart.  Equipment used for transport:  none.  Accompanied by:  Kristi BUTLER  Prescriptions were: sent to [facility]    PACU Respiratory Event Documentation     1) Episodes of Apnea greater than or equal to 10 seconds: no    2) Bradypnea - less than 8 breaths per minute: no    3) Pain score on 0 to 10 scale: 0/10    4) Pain-sedation mismatch (yes or no): no    5) Repeated 02 desaturation less than 90% (yes or no): no    Anesthesia notified? (yes or no): no    Any of the above events occuring repeatedly in separate 30 minute intervals may be considered recurrent PACU respiratory events.    Patient stable and meets phase 1 discharge criteria for transport from PACU.     Report given to Angelina BUTLER @ 2622 .

## 2024-09-23 NOTE — BRIEF OP NOTE
[facility]    Brief Operative Note    Pre-operative diagnosis: Left shoulder high-grade partial-thickness rotator cuff tear, AC joint arthritis  Post-operative diagnosis Same as pre-operative diagnosis    Procedure: Shoulder Arthroscopy, Subacromial Decompression, DISTAL CLAVICLE EXCISION, Rotator Curff Repair, Left - Shoulder    Surgeon: Surgeons and Role:     * Balbir French MD - Primary     * Keith Damon PA - Assisting  Anesthesia: General with Block   Estimated Blood Loss: Minimal    Drains: None  Specimens: * No specimens in log *  Findings:   None.  Complications: None.  Implants:   Implant Name Type Inv. Item Serial No.  Lot No. LRB No. Used Action   SPEED[employer]G IMP SYS FIBERTAK W/BIO-COMP [id number] [id number] - [id number] Metallic Hardware/[place] SPEEDBRG IMP SYS FIBERTAK W/BIO-COMP SWVLK [id number]  ARTHREX [id number] Left 1 Implanted

## 2024-09-23 NOTE — OR NURSING
Kirk has been discharged to home at 1445 via car accompanied by wife    Written discharge instructions were provided to patient and wife.  Prescriptions were picked up.  Patient states their pain is denies, and denies any nausea or dizziness upon discharge.    Patient and adult caring for them verbalize understanding of discharge instructions including no driving until tomorrow and no longer taking narcotic pain medications - no operating mechanical equipment and no making any important decisions.They understand reason for discharge, and necessary follow-up appointments.

## 2024-09-23 NOTE — ANESTHESIA PROCEDURE NOTES
Airway       Patient location during procedure: OR  Staff -        CRNA: Laura August APRN CRNA       Performed By: CRNA  Consent for Airway        Urgency: elective  Indications and Patient Condition       Indications for airway management: ignacia-procedural        Final Airway Details       Final airway type: supraglottic airway    Supraglottic Airway Details        Type: LMA       Brand: I-Gel       LMA size: 5    Post intubation assessment        Placement verified by: capnometry, equal breath sounds and chest rise        Number of attempts at approach: 2       Secured with: tape       Ease of procedure: easy       Dentition: Intact    Additional Comments       LMA size 4 placed with successful ventilation achieved but leaking, LMA 4 removed and replaced with LMA 5, LMA 5 placed with ease and success, no leak detected

## 2024-09-23 NOTE — ANESTHESIA PREPROCEDURE EVALUATION
Anesthesia Pre-Procedure Evaluation    Patient: Kirk John   MRN: [id number] : 1955        Procedure : Procedure(s):  Shoulder Arthroscopy, Subacromial Decompression, Rotator Curff Repair or Possible Biceps Tenodesis          Past Medical History:   Diagnosis Date    Abnormal result of other cardiovascular function study (CODE)     No Comments Provided    Atherosclerotic heart disease of native coronary artery without angina pectoris     No Comments Provided    Chest pain     No Comments Provided    Essential (primary) hypertension     No Comments Provided    Hyperlipidemia     No Comments Provided    Morbid obesity (H)     Osteoarthritis     No Comments Provided      Past Surgical History:   Procedure Laterality Date    ARTHROPLASTY HIP      2007,Left total hip with Dr Graham Escalante    COLONOSCOPY      6/3/05,Colonoscopy, normal.  F/u in 10 years    COLONOSCOPY  2016    Normal exam,  F/U     CV CARDIAC CATHERIZATION  2012    proximal circumflex, [facility]    RELEASE CARPAL TUNNEL      ,Right carpal tunnel sugery    TONSILLECTOMY, ADENOIDECTOMY, COMBINED      T&A as a child      Allergies   Allergen Reactions    Poison Ivy Extract Other (See Comments)     Hospitalized as a child, whole body reaction including the throat.      Social History     Tobacco Use    Smoking status: Former     Current packs/day: 0.00     Average packs/day: 1 pack/day for 9.8 years (9.8 ttl pk-yrs)     Types: Cigarettes     Start date:      Quit date: 10/11/1984     Years since quittin.9     Passive exposure: Never    Smokeless tobacco: Never    Tobacco comments:     Last smoked over 40 years ago-early s   Substance Use Topics    Alcohol use: Yes     Comment: 2 a week      Wt Readings from Last 1 Encounters:   24 107 kg (236 lb)        Anesthesia Evaluation   Pt has had prior anesthetic. Type: General.    History of anesthetic complications       ROS/MED HX  ENT/Pulmonary:     (+) sleep apnea,  doesn't use CPAP,   ANGEL risk factors,           tobacco use, Past use,                       Neurologic: Comment: Right Carotid artery stent placed in 2023    (+)              TIA, date: May 2023,                 Cardiovascular:     (+) Dyslipidemia hypertension- -  CAD -  CABG-date: 2023. -   Taking blood thinners  Instructions Given to patient: 81mg Aspirin, stopped 7 days ago.                            Previous cardiac testing   Echo: Date: 10/2023 Results:  [id number]  [id number]  [id number]  [id number]^MICHELLE^ARMEN     [facility]  [address] [place] [address]     Name: STEPHANIE CAPUTO  MRN: [id number]  : 1955  Study Date: 10/23/2023 08:32 AM  Age: 68 yrs  Gender: Male  Patient Location: [facility]  Reason For Study: Coronary artery disease involving autologous artery coronary  byp  Ordering Physician: ARMEN MARTINEZ  Referring Physician: ARMEN MARTINEZ  Performed By: MAGDALENE Chavez, RDCS, RVT     BSA: 2.3 m2  Height: 71 in  Weight: 236 lb  HR: 79  BP: 133/72 mmHg  ______________________________________________________________________________  Procedure  Echocardiogram with two-dimensional, color and spectral Doppler performed.  ______________________________________________________________________________  Interpretation Summary  Global and regional left ventricular function is hyperkinetic with an EF >70%.  Right ventricular function, chamber size, wall motion, and thickness are  normal.  Both atria appear normal.  Pulmonary artery systolic pressure is normal.  The inferior vena cava is normal.  No pericardial effusion is present.  There is no prior study for direct comparison.  ______________________________________________________________________________  Left Ventricle  Global and regional left ventricular function is hyperkinetic with an EF >70%.  Left ventricular size is normal. Mild concentric wall thickening consistent  with left ventricular  hypertrophy is present. Left ventricular diastolic  function is normal. No regional wall motion abnormalities are seen.     Right Ventricle  Right ventricular function, chamber size, wall motion, and thickness are  normal.     Atria  Both atria appear normal. The atrial septum is intact as assessed by color  Doppler .     Mitral Valve  The mitral valve is normal. Trace mitral insufficiency is present.     Aortic Valve  On Doppler interrogation, there is no significant stenosis or regurgitation.  The aortic valve is tricuspid.     Tricuspid Valve  The tricuspid valve is normal. Trace tricuspid insufficiency is present. The  right ventricular systolic pressure is approximated at 24.4 mmHg plus the  right atrial pressure. Pulmonary artery systolic pressure is normal.     Pulmonic Valve  The pulmonic valve is normal. Trace pulmonic insufficiency is present.     Vessels  The thoracic aorta is normal. The pulmonary artery is normal. The inferior  vena cava is normal.     Pericardium  No pericardial effusion is present.     Compared to Previous Study  There is no prior study for direct comparison.     ______________________________________________________________________________  MMode/2D Measurements & Calculations  IVSd: 1.2 cm  LVIDd: 4.5 cm  LVIDs: 3.2 cm  LVPWd: 1.3 cm  FS: 29.1 %  LV mass(C)d: 209.7 grams  LV mass(C)dI: 92.7 grams/m2     Ao root diam: 3.4 cm  asc Aorta Diam: 3.3 cm  LVOT diam: 2.1 cm  LVOT area: 3.5 cm2  Ao root diam index Ht(cm/m): 1.9  Ao root diam index BSA (cm/m2): 1.5  Asc Ao diam index BSA (cm/m2): 1.4  Asc Ao diam index Ht(cm/m): 1.8  LA Volume (BP): 61.5 ml  LA Volume Index (BP): 27.2 ml/m2  RWT: 0.56     TAPSE: 1.2 cm     Doppler Measurements & Calculations  MV E max finn: 56.6 cm/sec  MV A max finn: 54.8 cm/sec  MV E/A: 1.0  MV dec time: 0.22 sec  Ao V2 max: 144.0 cm/sec  Ao max P.0 mmHg  Ao V2 mean: 93.7 cm/sec  Ao mean P.0 mmHg  Ao V2 VTI: 25.7 cm  DONNA(I,D): 2.8 cm2  DONNA(V,D): 2.5  cm2  LV V1 max P.2 mmHg  LV V1 max: 103.0 cm/sec  LV V1 VTI: 20.2 cm  SV(LVOT): 71.3 ml  SI(LVOT): 31.5 ml/m2  PA acc time: 0.10 sec  TR max shawn: 247.0 cm/sec  TR max P.4 mmHg  AV Shawn Ratio (DI): 0.72  DONNA Index (cm2/m2): 1.2  E/E' av.4  Lateral E/e': 4.8  Medial E/e': 14.1  RV S Shawn: 9.6 cm/sec     ______________________________________________________________________________  Report approved by: Concetta Romero 10/23/2023 08:58 AM    Stress Test:  Date: Results:    ECG Reviewed:  Date: 2024 Results:    Sinus rhythm with 1st degree A-V block with occasional Premature ventricular complexes  Possible Left atrial enlargement  Nonspecific T wave abnormality  Abnormal ECG  When compared with ECG of 11-Oct-2023 14:59,  Premature ventricular complexes are now Present  Questionable change in QRS axis  T wave inversion no longer evident in Inferior leads  Nonspecific T wave abnormality no longer evident in Anterior leads  Confirmed by MD HUI BRENT ([id number]) on 2024 5:18:53 PM      Cath:  Date: Results:      METS/Exercise Tolerance:  Comment: CABG x3 in 2023  Thrombocytopenia plt 78   Hematologic:       Musculoskeletal:   (+)  arthritis,             GI/Hepatic:       Renal/Genitourinary: Comment: CKD stage II    (+) renal disease,             Endo:     (+)               Obesity,       Psychiatric/Substance Use:       Infectious Disease:       Malignancy:       Other:            Physical Exam    Airway        Mallampati: III   TM distance: > 3 FB   Neck ROM: full   Mouth opening: > 3 cm    Respiratory Devices and Support         Dental     Comment: Multiple filling and crowns, nothing loose        Cardiovascular   cardiovascular exam normal          Pulmonary   pulmonary exam normal                OUTSIDE LABS:  CBC:   Lab Results   Component Value Date    WBC 4.6 2024    WBC 4.7 2024    HGB 14.8 2024    HGB 14.0 2024    HCT 43.2 2024    HCT 43.7 2024    PLT 78  "(L) 09/13/2024     (L) 01/04/2024     BMP:   Lab Results   Component Value Date     09/13/2024     01/04/2024    POTASSIUM 4.1 09/13/2024    POTASSIUM 4.6 01/04/2024    CHLORIDE 105 09/13/2024    CHLORIDE 103 01/04/2024    CO2 27 09/13/2024    CO2 28 01/04/2024    BUN 22.8 09/13/2024    BUN 23.2 (H) 01/04/2024    CR 1.05 09/13/2024    CR 0.85 01/04/2024     (H) 09/13/2024     (H) 01/04/2024     COAGS:   Lab Results   Component Value Date    PTT 25 (L) 06/29/2016    INR 1.1 06/29/2016     POC: No results found for: \"BGM\", \"HCG\", \"HCGS\"  HEPATIC:   Lab Results   Component Value Date    ALBUMIN 4.6 01/04/2024    PROTTOTAL 7.5 01/04/2024    ALT 28 01/04/2024    AST 30 01/04/2024    ALKPHOS 80 01/04/2024    BILITOTAL 0.4 01/04/2024     OTHER:   Lab Results   Component Value Date    A1C 5.7 09/13/2024    KEO 9.3 09/13/2024    MAG 1.9 06/29/2016    TSH 3.89 12/06/2021    SED 10 03/30/2023       Anesthesia Plan    ASA Status:  4    NPO Status:  NPO Appropriate    Anesthesia Type: General (discussed ETT and patient in agreement if necessary).     - Airway: LMA              Consents    Anesthesia Plan(s) and associated risks, benefits, and realistic alternatives discussed. Questions answered and patient/representative(s) expressed understanding.     - Discussed: Risks, Benefits and Alternatives for BOTH SEDATION and the PROCEDURE were discussed     - Discussed with:  Patient      - Extended Intubation/Ventilatory Support Discussed: No.      - Patient is DNR/DNI Status: No     Use of blood products discussed: No .     Postoperative Care    Pain management: Peripheral nerve block (Single Shot).        Comments:               TESSIE Davis CRNA    I have reviewed the pertinent notes and labs in the chart from the past 30 days and (re)examined the patient.  Any updates or changes from those notes are reflected in this note.             # Drug Induced Platelet Defect: home medication list " "includes an antiplatelet medication  # Obesity: Estimated body mass index is 33.86 kg/m  as calculated from the following:    Height as of this encounter: 1.778 m (5' 10\").    Weight as of this encounter: 107 kg (236 lb).      "

## 2024-09-23 NOTE — ANESTHESIA POSTPROCEDURE EVALUATION
Patient: Kirk John    Procedure: Procedure(s):  Shoulder Arthroscopy, Subacromial Decompression, DISTAL CLAVICLE EXCISION, Rotator Curff Repair       Anesthesia Type:  General    Note:  Disposition: Outpatient   Postop Pain Control: Uneventful            Sign Out: Well controlled pain   PONV: No   Neuro/Psych: Uneventful            Sign Out: Acceptable/Baseline neuro status   Airway/Respiratory: Uneventful            Sign Out: Acceptable/Baseline resp. status   CV/Hemodynamics: Uneventful            Sign Out: Acceptable CV status; No obvious hypovolemia; No obvious fluid overload   Other NRE: NONE   DID A NON-ROUTINE EVENT OCCUR?            Last vitals:  Vitals Value Taken Time   /84 09/23/24 1305   Temp 98.1  F (36.7  C) 09/23/24 1305   Pulse 56 09/23/24 1306   Resp 15 09/23/24 1306   SpO2 94 % 09/23/24 1305   Vitals shown include unfiled device data.    Electronically Signed By: TESSIE BEVERLY CRNA  September 23, 2024  1:50 PM

## 2024-09-23 NOTE — ANESTHESIA CARE TRANSFER NOTE
Patient: Kirk John    Procedure: Procedure(s):  Shoulder Arthroscopy, Subacromial Decompression, DISTAL CLAVICLE EXCISION, Rotator Curff Repair       Diagnosis: Impingement of shoulder [M25.819]  Diagnosis Additional Information: No value filed.    Anesthesia Type:   General     Note:    Oropharynx: oropharynx clear of all foreign objects  Level of Consciousness: awake  Oxygen Supplementation: face mask  Level of Supplemental Oxygen (L/min / FiO2): 8  Independent Airway: airway patency satisfactory and stable  Dentition: dentition unchanged  Vital Signs Stable: post-procedure vital signs reviewed and stable  Report to RN Given: handoff report given  Patient transferred to: PACU    Handoff Report: Identifed the Patient, Identified the Reponsible Provider, Reviewed the pertinent medical history, Discussed the surgical course, Reviewed Intra-OP anesthesia mangement and issues during anesthesia, Set expectations for post-procedure period and Allowed opportunity for questions and acknowledgement of understanding  Vitals:  Vitals Value Taken Time   BP     Temp     Pulse     Resp     SpO2         Electronically Signed By: TESSIE Kasper CRNA  September 23, 2024  12:43 PM

## 2024-09-30 ENCOUNTER — OFFICE VISIT (OUTPATIENT)
Dept: ORTHOPEDICS | Facility: OTHER | Age: 69
End: 2024-09-30
Attending: ORTHOPAEDIC SURGERY
Payer: MEDICARE

## 2024-09-30 ENCOUNTER — THERAPY VISIT (OUTPATIENT)
Dept: PHYSICAL THERAPY | Facility: OTHER | Age: 69
End: 2024-09-30
Attending: ORTHOPAEDIC SURGERY
Payer: MEDICARE

## 2024-09-30 DIAGNOSIS — M75.102 TEAR OF LEFT SUPRASPINATUS TENDON: ICD-10-CM

## 2024-09-30 DIAGNOSIS — Z98.890 S/P ARTHROSCOPY OF LEFT SHOULDER: Primary | ICD-10-CM

## 2024-09-30 PROCEDURE — G0463 HOSPITAL OUTPT CLINIC VISIT: HCPCS

## 2024-09-30 PROCEDURE — 97016 VASOPNEUMATIC DEVICE THERAPY: CPT | Mod: GP,PO

## 2024-09-30 PROCEDURE — 99024 POSTOP FOLLOW-UP VISIT: CPT | Performed by: ORTHOPAEDIC SURGERY

## 2024-09-30 PROCEDURE — 99495 TRANSJ CARE MGMT MOD F2F 14D: CPT | Performed by: ORTHOPAEDIC SURGERY

## 2024-09-30 PROCEDURE — 97161 PT EVAL LOW COMPLEX 20 MIN: CPT | Mod: GP,PO

## 2024-09-30 PROCEDURE — 97110 THERAPEUTIC EXERCISES: CPT | Mod: GP,PO

## 2024-09-30 NOTE — PROGRESS NOTES
Subjective:    69-year-old gentleman status post left shoulder arthroscopy with subacromial decompression, distal clavicle excision and a small rotator cuff repair now 1 week out.  He is doing fairly well at this point.  No real complaints with his shoulder so far.  He started physical therapy today    Objective:    On examination today.  His wounds are healing nicely sutures removed and Steri-Strips were applied.  He is neurovascularly intact not put through range of motion given the proximity to surgery and his extensive ecchymosis about the left shoulder.    Assessment:    69-year-old gentleman status post left shoulder arthroscopy with subacromial decompression, distal clavicle excision and small rotator cuff repair    Plan:    Is doing really well at this point.  Start physical therapy today.  Will see him back in approximately 4 weeks.

## 2024-09-30 NOTE — PROGRESS NOTES
PHYSICAL THERAPY EVALUATION  Type of Visit: Evaluation       Fall Risk Screen:  Fall screen completed by: PT  Have you fallen 2 or more times in the past year?: No  Have you fallen and had an injury in the past year?: No  Is patient a fall risk?: No    Subjective       Presenting condition or subjective complaint: Rotatator cuff surgery to left shoulder done on September 23, 2024.  Patient is status post Left shoulder arthroscopy with subacromial decompression, distal clavicle excision and a small rotator cuff repair performed by Dr. French on 3/23/2024.  Patient reports continued improvement following surgery.  Patient states he had a follow-up with Dr. French today.  Sutures were removed and Steri-Strips placed.  Patient reports compliance with use of sling at this time.     Date of onset: 09/23/24    Relevant medical history: Arthritis; Heart problems; High blood pressure; Overweight; Progressive neurological deficits; Rheumatoid arthritis; Sleep disorder like apnea; Stroke   Dates & types of surgery: 1960: tonsillectomy; early 2000s: carpal tunnel; 5/30/07: left hip replacement; 8/31/12: cardiac stent; 5/1/23: carotid artery stent; 9/22/23: CABG X3; 9/23/24: left shoulder rotator cuff.    Prior diagnostic imaging/testing results: MRI     Prior therapy history for the same diagnosis, illness or injury: Yes Physical therapy Jan-Feb 2024 with Graham Goode.    Prior Level of Function  No prior function limitations reported.    Living Environment  Social support: With a significant other or spouse   Type of home: House; 2-story; Multi-level; Basement   Stairs to enter the home: Yes 3 Is there a railing: No     Ramp: No   Stairs inside the home: Yes 6 Is there a railing: Yes     Help at home: Self Cares (home health aide/personal care attendant, family, etc); Home management tasks (cooking, cleaning); Medication and/or finances; Home and Yard maintenance tasks; Assist for driving and community activities  Equipment  owned: Straight Cane; Crutches; Raised toilet seat     Employment: No    Hobbies/Interests: Camping, fishing, deer hunting, reading    Patient goals for therapy: Full range of motion.         Objective   SHOULDER EVALUATION  PAIN: Pain Level at Rest: 0/10  Pain Level with Use: 2/10  Pain Location: Anterior/lateral left shoulder  Pain Quality: Dull  Pain Frequency: intermittent  Pain is Exacerbated By: Unable to lift, carry, reach overhead, reach behind the back, reach pain his head, perform ADLs with left upper extremity and unable to perform household tasks without outside chores with left upper extremity at this time.  Pain is Relieved By: cold, otc medications, and sling  INTEGUMENTARY (edema, incisions):  Incision sites covered with Steri-Strips.  Ecchymosis noted over the upper arm region.  POSTURE:  Mild shoulder posture in sitting but able to correct with minimal cues.    ROM: Formal range of motion measurements not completed due to current postoperative status however passive left shoulder flexion was approximately 75 degrees today.    STRENGTH: Not tested due to current postoperative status.    SPECIAL TESTS: Not indicated due to current postoperative status.      Assessment & Plan   CLINICAL IMPRESSIONS  Medical Diagnosis: s/p Left shoulder arthroscopy with subacromial decompression, distal clavicle excision and a small rotator cuff repair    Treatment Diagnosis: Left shoulder pain, impaired range of motion, weakness, postural dysfunction   Impression/Assessment: Patient is a 69 year old male with status post  Left shoulder arthroscopy with subacromial decompression, distal clavicle excision and a small rotator cuff repair complaints.  The following significant findings have been identified: Pain, Decreased ROM/flexibility, Decreased joint mobility, Decreased strength, Inflammation, Impaired muscle performance, Decreased activity tolerance, and Impaired posture. These impairments interfere with their  ability to perform self care tasks, work tasks, recreational activities, and household chores as compared to previous level of function.     Clinical Decision Making (Complexity):  Clinical Presentation: Stable/Uncomplicated  Clinical Presentation Rationale: based on medical and personal factors listed in PT evaluation  Clinical Decision Making (Complexity): Low complexity    PLAN OF CARE  Treatment Interventions:  Modalities: Vasoneumatic Device  Interventions: Manual Therapy, Neuromuscular Re-education, Therapeutic Exercise    Long Term Goals     PT Goal 1  Goal Identifier: Pain  Goal Description: Patient will report the ability to sleep through the entire night in  his bed without waking from left shoulder pain.  Rationale: to maximize safety and independence with performance of ADLs and functional tasks;to maximize safety and independence within the home;to maximize safety and independence with self cares  Target Date: 11/25/24  PT Goal 2  Goal Identifier: Range of motion  Goal Description: Patient with demonstrate left shoulder flexion range of motion to 130 degrees or greater to allow reaching to an overhead shelf without difficulty or compensation.  Rationale: to maximize safety and independence with performance of ADLs and functional tasks;to maximize safety and independence within the home;to maximize safety and independence within the community;to maximize safety and independence with self cares  Target Date: 11/25/24  PT Goal 3  Goal Identifier: Weakness  Goal Description: Patient will demonstrate left shoulder strength at a 4/5 grade or greater to allow 2 pound object to an overhead shelf without difficulty or compensation.  Rationale: to maximize safety and independence with performance of ADLs and functional tasks;to maximize safety and independence within the home;to maximize safety and independence within the community  Target Date: 12/23/24      Frequency of Treatment: 16 visits  Duration of  Treatment: 8 weeks      Education Assessment:   Learner/Method: Patient;Listening;Reading;Demonstration;Pictures/Video;No Barriers to Learning    Risks and benefits of evaluation/treatment have been explained.   Patient/Family/caregiver agrees with Plan of Care.     Evaluation Time:     PT Eval, Low Complexity Minutes (61346): 15       Signing Clinician: Graham Goode PT        [facility]                                                                                   OUTPATIENT PHYSICAL THERAPY      PLAN OF TREATMENT FOR OUTPATIENT REHABILITATION   Patient's Last Name, First Name, Kirk Maldonado YOB: 1955   Provider's Name   [facility]   Medical Record No.  [id number]     Onset Date: 09/23/24  Start of Care Date: 09/30/24     Medical Diagnosis:  s/p Left shoulder arthroscopy with subacromial decompression, distal clavicle excision and a small rotator cuff repair      PT Treatment Diagnosis:  Left shoulder pain, impaired range of motion, weakness, postural dysfunction Plan of Treatment  Frequency/Duration: 16 visits/ 8 weeks    Certification date from 09/30/24 to 11/25/24         See note for plan of treatment details and functional goals     Graham Goode, PT                         I CERTIFY THE NEED FOR THESE SERVICES FURNISHED UNDER        THIS PLAN OF TREATMENT AND WHILE UNDER MY CARE     (Physician attestation of this document indicates review and certification of the therapy plan).              Referring Provider:  Balbir French    Initial Assessment  See Epic Evaluation- Start of Care Date: 09/30/24

## 2024-10-07 ENCOUNTER — THERAPY VISIT (OUTPATIENT)
Dept: PHYSICAL THERAPY | Facility: OTHER | Age: 69
End: 2024-10-07
Attending: ORTHOPAEDIC SURGERY
Payer: MEDICARE

## 2024-10-07 DIAGNOSIS — M75.102 TEAR OF LEFT SUPRASPINATUS TENDON: Primary | ICD-10-CM

## 2024-10-07 PROCEDURE — 97016 VASOPNEUMATIC DEVICE THERAPY: CPT | Mod: GP,PO

## 2024-10-07 PROCEDURE — 97110 THERAPEUTIC EXERCISES: CPT | Mod: GP,PO

## 2024-10-09 ENCOUNTER — THERAPY VISIT (OUTPATIENT)
Dept: PHYSICAL THERAPY | Facility: OTHER | Age: 69
End: 2024-10-09
Attending: ORTHOPAEDIC SURGERY
Payer: MEDICARE

## 2024-10-09 DIAGNOSIS — M75.102 TEAR OF LEFT SUPRASPINATUS TENDON: Primary | ICD-10-CM

## 2024-10-09 PROCEDURE — 97110 THERAPEUTIC EXERCISES: CPT | Mod: GP,PO

## 2024-10-09 PROCEDURE — 97016 VASOPNEUMATIC DEVICE THERAPY: CPT | Mod: GP,PO

## 2024-10-10 NOTE — PROGRESS NOTES
Kirk John is a 69 year old male who is being evaluated via in-person clinic visit.       Visit Details     In-clinic visit for follow-up of start of mandibular advancement device.     A/P:     1.)  Severe ANGEL (pAHI 32.9) with mild sleep-associated hypoxemia (SpO2 <= 88% for 8.9 minutes) presumed on room air.     -Prior abnormal overnight oximetry at [facility] in May 2023 with KEITH ~33 and time of SpO2 below 89% of 33 minutes performed with supplemental oxygen at 1 L/min.     Overall, does appear that severity of hypoxemia has improved given that he is now status post carotid endarterectomy, coronary artery bypass graft and interim weight loss of 30-40 pounds.     I would still recommend start of treatment to reduce long-term cardiovascular risk factors.     We discussed options including CPAP, oral appliances, ongoing weight management, surgical options.     Given that his wife has done very well with an oral appliance, and plans for ongoing weight loss, our treatment plan as of today is to proceed with referral to sleep dentistry for oral appliance for obstructive sleep apnea and ongoing weight management.     Clinically, he is doing well with the oral appliance with reported improvement in daytime sleepiness.     Our plan for today:  We will proceed with a WatchPAT home sleep test with use of his oral appliance.  Overall, if AHI is less than 15 and of there is normalization of nocturnal hypoxemia we will feel happy with this treatment.  Otherwise we may need to discuss start of CPAP.    SUBJECTIVE:  Kirk John is a 69 year old male.    Pertinent PMHx of ANGEL, HLD, pre-diabetes, CAD s/p CABG x3, HTN, rheumatoid arthritis.     I did find one CRISTAL performed at [place] on 5/1/2023 on oxygen at 1 LPM.  Mean SpO2 95%, jass 71% with repetitive desats suggestive of ANGEL.    Prior Sleep Testing:  3/5/2024 - WatchPAT HST with weight 229 lbs, BMI 32.7.  pAHI 32.9.  sustained hypoxemia was not present. Mean oxygen  "saturation was 93%. Minimum was 66%. Time with saturation less than 88% was 8.9 minutes.      2/12/2024 -we reviewed his interim history, including his time at [facility] ER around the time of TIA, carotid endarterectomy, and subsequent coronary artery bypass graft.  He was not discharged on oxygen that he recalls.     Since his interim weight loss, his snoring is very intermittent, no observed apnea.     Chief concern per questionnaire: \"Concern re drop in blood oxygen level while in [place] ICU following R carotid artery angioplasty with stent. RT Pulse Oximetry, Overnight done and on file via access from [facility] in MyChart. \"     Duration of symptoms:  \"Unknown until asked in [facility] ICU on 5/1/23 if I had ever been told I had sleep apnea. \"     Goals for visit per questionnaire: \"To find out if I really have sleep apnea and/or need a C Pap machine. \"     Sleep pattern:  Workdays.  10:30pm to 4:30-5:30am with goal of 6am  Weekends.  similar.  Time to fall asleep: ~10 minutes.  Awakenings: 4-5 times per night, 15 minutes to return to sleep.  Average total sleep time:  6.25 hours  Napping. 1-2 days per week, ? hours per nap.     No for RLS screen.  No for sleep walking.  No for dream enactment behavior.  No for bruxism.     No for morning headaches.  No for snoring.  No for observed apnea.  Yes for FHx of ANGEL - 2x brothers.     Caffeine use:  Yes for 3+ per day.  No for within 6 hours of bed.     A/P for WatchPAT given suspicion for improvement in hypoxemia but still high risk for ANGEL.     4/8/2024 -reviewed his home sleep test results in detail.    A/P for treatment with combination with ongoing weight loss and oral appliance.    Today -in general, he feels that the oral appliance has been comfortable and working well.  He did not have significant snoring initially, still unsure if there is been any changes here.  But he does feel that there is been less daytime sleepiness and he no longer feels the need to take a regular " nap.  He has done further advancement of his oral appliance is up to a band size of 19.      Past medical history:    Patient Active Problem List    Diagnosis Date Noted    Tear of left supraspinatus tendon 09/30/2024     Priority: Medium    Sleep apnea, unspecified type 01/04/2024     Priority: Medium    Essential hypertension 11/04/2023     Priority: Medium    Coronary artery disease involving autologous artery coronary bypass graft without angina pectoris 11/04/2023     Priority: Medium    Disorder involving thrombocytopenia (H) 11/01/2023     Priority: Medium    Rheumatoid arthritis involving multiple sites with positive rheumatoid factor (H) 10/29/2023     Priority: Medium    S/P CABG x 3 (9/22/2023) 10/11/2023     Priority: Medium    History of coronary artery stent placement (2012) 10/11/2023     Priority: Medium    S/P patent foramen ovale closure (9/22/2023) 10/11/2023     Priority: Medium    Hyperlipidemia LDL goal <70 10/11/2023     Priority: Medium    Postoperative atrial fibrillation (H) 09/22/2023     Priority: Medium    Obstructive sleep apnea syndrome in adult 09/18/2023     Priority: Medium    Patent foramen ovale 09/18/2023     Priority: Medium    Personal history of tobacco use, presenting hazards to health 09/18/2023     Priority: Medium    Chronic left shoulder pain 09/14/2023     Priority: Medium    Traumatic incomplete tear of left rotator cuff, initial encounter 09/14/2023     Priority: Medium    Chronic anticoagulation 05/08/2023     Priority: Medium    Internal carotid artery stent present 05/08/2023     Priority: Medium    H/O amaurosis fugax 05/08/2023     Priority: Medium    Presence of other vascular implants and grafts 05/08/2023     Priority: Medium    History of right common carotid artery stent placement 05/02/2023     Priority: Medium    CKD (chronic kidney disease) stage 2, GFR 60-89 ml/min 12/14/2022     Priority: Medium    Degeneration of intervertebral disc 02/01/2018      Priority: Medium     Overview:   With back pain      Psychosexual dysfunction with inhibited sexual excitement 02/01/2018     Priority: Medium    Hypercholesterolemia 02/01/2018     Priority: Medium    Prediabetes 02/01/2018     Priority: Medium    Diverticulosis of sigmoid colon 01/25/2016     Priority: Medium    Health care maintenance 01/21/2016     Priority: Medium    Coronary atherosclerosis 09/08/2013     Priority: Medium     Overview:   August 2012 PRAVIN stent placed at [facility] in [place]. Moderate diffuse disease in LAD and Circumflex.       Hip pain 07/03/2012     Priority: Medium    Tick bite 04/25/2011     Priority: Medium       10 point ROS of systems including Constitutional, Eyes, Respiratory, Cardiovascular, Gastroenterology, Genitourinary, Integumentary, Muscularskeletal, Psychiatric were all negative except for pertinent positives noted in my HPI.    Current Outpatient Medications   Medication Sig Dispense Refill    acetaminophen (TYLENOL) 500 MG tablet Take 500-1,000 mg by mouth every 6 hours as needed for mild pain Take at bedtime as needed for shoulder pain      aspirin 81 MG EC tablet Take 81 mg by mouth daily      blood glucose (NO BRAND SPECIFIED) lancets standard Use to test blood sugar 1 time daily 100 Lancet 3    blood glucose (NO BRAND SPECIFIED) test strip Use to test blood sugar 1 times daily. Dx: R73.03 100 strip 3    blood glucose monitoring (ACCU-CHEK ENEDINA PLUS) meter device kit Use to test blood sugar 1 times daily. Dx: R73.03 1 kit 0    blood glucose monitoring (ACCU-CHEK MULTICLIX) lancets Use to test blood sugar 1 times daily. Dx: R73.03 102 each 3    blood glucose monitoring (NO BRAND SPECIFIED) meter device kit Use to test blood sugar 1 time daily 1 kit 0    cholecalciferol (VITAMIN D3) 125 mcg (5000 units) capsule Take 125 mcg by mouth daily      furosemide (LASIX) 20 MG tablet Take lasix 20 mg in the morning only if needed for increased edema in legs. 90 tablet 1     HYDROcodone-acetaminophen (NORCO) 5-325 MG tablet Take 1-2 tablets by mouth every 4 hours as needed for moderate to severe pain. 40 tablet 0    lisinopril (ZESTRIL) 5 MG tablet Take 1 tablet (5 mg) by mouth daily 90 tablet 4    metoprolol succinate ER (TOPROL XL) 25 MG 24 hr tablet Take 0.5 tablets (12.5 mg) by mouth daily 45 tablet 3    Multiple Vitamins-Minerals (MULTIVITAMIN & MINERAL PO) Take 1 tablet by mouth      nitroGLYcerin (NITROSTAT) 0.4 MG sublingual tablet PLACE 1 TABLET UNDER THE TONGUE EVERY 5 MINUTES IF NEEDED FOR CHEST PAIN. 25 tablet 0    potassium chloride taco ER (KLOR-CON M10) 10 MEQ CR tablet TAKE 2 TABS BY MOUTH ON DAYS YOU TAKE LASIX 180 tablet 0    rosuvastatin (CRESTOR) 40 MG tablet Take 1 tablet (40 mg) by mouth daily 90 tablet 4    sildenafil (VIAGRA) 50 MG tablet Take 1 tablet (50 mg) by mouth daily as needed (Erectile dysfunction) 10 tablet 11       OBJECTIVE:  There were no vitals taken for this visit.    Physical Exam     ---  This note was written with the assistance of the Dragon voice-dictation technology software. The final document, although reviewed, may contain errors. For corrections, please contact the office.    Total time spent preparing to see the patient, review of chart, obtaining history and physical examination, review of sleep testing, review of treatment options, education, discussion with patient and documenting in Epic / EMR was 20 minutes.  All time involved was spent on the day of service for the patient (the same day as the patient's appointment).    Jamaal Valdivia MD    Sleep Medicine  [place], [place]  Main Office: [phone]  [facility] [place], [place]  [address], [address]  Schedule visits: [phone]  Main Office: [phone]  Fax: [phone]    Answers submitted by the patient for this  visit:  General Questionnaire (Submitted on 10/9/2024)  Chief Complaint: Chronic problems general questions HPI Form  What is the reason for your visit today? : Follow-up to determine results of use of mandibular adjustment device for improving sleep apnea.  How many servings of fruits and vegetables do you eat daily?: 4 or more  On average, how many sweetened beverages do you drink each day (Examples: soda, juice, sweet tea, etc.  Do NOT count diet or artificially sweetened beverages)?: 0  How many minutes a day do you exercise enough to make your heart beat faster?: 30 to 60  How many days a week do you exercise enough to make your heart beat faster?: 5  How many days per week do you miss taking your medication?: 0

## 2024-10-14 ENCOUNTER — THERAPY VISIT (OUTPATIENT)
Dept: PHYSICAL THERAPY | Facility: OTHER | Age: 69
End: 2024-10-14
Attending: ORTHOPAEDIC SURGERY
Payer: MEDICARE

## 2024-10-14 ENCOUNTER — OFFICE VISIT (OUTPATIENT)
Dept: FAMILY MEDICINE | Facility: OTHER | Age: 69
End: 2024-10-14
Attending: FAMILY MEDICINE
Payer: COMMERCIAL

## 2024-10-14 DIAGNOSIS — Z95.1 S/P CABG X 3: ICD-10-CM

## 2024-10-14 DIAGNOSIS — I25.810 CORONARY ARTERY DISEASE INVOLVING AUTOLOGOUS ARTERY CORONARY BYPASS GRAFT WITHOUT ANGINA PECTORIS: Primary | ICD-10-CM

## 2024-10-14 DIAGNOSIS — G47.33 OSA (OBSTRUCTIVE SLEEP APNEA): ICD-10-CM

## 2024-10-14 DIAGNOSIS — I10 ESSENTIAL HYPERTENSION: ICD-10-CM

## 2024-10-14 DIAGNOSIS — M75.102 TEAR OF LEFT SUPRASPINATUS TENDON: Primary | ICD-10-CM

## 2024-10-14 PROCEDURE — 97016 VASOPNEUMATIC DEVICE THERAPY: CPT | Mod: GP,PO

## 2024-10-14 PROCEDURE — 97110 THERAPEUTIC EXERCISES: CPT | Mod: GP,PO

## 2024-10-14 PROCEDURE — G0463 HOSPITAL OUTPT CLINIC VISIT: HCPCS

## 2024-10-14 PROCEDURE — 99213 OFFICE O/P EST LOW 20 MIN: CPT | Performed by: FAMILY MEDICINE

## 2024-10-16 ENCOUNTER — THERAPY VISIT (OUTPATIENT)
Dept: PHYSICAL THERAPY | Facility: OTHER | Age: 69
End: 2024-10-16
Attending: ORTHOPAEDIC SURGERY
Payer: MEDICARE

## 2024-10-16 DIAGNOSIS — Z95.1 S/P CABG X 3: ICD-10-CM

## 2024-10-16 DIAGNOSIS — M75.102 TEAR OF LEFT SUPRASPINATUS TENDON: Primary | ICD-10-CM

## 2024-10-16 PROCEDURE — 97110 THERAPEUTIC EXERCISES: CPT | Mod: GP,PO

## 2024-10-16 PROCEDURE — 97016 VASOPNEUMATIC DEVICE THERAPY: CPT | Mod: GP,PO

## 2024-10-16 RX ORDER — POTASSIUM CHLORIDE 750 MG/1
TABLET, EXTENDED RELEASE ORAL
Qty: 180 TABLET | Refills: 1 | Status: SHIPPED | OUTPATIENT
Start: 2024-10-16

## 2024-10-16 NOTE — TELEPHONE ENCOUNTER
"Requested Prescriptions   Pending Prescriptions Disp Refills    potassium chloride taco ER (KLOR-CON M10) 10 MEQ CR tablet [Pharmacy Med Name: KLOR-CON M10 TABLET] 180 tablet 0     Sig: TAKE 2 TABS BY MOUTH ON DAYS YOU TAKE LASIX       Potassium Supplements Protocol Passed - 10/16/2024  8:30 AM        Passed - Medication is active on med list        Passed - Medication indicated for associated diagnosis     Potassium is associated with one of the following diagnoses:    Hypokalemia    Hypokalemia prophylaxis   Hypertension   Heart failure   Edema        Passed - Recent (12 mo) or future (90 days) visit within the authorizing provider's department     The patient must have completed an in-person or virtual visit within the past 12 months or has a future visit scheduled within the next 90 days with the authorizing provider s specialty.  Urgent care and e-visits do not quality as an office visit for this protocol.        Passed - Patient is age 18 or older        Passed - Normal serum potassium in past 12 months     Recent Labs   Lab Test 09/13/24  1536   POTASSIUM 4.1         Last Written Prescription Date:  7/22/24  Last Fill Quantity: 180,   # refills: 0  Last Office Visit: 6/13/24 and note states: \"When you take lasix please take potassium supplement with Lasix dose.\"  Future Office visit:    Next 5 appointments (look out 90 days)      Oct 21, 2024 8:30 AM  (Arrive by 8:15 AM)  Return Visit with Balbir French MD  [facility] ([facility] ) [address]  [place] [address]  [phone]     Dec 12, 2024 8:45 AM  (Arrive by 8:30 AM)  Return Visit with TESSIE Youssef [facility] ([facility] ) [address]  [place] [address]  [phone]     Jan 08, 2025 10:00 AM  (Arrive by 9:45 AM)  Annual Wellness Visit with Erica Jeronimo PA-C  [facility] ([facility] ) [address]  [place] [address]  [phone]     Prescription approved per [facility] Refill Protocol.  Francine Jimenez RN ....................  10/16/2024   10:21 AM

## 2024-10-21 ENCOUNTER — THERAPY VISIT (OUTPATIENT)
Dept: PHYSICAL THERAPY | Facility: OTHER | Age: 69
End: 2024-10-21
Attending: ORTHOPAEDIC SURGERY
Payer: MEDICARE

## 2024-10-21 ENCOUNTER — OFFICE VISIT (OUTPATIENT)
Dept: ORTHOPEDICS | Facility: OTHER | Age: 69
End: 2024-10-21
Attending: ORTHOPAEDIC SURGERY
Payer: MEDICARE

## 2024-10-21 DIAGNOSIS — M75.102 TEAR OF LEFT SUPRASPINATUS TENDON: Primary | ICD-10-CM

## 2024-10-21 DIAGNOSIS — Z98.890 S/P ARTHROSCOPY OF LEFT SHOULDER: Primary | ICD-10-CM

## 2024-10-21 PROCEDURE — 97110 THERAPEUTIC EXERCISES: CPT | Mod: GP,PO

## 2024-10-21 PROCEDURE — G0463 HOSPITAL OUTPT CLINIC VISIT: HCPCS

## 2024-10-21 PROCEDURE — 97016 VASOPNEUMATIC DEVICE THERAPY: CPT | Mod: GP,PO

## 2024-10-21 PROCEDURE — 99495 TRANSJ CARE MGMT MOD F2F 14D: CPT | Performed by: ORTHOPAEDIC SURGERY

## 2024-10-21 PROCEDURE — 99024 POSTOP FOLLOW-UP VISIT: CPT | Performed by: ORTHOPAEDIC SURGERY

## 2024-10-21 NOTE — PROGRESS NOTES
10/21/24 0724   Appointment Info   Signing clinician's name / credentials Graham Rahmanalfonso, PT   Visits Used 6   Medical Diagnosis s/p Left shoulder arthroscopy with subacromial decompression, distal clavicle excision and a small rotator cuff repair   PT Tx Diagnosis Left shoulder pain, impaired range of motion, weakness, postural dysfunction   Progress Note/Certification   Start of Care Date 09/30/24   Onset of illness/injury or Date of Surgery 09/23/24   Therapy Frequency 16 visits   Predicted Duration 8 weeks   Certification date from 09/30/24   Certification date to 11/25/24   GOALS   PT Goals 2;3   PT Goal 1   Goal Identifier Pain   Goal Description Patient will report the ability to sleep through the entire night in  his bed without waking from left shoulder pain.   Rationale to maximize safety and independence with performance of ADLs and functional tasks;to maximize safety and independence within the home;to maximize safety and independence with self cares   Goal Progress Progressing towards goal   Target Date 11/25/24   PT Goal 2   Goal Identifier Range of motion   Goal Description Patient with demonstrate left shoulder flexion range of motion to 130 degrees or greater to allow reaching to an overhead shelf without difficulty or compensation.   Rationale to maximize safety and independence with performance of ADLs and functional tasks;to maximize safety and independence within the home;to maximize safety and independence within the community;to maximize safety and independence with self cares   Goal Progress Progressing towards goal. AAROM is 131 degrees, but has not yet started AROM   Target Date 11/25/24   PT Goal 3   Goal Identifier Weakness   Goal Description Patient will demonstrate left shoulder strength at a 4/5 grade or greater to allow 2 pound object to an overhead shelf without difficulty or compensation.   Rationale to maximize safety and independence with performance of ADLs and functional  tasks;to maximize safety and independence within the home;to maximize safety and independence within the community   Goal Progress Goal pending based on current phase of protocol   Target Date 12/23/24   Subjective Report   Subjective Report Patient reports continued improvement with decreasing pain and improving PROM/AAROM per protocol.  Patient reports the HEP is going well.   Objective Measures   Objective Measures Objective Measure 1;Objective Measure 2   Objective Measure 1   Objective Measure Left shoulder   Details AAROM flexion= 131 degrees.   Objective Measure 2   Objective Measure Left shoulder pain   Details 0-1/10   PT Modalities   PT Modalities Vasopneumatic device   Vasopneumatic Device   Vasopneumatic device minutes (27069) 10   Treatment Detail Nice machine to left shoulder   Vasopneumatic Pressure low   Duration 10 min   Temperature Coldest setting on machine   Patient Response/Progress Positive response to treatment.   Treatment Interventions (PT)   Interventions Therapeutic Procedure/Exercise   Therapeutic Procedure/Exercise   Therapeutic Procedures: strength, endurance, ROM, flexibility minutes (17536) 20   Ther Proc 1 - Details Finger and wrist ROM. Elbow flexion/ext x 15. Pendulum x 2 minutes.  Seated table slide into flexion x 10.  Supine passive left shoulder flexion range of motion.   Supine hands clasped active assistive flexion x 10.  Added: Pulley flexion x 2 minutes.  Wall walk flexion x 10 reps.   Skilled Intervention Education, HEP, range of motion   Patient Response/Progress Patient verbalized and demonstrated understanding of home exercise program.   Education   Learner/Method Patient;Listening;Reading;Demonstration;Pictures/Video;No Barriers to Learning   Plan   Home program Finger and wrist ROM. Elbow flexion/ext x 15. Pendulum x 2 minutes. Perform exercises 3-4 times a day as symptoms allow.  Utilize cold pack after exercises to assist with pain and inflammation reduction.   Updated 10/16:Supine hands clasped active assistive flexion x 10.-Perform 1-2 times a day or symptoms allow.   Plan for next session Progress exercises symptoms allow following Dr. French's small rotator cuff repair protocol.  Manual therapy and modalities as indicated.   Total Session Time   Timed Code Treatment Minutes 20   Total Treatment Time (sum of timed and untimed services) 30         PLAN  Continue therapy per current plan of care.    Beginning/End Dates of Progress Note Reporting Period:    9/30/2024 to 10/21/2024    Referring Provider:  Balbir French

## 2024-10-21 NOTE — PROGRESS NOTES
Subjective:    69-year-old gentleman is now 4 weeks status post left shoulder arthroscopy with subacromial decompression, distal clavicle excision and a small rotator cuff repair.  He is doing really well at this point and no real issues with his left shoulder whatsoever.  Continues on to physical therapy and comes in today wearing a sling.    Objective:    On examination today he is not put through range of motion given proximity to surgery.  He is otherwise doing really well and he is neurovascularly intact.  His wounds are healing nicely    Assessment:    69-year-old gentleman doing well status post small rotator cuff repair left shoulder    Plan:    See him back approximately 6 weeks.  Continue physical therapy.

## 2024-10-23 ENCOUNTER — THERAPY VISIT (OUTPATIENT)
Dept: PHYSICAL THERAPY | Facility: OTHER | Age: 69
End: 2024-10-23
Attending: ORTHOPAEDIC SURGERY
Payer: MEDICARE

## 2024-10-23 DIAGNOSIS — M75.102 TEAR OF LEFT SUPRASPINATUS TENDON: Primary | ICD-10-CM

## 2024-10-23 PROCEDURE — 97016 VASOPNEUMATIC DEVICE THERAPY: CPT | Mod: GP,CQ

## 2024-10-23 PROCEDURE — 97110 THERAPEUTIC EXERCISES: CPT | Mod: GP,CQ

## 2024-10-28 ENCOUNTER — THERAPY VISIT (OUTPATIENT)
Dept: PHYSICAL THERAPY | Facility: OTHER | Age: 69
End: 2024-10-28
Attending: ORTHOPAEDIC SURGERY
Payer: MEDICARE

## 2024-10-28 DIAGNOSIS — M75.102 TEAR OF LEFT SUPRASPINATUS TENDON: Primary | ICD-10-CM

## 2024-10-28 PROCEDURE — 97110 THERAPEUTIC EXERCISES: CPT | Mod: GP,PO

## 2024-10-29 ENCOUNTER — VIRTUAL VISIT (OUTPATIENT)
Dept: SLEEP MEDICINE | Facility: HOSPITAL | Age: 69
End: 2024-10-29
Attending: FAMILY MEDICINE
Payer: MEDICARE

## 2024-10-29 DIAGNOSIS — G47.33 OSA (OBSTRUCTIVE SLEEP APNEA): Primary | ICD-10-CM

## 2024-10-29 NOTE — PROGRESS NOTES
Patient was provided both verbal and written education and instructions on use of Watch PAT device. Watch PAT device has been registered and shipped via [employer] on 10/29/2024. Patient was notified that package was mailed out. Watch PAT serial number: [id number]    Tracking # [id number].

## 2024-11-04 ENCOUNTER — THERAPY VISIT (OUTPATIENT)
Dept: PHYSICAL THERAPY | Facility: OTHER | Age: 69
End: 2024-11-04
Attending: ORTHOPAEDIC SURGERY
Payer: COMMERCIAL

## 2024-11-04 DIAGNOSIS — M75.102 TEAR OF LEFT SUPRASPINATUS TENDON: Primary | ICD-10-CM

## 2024-11-25 ENCOUNTER — THERAPY VISIT (OUTPATIENT)
Dept: PHYSICAL THERAPY | Facility: OTHER | Age: 69
End: 2024-11-25
Attending: ORTHOPAEDIC SURGERY
Payer: COMMERCIAL

## 2024-11-25 DIAGNOSIS — M75.102 TEAR OF LEFT SUPRASPINATUS TENDON: Primary | ICD-10-CM

## 2024-11-26 NOTE — PROGRESS NOTES
11/25/24 1322   Appointment Info   Signing clinician's name / credentials Graham Goode PT   Visits Used 10 (4 of 10 for PN)   Medical Diagnosis s/p Left shoulder arthroscopy with subacromial decompression, distal clavicle excision and a small rotator cuff repair   PT Tx Diagnosis Left shoulder pain, impaired range of motion, weakness, postural dysfunction   Progress Note/Certification   Start of Care Date 09/30/24   Onset of illness/injury or Date of Surgery 09/23/24   Therapy Frequency 8 visits   Predicted Duration 8 weeks   Certification date from 11/25/24   Certification date to 01/21/25   Supervision   PT Assistant Visit Number 1   GOALS   PT Goals 2;3   PT Goal 1   Goal Identifier Pain   Goal Description Patient will report the ability to sleep through the entire night in  his bed without waking from left shoulder pain.   Rationale to maximize safety and independence with performance of ADLs and functional tasks;to maximize safety and independence within the home;to maximize safety and independence with self cares   Goal Progress Goal met   Target Date 11/25/24   Date Met 11/25/24   PT Goal 2   Goal Identifier Range of motion   Goal Description Patient with demonstrate left shoulder flexion range of motion to 130 degrees or greater to allow reaching to an overhead shelf without difficulty or compensation.   Rationale to maximize safety and independence with performance of ADLs and functional tasks;to maximize safety and independence within the home;to maximize safety and independence within the community;to maximize safety and independence with self cares   Goal Progress Goal met   Target Date 11/25/24   Date Met 11/25/24   PT Goal 3   Goal Identifier Weakness   Goal Description Patient will demonstrate left shoulder strength at a 4/5 grade or greater to allow 2 pound object to an overhead shelf without difficulty or compensation.   Rationale to maximize safety and independence with performance of ADLs and  functional tasks;to maximize safety and independence within the home;to maximize safety and independence within the community   Goal Progress Progressing towards goal.  Patient is progressing left shoulder strength gradually.   Target Date 01/21/25   Subjective Report   Subjective Report Patient reports left shoulder continues to improve with range of motion and strength overall.  He reports gradual improvements with left shoulder function.  He reports home exercise program is going well and he is progressing HEP without difficulty.   Objective Measures   Objective Measures Objective Measure 1;Objective Measure 2   Objective Measure 1   Objective Measure Left shoulder ROM   Details Flexion= 150 degrees.  Luuuefhkv=539 degrees.   IR with reach behind the back= L5.  ER with reach behind= Back of head.   Objective Measure 2   Objective Measure Left shoulder pain   Details 0-1/10   PT Modalities   PT Modalities Vasopneumatic device   Treatment Interventions (PT)   Interventions Therapeutic Procedure/Exercise   Therapeutic Procedure/Exercise   Therapeutic Procedures: strength, endurance, ROM, flexibility minutes (22127) 40   Ther Proc 1 - Details UBE x 3 minutes each way.  Standing flexion 1# x 15. Standing Abduction 1# x 15.  Row with green x 25.. IR with red x 25. Side 1# x 15.-Perform strengthening 3 times per week. Work on ROM daily.  Reviewed current home exercise program with updated changes focusing on low weight and high repetition for strengthening.   Skilled Intervention Education, HEP, range of motion   Patient Response/Progress tolerated well no pain throughout ex's   Education   Learner/Method Patient;Listening;Reading;Demonstration;Pictures/Video;No Barriers to Learning   Plan   Home program Finger and wrist ROM. Elbow flexion/ext x 15. Pendulum x 2 minutes. Perform exercises 3-4 times a day as symptoms allow.  Utilize cold pack after exercises to assist with pain and inflammation reduction.  Updated  "10/16:Supine hands clasped active assistive flexion x 10.-Perform 1-2 times a day or symptoms allow.  Updated 10/28:Supine flexion starting at 90 degrees x10-30- pain free arc of motion. Supine protraction x 10-30. Side ER x 10-30. Scap sets x 10, 5\" hold.-Perform once a day or symptoms allow.  Updated: Row with red x 10-25. IR with yellow x 10-25.-Perform strengthening 3 times per week. Work on ROM daily.  Updated 11/25: Standing shoulder flexion to 90 degrees x 10-25 reps..  Standing abduction to just below 90 degrees x 10-25 reps.   Plan for next session Progress exercises symptoms allow following Dr. French's small rotator cuff repair protocol.  Manual therapy and modalities as indicated.   Total Session Time   Timed Code Treatment Minutes 40   Total Treatment Time (sum of timed and untimed services) 40         [facility]                                                                                   OUTPATIENT PHYSICAL THERAPY    PLAN OF TREATMENT FOR OUTPATIENT REHABILITATION   Patient's Last Name, First Name, Kirk Maldonado YOB: 1955   Provider's Name   [facility]   Medical Record No.  [id number]     Onset Date: 09/23/24  Start of Care Date: 09/30/24     Medical Diagnosis:  s/p Left shoulder arthroscopy with subacromial decompression, distal clavicle excision and a small rotator cuff repair      PT Treatment Diagnosis:  Left shoulder pain, impaired range of motion, weakness, postural dysfunction Plan of Treatment  Frequency/Duration: 8 visits/ 8 weeks    Certification date from 11/25/24 to 01/21/25         See note for plan of treatment details and functional goals     Graham Goode, PT                         I CERTIFY THE NEED FOR THESE SERVICES FURNISHED UNDER        THIS PLAN OF TREATMENT AND WHILE UNDER MY CARE     (Physician attestation of this document indicates review and certification of the therapy plan).         "      Referring Provider:  Balbir French    Initial Assessment  See Epic Evaluation- Start of Care Date: 09/30/24

## 2024-12-02 ENCOUNTER — OFFICE VISIT (OUTPATIENT)
Dept: ORTHOPEDICS | Facility: OTHER | Age: 69
End: 2024-12-02
Attending: ORTHOPAEDIC SURGERY
Payer: MEDICARE

## 2024-12-02 DIAGNOSIS — Z98.890 S/P ARTHROSCOPY OF LEFT SHOULDER: Primary | ICD-10-CM

## 2024-12-02 PROCEDURE — G0463 HOSPITAL OUTPT CLINIC VISIT: HCPCS

## 2024-12-02 NOTE — PROGRESS NOTES
SUBJECTIVE:  Kirk is a 69-year-old male now 9 weeks status post a left shoulder arthroscopy with subacromial decompression, distal clavicle excision and small rotator cuff repair.  Today, he reports feeling quite well without any significant concerns or pain.  He has been following with physical therapy weekly and is now working on strengthening without difficulty.    OBJECTIVE:  69 year-old male alert and oriented in no acute distress.  He has full range of motion of the left shoulder and internal rotation to L3.  He has fairly good strength of the left rotator cuff 4/5 in all planes.  The surgical incisions appear to have healed adequately.  He is otherwise neurovascularly intact.    ASSESSMENT/PLAN:  69-year-old male 9 weeks status post left shoulder arthroscopy with subacromial decompression, distal clavicle excision and small rotator cuff repair doing very well.  At this point we we will no longer need to follow-up in clinic.  However, he will continue to follow with physical therapy to work on strengthening.  We will plan to follow-up as needed.

## 2024-12-02 NOTE — TELEPHONE ENCOUNTER
Received a refill request from [facility] [employer] for blood glucose meter.    Per system meter , test strips and lancets note to be refilled in July 2024.  Called [facility] and they state that patient did  meter back in July.    Called patient to see if he was requesting a new meter and patient said no.    Lisa Damon RN on 12/2/2024 at 2:14 PM

## 2024-12-03 ENCOUNTER — THERAPY VISIT (OUTPATIENT)
Dept: PHYSICAL THERAPY | Facility: OTHER | Age: 69
End: 2024-12-03
Attending: ORTHOPAEDIC SURGERY
Payer: MEDICARE

## 2024-12-03 DIAGNOSIS — M75.102 TEAR OF LEFT SUPRASPINATUS TENDON: Primary | ICD-10-CM

## 2024-12-04 DIAGNOSIS — Z95.1 S/P CABG X 3: ICD-10-CM

## 2024-12-04 RX ORDER — FUROSEMIDE 20 MG/1
TABLET ORAL
Qty: 90 TABLET | Refills: 1 | Status: SHIPPED | OUTPATIENT
Start: 2024-12-04

## 2024-12-04 NOTE — TELEPHONE ENCOUNTER
Requested Prescriptions   Pending Prescriptions Disp Refills    furosemide (LASIX) 20 MG tablet [Pharmacy Med Name: FUROSEMIDE 20 MG TABLET] 90 tablet 1     Sig: TAKE 1 TABLET BY MOUTH IN THE MORNING ONLY IF NEEDED FOR INCREASED EDEMA IN LEGS.       Diuretics (Including Combos) Protocol Failed - 12/4/2024  2:37 PM        Failed - Medication indicated for associated diagnosis     Medication is associated with one or more of the following diagnoses:     Edema   Hypertension   Hypercalcemia   Heart Failure   Chronic Kidney Disease (CKD)   Cardiomyopathy   Dyspnea   Chronic Thromboembolic Pulmonary Hypertension   Pulmonary Hypertension        Passed - Most recent blood pressure under 140/90 in past 12 months     BP Readings from Last 3 Encounters:   09/23/24 129/83   09/13/24 138/88   07/08/24 112/66   No data recorded        Passed - Medication is active on med list        Passed - Has GFR on file in past 12 months and most recent value is normal        Passed - Recent (12 mo) or future (90 days) visit within the authorizing provider's specialty     The patient must have completed an in-person or virtual visit within the past 12 months or has a future visit scheduled within the next 90 days with the authorizing provider s specialty.  Urgent care and e-visits do not qualify as an office visit for this protocol.        Passed - Patient is age 18 or older     Last Written Prescription Date:  6/13/24  Last Fill Quantity: 90,   # refills: 1    Last Office Visit: 6/13/24  Future Office visit:    Next 5 appointments (look out 90 days)      Dec 12, 2024 8:45 AM  (Arrive by 8:30 AM)  Return Visit with TESSIE Youssef CNP  [facility] ([facility]) [address]  [place] [address]  [phone]     Jan 08, 2025 10:00 AM  (Arrive by 9:45 AM)  Annual Wellness Visit with Erica Jeronimo PA-C  [facility] ([facility]  [facility]) [address]  [place] [address]  [phone]     Routing refill request to provider for review/approval because:  Failed - Medication indicated for associated diagnosis    Unable to complete prescription refill per RN Medication Refill Policy.   Francine Jimenez RN ....................  12/4/2024   2:38 PM

## 2024-12-10 ENCOUNTER — THERAPY VISIT (OUTPATIENT)
Dept: PHYSICAL THERAPY | Facility: OTHER | Age: 69
End: 2024-12-10
Attending: ORTHOPAEDIC SURGERY
Payer: COMMERCIAL

## 2024-12-10 DIAGNOSIS — M75.102 TEAR OF LEFT SUPRASPINATUS TENDON: Primary | ICD-10-CM

## 2024-12-12 ENCOUNTER — OFFICE VISIT (OUTPATIENT)
Dept: CARDIOLOGY | Facility: OTHER | Age: 69
End: 2024-12-12
Attending: NURSE PRACTITIONER
Payer: MEDICARE

## 2024-12-12 VITALS
HEIGHT: 71 IN | WEIGHT: 241 LBS | OXYGEN SATURATION: 96 % | BODY MASS INDEX: 33.74 KG/M2 | DIASTOLIC BLOOD PRESSURE: 64 MMHG | RESPIRATION RATE: 16 BRPM | TEMPERATURE: 97.4 F | SYSTOLIC BLOOD PRESSURE: 116 MMHG | HEART RATE: 60 BPM

## 2024-12-12 DIAGNOSIS — I10 ESSENTIAL HYPERTENSION: ICD-10-CM

## 2024-12-12 DIAGNOSIS — I97.89 POSTOPERATIVE ATRIAL FIBRILLATION (H): ICD-10-CM

## 2024-12-12 DIAGNOSIS — Z95.5 HISTORY OF CORONARY ARTERY STENT PLACEMENT: ICD-10-CM

## 2024-12-12 DIAGNOSIS — E78.5 HYPERLIPIDEMIA LDL GOAL <70: ICD-10-CM

## 2024-12-12 DIAGNOSIS — I48.91 POSTOPERATIVE ATRIAL FIBRILLATION (H): ICD-10-CM

## 2024-12-12 DIAGNOSIS — I31.8 RESTRICTIVE PERICARDITIS: ICD-10-CM

## 2024-12-12 DIAGNOSIS — Z95.1 S/P CABG X 3: Primary | ICD-10-CM

## 2024-12-12 DIAGNOSIS — Z95.828 HISTORY OF RIGHT COMMON CAROTID ARTERY STENT PLACEMENT: ICD-10-CM

## 2024-12-12 DIAGNOSIS — Z98.890 HISTORY OF RIGHT COMMON CAROTID ARTERY STENT PLACEMENT: ICD-10-CM

## 2024-12-12 DIAGNOSIS — Z87.74 S/P PATENT FORAMEN OVALE CLOSURE: ICD-10-CM

## 2024-12-12 PROCEDURE — G0463 HOSPITAL OUTPT CLINIC VISIT: HCPCS

## 2024-12-12 ASSESSMENT — PAIN SCALES - GENERAL: PAINLEVEL_OUTOF10: NO PAIN (0)

## 2024-12-12 NOTE — PATIENT INSTRUCTIONS
Repeat lab at visit with Erica Jeronimo on 1/8/2024, please come fasting to this visit.   No medication changes at this time.

## 2024-12-12 NOTE — NURSING NOTE
"Chief Complaint   Patient presents with    Cardiac Care     6 month visit        Initial /64 (BP Location: Right arm, Patient Position: Sitting, Cuff Size: Adult Large)   Pulse 60   Temp 97.4  F (36.3  C) (Temporal)   Resp 16   Ht 1.791 m (5' 10.5\")   Wt 109.3 kg (241 lb)   SpO2 96%   BMI 34.09 kg/m   Estimated body mass index is 34.09 kg/m  as calculated from the following:    Height as of this encounter: 1.791 m (5' 10.5\").    Weight as of this encounter: 109.3 kg (241 lb).  Meds Reconciled: complete  Pt is on Aspirin  Pt is on a Statin  Pt is not on Xarelto or Eliquis  Pt is not on a Warfarin   PHQ and/or BRANDO reviewed. Pt referred to PCP/MH Provider as appropriate.    Michelle Zepeda LPN         "

## 2024-12-12 NOTE — PROGRESS NOTES
[facility] HEART CARE   CARDIOLOGY PROGRESS NOTE    Kirk John   [address] [facility] [id number]    Erica Hackett     HPI:   Mrs. John is 69 year old male who presents for cardiology follow-up to visit on 6/13/2024.  Patient recently established cardiology care s/p 3V CABG with PFO closure on 9/22/2023 at [facility]. Patient has a history of HLD, CAD with history of prior coronary stent placement in 2012, prediabetes, diverticulosis and remote history of tobacco use.     Patient has history of TIA, symptoms included slurred speech, left hemiparesis and left facial droop.  Symptoms lasted approximately 20 to 30 minutes with complete resolution.  CT of the head was unremarkable for any acute intracranial findings, CTA demonstrated high-grade stenosis of the right ICA.  He underwent successful stent placement to the right ICA on 5/1/2023.    He has known CAD as listed above. S/p PCI resulting in 2.5 x 38 mm Promus drug-eluting stent to the pLCX in 2012.      TTE (5/1/2023) demonstrated normal LV chamber size, and normal regionality, with LVEF 57%, grade 1 diastolic dysfunction, normal RV chamber size and systolic function, no hemodynamically significant valve disease, and a small right-to-left atrial shunt which increases to severe with Valsalva release.    S/p Coronary Angiography on 6/16/2023 demonstrating three-vessel CAD with severe pLAD stenosis.  Circumflex consists of large ramus branch with prior stenting revealing discrete in-stent restenosis-moderate in severity.  The RCA was proximally occluded with right to right and left to right collaterals.      S/p 3V CABG (LIMA-LAD, rSVG-OM/RCA) with PFO closure by Dr. Roger at [place] on 9/22/2023. He was discharged on Amiodarone taper in setting of post-op AF. Post op echo revealing restrictive pericarditis, treated with colchicine for 6 weeks, no NSAID's. Repeat TTE recommended one month post-op.    Repeat TTE 10/23/2023-hyperdynamic LV  systolic function, LVEF greater than 70%, mild LVH, normal LV diastolic function.  Normal RV size and function.  Both atria appear normal.  No hemodynamically significant valvular abnormalities.  No pulmonary hypertension, RVSP 24.4 mmHg plus RAP.  Pericardial effusion resolved.    INTERVAL HISTORY:  Today, patient reports feeling good without change. No anginal symptoms. No chest pain or pressure. No palpations or recurrence of AF symptoms. No increased dyspnea at rest or with exertion. No orthopnea or PND. Slight LLE, site of SVG, previous US was negative for DVT. No lightheadedness or syncope.     PAST MEDICAL HISTORY:   Past Medical History:   Diagnosis Date    Abnormal result of other cardiovascular function study (CODE)     No Comments Provided    Atherosclerotic heart disease of native coronary artery without angina pectoris     No Comments Provided    Chest pain     No Comments Provided    Essential (primary) hypertension     No Comments Provided    Hyperlipidemia     No Comments Provided    Morbid obesity (H)     Osteoarthritis     No Comments Provided          FAMILY HISTORY:   Family History   Problem Relation Age of Onset    Cancer Father         Cancer,Lung    Peripheral Vascular Disease Father     Heart Disease Mother 70        Heart Disease,CAD/MI    Pacemaker Sister     Heart Disease Maternal Grandfather 60        Heart Disease,CAD    Heart Disease Maternal Uncle 60        Heart Disease,CAD    No Known Problems Sister     Diabetes No family hx of     Prostate Cancer No family hx of     Colon Cancer No family hx of           PAST SURGICAL HISTORY:   Past Surgical History:   Procedure Laterality Date    ARTHROPLASTY HIP      5/2007,Left total hip with Dr Graham Escalante    ARTHROSCOPY SHOULDER ROTATOR CUFF REPAIR, SUBACROMIAL DECOMP, DIST CLAVICLE RESECTN, BICEP TENOTOMY Left 9/23/2024    Procedure: Shoulder Arthroscopy, Subacromial Decompression, DISTAL CLAVICLE EXCISION, Rotator Curff Repair;  Surgeon:  Balbir French MD;  Location: [facility] OR    COLONOSCOPY      6/3/05,Colonoscopy, normal.  F/u in 10 years    COLONOSCOPY  2016    Normal exam,  F/U     CV CARDIAC CATHERIZATION  2012    proximal circumflex, [facility]    RELEASE CARPAL TUNNEL      ,Right carpal tunnel sugery    TONSILLECTOMY, ADENOIDECTOMY, COMBINED      T&A as a child          SOCIAL HISTORY:   Social History     Socioeconomic History    Marital status:      Spouse name: None    Number of children: None    Years of education: None    Highest education level: None   Tobacco Use    Smoking status: Former     Types: Cigarettes     Quit date: 10/11/1984     Years since quittin.0     Passive exposure: Never    Smokeless tobacco: Never    Tobacco comments:     Last smoked over 40 years ago-early s   Vaping Use    Vaping Use: Never used   Substance and Sexual Activity    Alcohol use: Yes     Comment: 2 a week    Drug use: Never    Sexual activity: Yes     Partners: Female   Social History Narrative    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  . 3 daughters and 4 grandkids.  Likes to hunt and fish.  Plays pickleball.     Social Determinants of Health     Financial Resource Strain: Low Risk  (10/1/2023)    Financial Resource Strain     Within the past 12 months, have you or your family members you live with been unable to get utilities (heat, electricity) when it was really needed?: No   Food Insecurity: Low Risk  (10/1/2023)    Food Insecurity     Within the past 12 months, did you worry that your food would run out before you got money to buy more?: No     Within the past 12 months, did the food you bought just not last and you didn t have money to get more?: No   Transportation Needs: Low Risk  (10/1/2023)    Transportation Needs     Within the past 12 months, has lack of transportation kept you from medical appointments, getting your medicines, non-medical meetings or appointments, work, or from getting things  that you need?: No   Housing Stability: Low Risk  (10/1/2023)    Housing Stability     Do you have housing? : Yes     Are you worried about losing your housing?: No          CURRENT MEDICATIONS:   Current Outpatient Medications   Medication Sig Dispense Refill    acetaminophen (TYLENOL) 500 MG tablet Take 500-1,000 mg by mouth every 6 hours as needed for mild pain Take at bedtime as needed for shoulder pain      aspirin 81 MG EC tablet Take 81 mg by mouth daily      blood glucose (NO BRAND SPECIFIED) lancets standard Use to test blood sugar 1 time daily 100 Lancet 3    blood glucose (NO BRAND SPECIFIED) test strip Use to test blood sugar 1 times daily. Dx: R73.03 100 strip 3    blood glucose monitoring (NO BRAND SPECIFIED) meter device kit Use to test blood sugar 1 time daily 1 kit 0    cholecalciferol (VITAMIN D3) 125 mcg (5000 units) capsule Take 125 mcg by mouth daily      furosemide (LASIX) 20 MG tablet TAKE 1 TABLET BY MOUTH IN THE MORNING ONLY IF NEEDED FOR INCREASED EDEMA IN LEGS. 90 tablet 1    lisinopril (ZESTRIL) 5 MG tablet Take 1 tablet (5 mg) by mouth daily 90 tablet 4    metoprolol succinate ER (TOPROL XL) 25 MG 24 hr tablet Take 0.5 tablets (12.5 mg) by mouth daily 45 tablet 3    Multiple Vitamins-Minerals (MULTIVITAMIN & MINERAL PO) Take 1 tablet by mouth      nitroGLYcerin (NITROSTAT) 0.4 MG sublingual tablet PLACE 1 TABLET UNDER THE TONGUE EVERY 5 MINUTES IF NEEDED FOR CHEST PAIN. 25 tablet 0    potassium chloride taco ER (KLOR-CON M10) 10 MEQ CR tablet TAKE 2 TABS BY MOUTH ON DAYS YOU TAKE LASIX 180 tablet 1    rosuvastatin (CRESTOR) 40 MG tablet Take 1 tablet (40 mg) by mouth daily 90 tablet 4    sildenafil (VIAGRA) 50 MG tablet Take 1 tablet (50 mg) by mouth daily as needed (Erectile dysfunction) 10 tablet 11    blood glucose monitoring (ACCU-CHEK ENEDINA PLUS) meter device kit Use to test blood sugar 1 times daily. Dx: R73.03 1 kit 0    blood glucose monitoring (ACCU-CHEK MULTICLIX) lancets Use  "to test blood sugar 1 times daily. Dx: R73.03 102 each 3    HYDROcodone-acetaminophen (NORCO) 5-325 MG tablet Take 1-2 tablets by mouth every 4 hours as needed for moderate to severe pain. 40 tablet 0     No current facility-administered medications for this visit.         ALLERGIES:   Allergies   Allergen Reactions    Poison Ivy Extract Other (See Comments)     Hospitalized as a child, whole body reaction including the throat.        ROS:   CONSTITUTIONAL: No reported fever or chills.  Weight stable.  ENT: No visual disturbance, ear ache, epistaxis or sore throat.   CARDIOVASCULAR: No chest pain, chest pressure or chest discomfort. No palpitations.   RESPIRATORY: No shortness of breath, cough, wheezing or hemoptysis.  No orthopnea or PND.  GI: No reported abdominal pain, melena or hematochezia.  : No reported hematuria or dysuria.   NEUROLOGICAL: No lightheadedness, dizziness, syncope, ataxia, paresthesias or weakness.   HEMATOLOGIC: No history of anemia. No bleeding or excessive bruising. No history of blood clots.   MUSCULOSKELETAL: No new joint pain or swelling, no muscle pain.  ENDOCRINOLOGIC: No temperature intolerance. No hair or skin changes. Chronic left shoulder pain.   SKIN: No abnormal rashes or sores, no unusual itching.   PSYCHIATRIC: No history of depression or anxiety. No changes in mood, feeling down or anxious. No changes in sleep.      PHYSICAL EXAM:   /64 (BP Location: Right arm, Patient Position: Sitting, Cuff Size: Adult Large)   Pulse 60   Temp 97.4  F (36.3  C) (Temporal)   Resp 16   Ht 1.791 m (5' 10.5\")   Wt 109.3 kg (241 lb)   SpO2 96%   BMI 34.09 kg/m    GENERAL: The patient is a well-developed, well-nourished, in no apparent distress.  HEENT: Head is normocephalic and atraumatic. Eyes are symmetrical with normal visual tracking. No icterus, no xanthelasmas. Nares appeared normal without nasal drainage. Mucous membranes are moist, no cyanosis.  NECK: Supple, no cervical " bruits, JVP not visible.   CHEST/ LUNGS: Lungs clear to auscultation, no rales, rhonchi or wheezes, no use of accessory muscles, no retractions, respirations unlabored and normal respiratory rate.   CARDIO: Regular rate and rhythm normal with S1 and S2, no S3 or S4 and no murmur, click or rub.  Sternotomy site healed well, normal exam.   ABD: Abdomen is nondistended.   EXTREMITIES: Trace LLE edema.  MUSCULOSKELETAL: No visible joint swelling.   NEUROLOGIC: Alert and oriented X3. Normal speech, gait and affect. No focal neurologic deficits.   SKIN: No jaundice. No rashes or visible skin lesions present. No ecchymosis.     LAB RESULTS:   Office Visit on 09/13/2024   Component Date Value Ref Range Status    Ventricular Rate 09/13/2024 66  BPM Final    Atrial Rate 09/13/2024 66  BPM Final    RI Interval 09/13/2024 238  ms Final    QRS Duration 09/13/2024 110  ms Final    QT 09/13/2024 420  ms Final    QTc 09/13/2024 440  ms Final    P Axis 09/13/2024 44  degrees Final    R AXIS 09/13/2024 -1  degrees Final    T Axis 09/13/2024 49  degrees Final    Interpretation ECG 09/13/2024    Final                    Value:Sinus rhythm with 1st degree A-V block with occasional Premature ventricular complexes  Possible Left atrial enlargement  Nonspecific T wave abnormality  Abnormal ECG  When compared with ECG of 11-Oct-2023 14:59,  Premature ventricular complexes are now Present  Questionable change in QRS axis  T wave inversion no longer evident in Inferior leads  Nonspecific T wave abnormality no longer evident in Anterior leads  Confirmed by MD KORINA, TRACY ([id number]) on 9/16/2024 5:18:53 PM      Sodium 09/13/2024 141  135 - 145 mmol/L Final    Potassium 09/13/2024 4.1  3.4 - 5.3 mmol/L Final    Chloride 09/13/2024 105  98 - 107 mmol/L Final    Carbon Dioxide (CO2) 09/13/2024 27  22 - 29 mmol/L Final    Anion Gap 09/13/2024 9  7 - 15 mmol/L Final    Urea Nitrogen 09/13/2024 22.8  8.0 - 23.0 mg/dL Final    Creatinine 09/13/2024  1.05  0.67 - 1.17 mg/dL Final    GFR Estimate 09/13/2024 77  >60 mL/min/1.73m2 Final    eGFR calculated using 2021 CKD-EPI equation.    Calcium 09/13/2024 9.3  8.8 - 10.4 mg/dL Final    Reference intervals for this test were updated on 7/16/2024 to reflect our healthy population more accurately. There may be differences in the flagging of prior results with similar values performed with this method. Those prior results can be interpreted in the context of the updated reference intervals.    Glucose 09/13/2024 108 (H)  70 - 99 mg/dL Final    Hemoglobin A1C 09/13/2024 5.7  4.0 - 6.2 % Final    WBC Count 09/13/2024 4.6  4.0 - 11.0 10e3/uL Final    RBC Count 09/13/2024 4.44  4.40 - 5.90 10e6/uL Final    Hemoglobin 09/13/2024 14.8  13.3 - 17.7 g/dL Final    Hematocrit 09/13/2024 43.2  40.0 - 53.0 % Final    MCV 09/13/2024 97  78 - 100 fL Final    MCH 09/13/2024 33.3 (H)  26.5 - 33.0 pg Final    MCHC 09/13/2024 34.3  31.5 - 36.5 g/dL Final    RDW 09/13/2024 13.4  10.0 - 15.0 % Final    Platelet Count 09/13/2024 78 (L)  150 - 450 10e3/uL Final    % Neutrophils 09/13/2024 39  % Final    % Lymphocytes 09/13/2024 55  % Final    % Monocytes 09/13/2024 4  % Final    % Eosinophils 09/13/2024 1  % Final    % Basophils 09/13/2024 0  % Final    % Immature Granulocytes 09/13/2024 0  % Final    NRBCs per 100 WBC 09/13/2024 0  <1 /100 Final    Absolute Neutrophils 09/13/2024 1.8  1.6 - 8.3 10e3/uL Final    Absolute Lymphocytes 09/13/2024 2.6  0.8 - 5.3 10e3/uL Final    Absolute Monocytes 09/13/2024 0.2  0.0 - 1.3 10e3/uL Final    Absolute Eosinophils 09/13/2024 0.1  0.0 - 0.7 10e3/uL Final    Absolute Basophils 09/13/2024 0.0  0.0 - 0.2 10e3/uL Final    Absolute Immature Granulocytes 09/13/2024 0.0  <=0.4 10e3/uL Final    Absolute NRBCs 09/13/2024 0.0  10e3/uL Final           ASSESSMENT:   Kirk John presents for cardiology follow-up to visit on 6/13/2024.  Patient recently established cardiology care s/p 3V CABG with PFO closure  on 9/22/2023 at [facility]. Patient has a history of HLD, CAD with history of prior coronary stent placement in 2012, prediabetes, diverticulosis and remote history of tobacco use.   Today, patient reports feeling good without change. No anginal symptoms. No chest pain or pressure. No palpations or recurrence of AF symptoms. No increased dyspnea at rest or with exertion. No orthopnea or PND. Slight LLE, site of SVG, previous US was negative for DVT. No lightheadedness or syncope.     PLAN:  1. S/P CABG x 3 (9/22/2023) (Primary)  -S/p 3V CABG (LIMA-LAD, rSVG-OM/RCA) with PFO closure by Dr. Roger at [place] on 9/22/2023.   -Complete Plavix 75 mg at 3 months post-op. Continue on ASA 81 mg daily lifelong.   -Continue on Crestor 40 mg daily with LDL goal 70 or less recommended.   -Continue with cardiac risk factor optimization.    - Lipid Profile; Future  - Comprehensive metabolic panel; Future    2. S/P patent foramen ovale closure (9/22/2023)    3. Postoperative atrial fibrillation (H)  Completed Amiodarone taper.   No recurrence of AF identified.     4. Restrictive pericarditis- Post op CABG  Repeat echocardiogram at one month post-op with resolution of pericardial effusion.   Patient completed full 6 weeks of Colchicine.   No recurrence of chest pain.    5. History of coronary artery stent placement (2012)  Known CAD with a 2.5 x 38 mm Promus drug-eluting stent to the proximal circumflex in 2012.  Remain on ASA 81 mg daily life long.     6. History of right common carotid artery stent placement (5/1/2023)  Right ICA stenosis, subacute right frontal infarcts s/p stent placement (EV3 Protege 10x30).  Vascular recommended DAPT- aspirin 81 and plavix 75 daily for 90 days.  Continue on high intensity statin.     7. Hyperlipidemia LDL goal <70  - Lipid Profile; Future  - Comprehensive metabolic panel; Future    8. Essential hypertension  BP well controlled on current medication regimen.       Follow-up with cardiology in  1 year, sooner if needed.     Thank you for allowing me to participate in the care of your patient. Please do not hesitate to contact me if you have any questions.     TESSIE Cuevas CNP [facility]

## 2024-12-17 ENCOUNTER — THERAPY VISIT (OUTPATIENT)
Dept: PHYSICAL THERAPY | Facility: OTHER | Age: 69
End: 2024-12-17
Attending: ORTHOPAEDIC SURGERY
Payer: COMMERCIAL

## 2024-12-17 DIAGNOSIS — M75.102 TEAR OF LEFT SUPRASPINATUS TENDON: Primary | ICD-10-CM

## 2025-01-03 SDOH — HEALTH STABILITY: PHYSICAL HEALTH: ON AVERAGE, HOW MANY MINUTES DO YOU ENGAGE IN EXERCISE AT THIS LEVEL?: 30 MIN

## 2025-01-03 SDOH — HEALTH STABILITY: PHYSICAL HEALTH: ON AVERAGE, HOW MANY DAYS PER WEEK DO YOU ENGAGE IN MODERATE TO STRENUOUS EXERCISE (LIKE A BRISK WALK)?: 5 DAYS

## 2025-01-03 ASSESSMENT — PATIENT HEALTH QUESTIONNAIRE - PHQ9
SUM OF ALL RESPONSES TO PHQ QUESTIONS 1-9: 1
10. IF YOU CHECKED OFF ANY PROBLEMS, HOW DIFFICULT HAVE THESE PROBLEMS MADE IT FOR YOU TO DO YOUR WORK, TAKE CARE OF THINGS AT HOME, OR GET ALONG WITH OTHER PEOPLE: NOT DIFFICULT AT ALL
SUM OF ALL RESPONSES TO PHQ QUESTIONS 1-9: 1

## 2025-01-03 ASSESSMENT — ANXIETY QUESTIONNAIRES
5. BEING SO RESTLESS THAT IT IS HARD TO SIT STILL: NOT AT ALL
GAD7 TOTAL SCORE: 0
7. FEELING AFRAID AS IF SOMETHING AWFUL MIGHT HAPPEN: NOT AT ALL
3. WORRYING TOO MUCH ABOUT DIFFERENT THINGS: NOT AT ALL
IF YOU CHECKED OFF ANY PROBLEMS ON THIS QUESTIONNAIRE, HOW DIFFICULT HAVE THESE PROBLEMS MADE IT FOR YOU TO DO YOUR WORK, TAKE CARE OF THINGS AT HOME, OR GET ALONG WITH OTHER PEOPLE: NOT DIFFICULT AT ALL
1. FEELING NERVOUS, ANXIOUS, OR ON EDGE: NOT AT ALL
GAD7 TOTAL SCORE: 0
8. IF YOU CHECKED OFF ANY PROBLEMS, HOW DIFFICULT HAVE THESE MADE IT FOR YOU TO DO YOUR WORK, TAKE CARE OF THINGS AT HOME, OR GET ALONG WITH OTHER PEOPLE?: NOT DIFFICULT AT ALL
2. NOT BEING ABLE TO STOP OR CONTROL WORRYING: NOT AT ALL
GAD7 TOTAL SCORE: 0
6. BECOMING EASILY ANNOYED OR IRRITABLE: NOT AT ALL
4. TROUBLE RELAXING: NOT AT ALL
7. FEELING AFRAID AS IF SOMETHING AWFUL MIGHT HAPPEN: NOT AT ALL

## 2025-01-03 ASSESSMENT — SOCIAL DETERMINANTS OF HEALTH (SDOH): HOW OFTEN DO YOU GET TOGETHER WITH FRIENDS OR RELATIVES?: TWICE A WEEK

## 2025-01-08 ENCOUNTER — OFFICE VISIT (OUTPATIENT)
Dept: FAMILY MEDICINE | Facility: OTHER | Age: 70
End: 2025-01-08
Attending: ORTHOPAEDIC SURGERY
Payer: MEDICARE

## 2025-01-08 VITALS
HEART RATE: 65 BPM | DIASTOLIC BLOOD PRESSURE: 70 MMHG | OXYGEN SATURATION: 96 % | RESPIRATION RATE: 18 BRPM | BODY MASS INDEX: 34.9 KG/M2 | WEIGHT: 243.8 LBS | TEMPERATURE: 97.9 F | HEIGHT: 70 IN | SYSTOLIC BLOOD PRESSURE: 128 MMHG

## 2025-01-08 DIAGNOSIS — E66.811 CLASS 1 OBESITY DUE TO EXCESS CALORIES WITH SERIOUS COMORBIDITY AND BODY MASS INDEX (BMI) OF 32.0 TO 32.9 IN ADULT: ICD-10-CM

## 2025-01-08 DIAGNOSIS — N52.9 ERECTILE DYSFUNCTION, UNSPECIFIED ERECTILE DYSFUNCTION TYPE: ICD-10-CM

## 2025-01-08 DIAGNOSIS — M05.79 RHEUMATOID ARTHRITIS INVOLVING MULTIPLE SITES WITH POSITIVE RHEUMATOID FACTOR (H): ICD-10-CM

## 2025-01-08 DIAGNOSIS — M75.102 TEAR OF LEFT SUPRASPINATUS TENDON: ICD-10-CM

## 2025-01-08 DIAGNOSIS — I48.91 POSTOPERATIVE ATRIAL FIBRILLATION (H): ICD-10-CM

## 2025-01-08 DIAGNOSIS — N40.0 BENIGN PROSTATIC HYPERPLASIA WITHOUT LOWER URINARY TRACT SYMPTOMS: ICD-10-CM

## 2025-01-08 DIAGNOSIS — H61.23 BILATERAL IMPACTED CERUMEN: ICD-10-CM

## 2025-01-08 DIAGNOSIS — I97.89 POSTOPERATIVE ATRIAL FIBRILLATION (H): ICD-10-CM

## 2025-01-08 DIAGNOSIS — Z00.00 ENCOUNTER FOR MEDICARE ANNUAL WELLNESS EXAM: Primary | ICD-10-CM

## 2025-01-08 DIAGNOSIS — Z12.5 SCREENING FOR PROSTATE CANCER: ICD-10-CM

## 2025-01-08 DIAGNOSIS — L57.0 ACTINIC KERATOSIS: ICD-10-CM

## 2025-01-08 DIAGNOSIS — E78.5 HYPERLIPIDEMIA LDL GOAL <70: ICD-10-CM

## 2025-01-08 DIAGNOSIS — R73.03 PREDIABETES: ICD-10-CM

## 2025-01-08 DIAGNOSIS — E66.09 CLASS 1 OBESITY DUE TO EXCESS CALORIES WITH SERIOUS COMORBIDITY AND BODY MASS INDEX (BMI) OF 32.0 TO 32.9 IN ADULT: ICD-10-CM

## 2025-01-08 DIAGNOSIS — G47.33 OSA (OBSTRUCTIVE SLEEP APNEA): ICD-10-CM

## 2025-01-08 DIAGNOSIS — I25.810 CORONARY ARTERY DISEASE INVOLVING AUTOLOGOUS ARTERY CORONARY BYPASS GRAFT WITHOUT ANGINA PECTORIS: ICD-10-CM

## 2025-01-08 DIAGNOSIS — Z23 NEED FOR COVID-19 VACCINE: ICD-10-CM

## 2025-01-08 DIAGNOSIS — Z95.1 S/P CABG X 3: ICD-10-CM

## 2025-01-08 DIAGNOSIS — I10 ESSENTIAL HYPERTENSION: ICD-10-CM

## 2025-01-08 LAB
ALBUMIN SERPL BCG-MCNC: 4.4 G/DL (ref 3.5–5.2)
ALP SERPL-CCNC: 69 U/L (ref 40–150)
ALT SERPL W P-5'-P-CCNC: 44 U/L (ref 0–70)
ANION GAP SERPL CALCULATED.3IONS-SCNC: 9 MMOL/L (ref 7–15)
AST SERPL W P-5'-P-CCNC: 32 U/L (ref 0–45)
BASOPHILS # BLD AUTO: 0 10E3/UL (ref 0–0.2)
BASOPHILS NFR BLD AUTO: 0 %
BILIRUB SERPL-MCNC: 0.9 MG/DL
BUN SERPL-MCNC: 25.1 MG/DL (ref 8–23)
CALCIUM SERPL-MCNC: 9.8 MG/DL (ref 8.8–10.4)
CHLORIDE SERPL-SCNC: 104 MMOL/L (ref 98–107)
CHOLEST SERPL-MCNC: 137 MG/DL
CREAT SERPL-MCNC: 0.94 MG/DL (ref 0.67–1.17)
CREAT UR-MCNC: 216 MG/DL
EGFRCR SERPLBLD CKD-EPI 2021: 88 ML/MIN/1.73M2
EOSINOPHIL # BLD AUTO: 0.1 10E3/UL (ref 0–0.7)
EOSINOPHIL NFR BLD AUTO: 1 %
ERYTHROCYTE [DISTWIDTH] IN BLOOD BY AUTOMATED COUNT: 13.5 % (ref 10–15)
EST. AVERAGE GLUCOSE BLD GHB EST-MCNC: 117 MG/DL
FASTING STATUS PATIENT QL REPORTED: YES
FASTING STATUS PATIENT QL REPORTED: YES
GLUCOSE SERPL-MCNC: 120 MG/DL (ref 70–99)
HBA1C MFR BLD: 5.7 %
HCO3 SERPL-SCNC: 27 MMOL/L (ref 22–29)
HCT VFR BLD AUTO: 46.1 % (ref 40–53)
HDLC SERPL-MCNC: 45 MG/DL
HGB BLD-MCNC: 16.1 G/DL (ref 13.3–17.7)
IMM GRANULOCYTES # BLD: 0 10E3/UL
IMM GRANULOCYTES NFR BLD: 0 %
LDLC SERPL CALC-MCNC: 75 MG/DL
LYMPHOCYTES # BLD AUTO: 3.2 10E3/UL (ref 0.8–5.3)
LYMPHOCYTES NFR BLD AUTO: 63 %
MCH RBC QN AUTO: 34.1 PG (ref 26.5–33)
MCHC RBC AUTO-ENTMCNC: 34.9 G/DL (ref 31.5–36.5)
MCV RBC AUTO: 98 FL (ref 78–100)
MICROALBUMIN UR-MCNC: 794.4 MG/L
MICROALBUMIN/CREAT UR: 367.78 MG/G CR (ref 0–17)
MONOCYTES # BLD AUTO: 0.2 10E3/UL (ref 0–1.3)
MONOCYTES NFR BLD AUTO: 5 %
NEUTROPHILS # BLD AUTO: 1.6 10E3/UL (ref 1.6–8.3)
NEUTROPHILS NFR BLD AUTO: 31 %
NONHDLC SERPL-MCNC: 92 MG/DL
NRBC # BLD AUTO: 0 10E3/UL
NRBC BLD AUTO-RTO: 0 /100
PLATELET # BLD AUTO: 96 10E3/UL (ref 150–450)
POTASSIUM SERPL-SCNC: 4.7 MMOL/L (ref 3.4–5.3)
PROT SERPL-MCNC: 7.1 G/DL (ref 6.4–8.3)
PSA SERPL DL<=0.01 NG/ML-MCNC: 2.11 NG/ML (ref 0–4.5)
RBC # BLD AUTO: 4.72 10E6/UL (ref 4.4–5.9)
SODIUM SERPL-SCNC: 140 MMOL/L (ref 135–145)
TRIGL SERPL-MCNC: 85 MG/DL
WBC # BLD AUTO: 5.1 10E3/UL (ref 4–11)

## 2025-01-08 PROCEDURE — 69209 REMOVE IMPACTED EAR WAX UNI: CPT | Performed by: PHYSICIAN ASSISTANT

## 2025-01-08 PROCEDURE — 80061 LIPID PANEL: CPT | Mod: ZL | Performed by: PHYSICIAN ASSISTANT

## 2025-01-08 PROCEDURE — 36415 COLL VENOUS BLD VENIPUNCTURE: CPT | Mod: ZL | Performed by: PHYSICIAN ASSISTANT

## 2025-01-08 PROCEDURE — 90480 ADMN SARSCOV2 VAC 1/ONLY CMP: CPT

## 2025-01-08 PROCEDURE — 17000 DESTRUCT PREMALG LESION: CPT | Performed by: PHYSICIAN ASSISTANT

## 2025-01-08 PROCEDURE — 80053 COMPREHEN METABOLIC PANEL: CPT | Mod: ZL | Performed by: PHYSICIAN ASSISTANT

## 2025-01-08 PROCEDURE — 82043 UR ALBUMIN QUANTITATIVE: CPT | Mod: ZL | Performed by: PHYSICIAN ASSISTANT

## 2025-01-08 PROCEDURE — G0463 HOSPITAL OUTPT CLINIC VISIT: HCPCS | Mod: 25

## 2025-01-08 PROCEDURE — 84153 ASSAY OF PSA TOTAL: CPT | Mod: ZL | Performed by: PHYSICIAN ASSISTANT

## 2025-01-08 PROCEDURE — 83036 HEMOGLOBIN GLYCOSYLATED A1C: CPT | Mod: ZL | Performed by: PHYSICIAN ASSISTANT

## 2025-01-08 PROCEDURE — 85025 COMPLETE CBC W/AUTO DIFF WBC: CPT | Mod: ZL | Performed by: PHYSICIAN ASSISTANT

## 2025-01-08 RX ORDER — SILDENAFIL 50 MG/1
50 TABLET, FILM COATED ORAL DAILY PRN
Qty: 10 TABLET | Refills: 11 | Status: SHIPPED | OUTPATIENT
Start: 2025-01-08

## 2025-01-08 RX ORDER — ROSUVASTATIN CALCIUM 40 MG/1
40 TABLET, COATED ORAL DAILY
Qty: 90 TABLET | Refills: 4 | Status: SHIPPED | OUTPATIENT
Start: 2025-01-08

## 2025-01-08 RX ORDER — METOPROLOL SUCCINATE 25 MG/1
12.5 TABLET, EXTENDED RELEASE ORAL DAILY
Qty: 45 TABLET | Refills: 4 | Status: SHIPPED | OUTPATIENT
Start: 2025-01-08

## 2025-01-08 RX ORDER — LISINOPRIL 5 MG/1
5 TABLET ORAL DAILY
Qty: 90 TABLET | Refills: 4 | Status: SHIPPED | OUTPATIENT
Start: 2025-01-08

## 2025-01-08 ASSESSMENT — PAIN SCALES - GENERAL: PAINLEVEL_OUTOF10: NO PAIN (0)

## 2025-01-08 NOTE — NURSING NOTE
The ear canal was irrigated withbody-temperature tap water with the jet of water directed superiorly.  The ear canal was then re-examined and cleared of the impaction.  The patient tolerated the procedure well.  Fariba Sloan LPN ....................1/8/2025   10:37 AM

## 2025-01-08 NOTE — NURSING NOTE
"Chief Complaint   Patient presents with    Medicare Visit     WELLNESS         Initial BP (!) 148/92 (BP Location: Right arm, Patient Position: Sitting, Cuff Size: Adult Large)   Pulse 65   Temp 97.9  F (36.6  C) (Temporal)   Resp 18   Ht 1.784 m (5' 10.25\")   Wt 110.6 kg (243 lb 12.8 oz)   SpO2 96%   BMI 34.73 kg/m   Estimated body mass index is 34.73 kg/m  as calculated from the following:    Height as of this encounter: 1.784 m (5' 10.25\").    Weight as of this encounter: 110.6 kg (243 lb 12.8 oz).  Medication Review: complete    The next two questions are to help us understand your food security.  If you are feeling you need any assistance in this area, we have resources available to support you today.          1/3/2025   SDOH- Food Insecurity   Within the past 12 months, did you worry that your food would run out before you got money to buy more? N   Within the past 12 months, did the food you bought just not last and you didn t have money to get more? N         Health Care Directive:  Patient has a Health Care Directive on file      Teresita Johnson LPN      "

## 2025-01-08 NOTE — PROGRESS NOTES
Preventive Care Visit  [facility] AND [facility]  Erica Jeronimo PA-C, Family Medicine  Jan 8, 2025      Assessment & Plan       ICD-10-CM    1. Encounter for Medicare annual wellness exam  Z00.00       2. Coronary artery disease involving autologous artery coronary bypass graft without angina pectoris  I25.810 CBC and Differential     CBC and Differential      3. S/P CABG x 3  Z95.1       4. Essential hypertension  I10 lisinopril (ZESTRIL) 5 MG tablet      5. Postoperative atrial fibrillation (H)  I97.89     I48.91       6. Hyperlipidemia LDL goal <70  E78.5 rosuvastatin (CRESTOR) 40 MG tablet     Lipid Profile     Comprehensive metabolic panel      7. ANGEL (obstructive sleep apnea)  G47.33       8. Tear of left supraspinatus tendon  M75.102       9. Prediabetes  R73.03 Albumin Random Urine Quantitative with Creat Ratio     Hemoglobin A1c     Albumin Random Urine Quantitative with Creat Ratio     Hemoglobin A1c      10. Class 1 obesity due to excess calories with serious comorbidity and body mass index (BMI) of 32.0 to 32.9 in adult  E66.811     E66.09     Z68.32       11. Rheumatoid arthritis involving multiple sites with positive rheumatoid factor (H)  M05.79       12. Bilateral impacted cerumen  H61.23 NM REMOVAL IMPACTED CERUMEN IRRIGATION/LVG UNILAT      13. Erectile dysfunction, unspecified erectile dysfunction type  N52.9 sildenafil (VIAGRA) 50 MG tablet      14. Benign prostatic hyperplasia without lower urinary tract symptoms  N40.0 Prostate Specific Antigen     Prostate Specific Antigen      15. Screening for prostate cancer  Z12.5 Prostate Specific Antigen     Prostate Specific Antigen      16. Actinic keratosis  L57.0 DESTRUC BENIGN/PREMAL,1ST LESION [23982]      17. Need for COVID-19 vaccine  Z23 COVID-19 12+ (PFIZER)        Medicare wellness examination completed today. Reviewed care gaps.   Coronary artery disease with bypass - stable. Ongoing close follow up with cardiology. Update routine  lab studies including: CBC, CMP, Lipid.   S/p CABG - stable. Close primary care and cardiology follow up.   Essential hypertension - blood pressures are well controlled. Update CMP and routine lab work today. Goal blood pressures under 135/85. Ongoing low salt diet.   Postoperative atrial fibrillation - no concerns today. Return precautions reviewed.   Hyperlipidemia - Lipid screening updated today. Cholesterol 137 (previously 137 in 2023), triglycerides 85 (previously 76), HDL 45 (previously 52), LDL 75 (previously 70). Continue statin + healthy diet.   ANGEL - stable with mandibular . Ongoing close follow up.   Tear of left supraspinatus tendon - recent surgery. Last PT session next week. Range of motion is excellent.   Prediabetes - A1c stable at 5.7%, borderline prediabetes. Recommend ongoing healthy diet.   Class I obesity - Recommend increased lean proteins, fruits and vegetable intake. Goal exercise 150 minutes of moderate exercise per week (walking is great exercise). Weight loss would benefit cardiopulmonary and overall health.   Rheumatoid arthritis - stable. No pharmacotherapy.   Bilateral cerumen impacted - cerumen flush performed by nursing team today. Tolerated well. Recommend debrox at home.   Erectile dysfunction - no acute changes. No red flags. Utilize Sildenafil as needed. Avoid other vasodilating agents.   BPH - asymptomatic. Return precautions reviewed.   Screening PSA - normal 2.11.   Actinic keratosis - right side of face x 1 cm. Cryotherapy performed today. ABCDE (asymmetry, border, color, diameter/size and evolution) to watch for reviewed.   Vaccine counseling performed today in regards to COVID booster. No previous immunization reactions. No recent or current illness. Vaccine kindly administered by nursing staff today.     Patient has been advised of split billing requirements and indicates understanding: Yes    BMI  Estimated body mass index is 34.73 kg/m  as calculated from the  "following:    Height as of this encounter: 1.784 m (5' 10.25\").    Weight as of this encounter: 110.6 kg (243 lb 12.8 oz).   Weight management plan: Discussed healthy diet and exercise guidelines    Counseling  Appropriate preventive services were addressed with this patient via screening, questionnaire, or discussion as appropriate for fall prevention, nutrition, physical activity, Tobacco-use cessation, social engagement, weight loss and cognition.  Checklist reviewing preventive services available has been given to the patient.  Reviewed patient's diet, addressing concerns and/or questions.   The patient was provided with written information regarding signs of hearing loss.     The longitudinal plan of care for the diagnosis(es)/condition(s) as documented were addressed during this visit. Due to the added complexity in care, I will continue to support Kirk in the subsequent management and with ongoing continuity of care.    See Patient Instructions    No follow-ups on file.    Ivelisse Bradley is a 69 year old, presenting for the following:  Medicare Visit (WELLNESS/)        1/8/2025     9:50 AM   Additional Questions   Roomed by JOVON Lo    Kirk presents to the clinic today for annual physical. He underwent a left shoulder arthroscopy with subacromial decompression, distal clavicle excision and small rotator cuff repair on 9/23/24 with Dr. Manolo MD. He has been following closely with orthopedics and PT teams.     Cardiac history: 3 vessel CABG and PFO closure on 9/22/23 at [facility]. History of TIA with stenting of right ICA on 5/1/23. + hyperlipidemia, hypertension and remote history of tobacco use. Continues with cardiology follow up.   - Continues on Lasix 20 mg PRN for edema, Potassium 10 mEQ with lasix, Lisinpril 5 mg daily, Metoprolol 12.5 mg daily, Crestor 40 mg daily.     DUE: CBC, CMP, Lipid, Microalbumin, PSA  Colon cancer screening 1/25/16 - 10 year follow up.   TDAP 11/22/23    ANGEL " well controlled with mandibular . Did not tolerate CPAP due to claustrophobia.     Diabetes Follow-up  How often are you checking your blood sugar? A few times a week  What time of day are you checking your blood sugars (select all that apply)?  Before and after meals  Have you had any blood sugars above 200?  No  Have you had any blood sugars below 70?  No  What symptoms do you notice when your blood sugar is low?  Shaky, Dizzy, Weak, and Lethargy  What concerns do you have today about your diabetes? None   Do you have any of these symptoms? (Select all that apply)  No numbness or tingling in feet.  No redness, sores or blisters on feet.  No complaints of excessive thirst.  No reports of blurry vision.  No significant changes to weight.    Hyperlipidemia Follow-Up  Are you regularly taking any medication or supplement to lower your cholesterol?   Yes- Crestor  Are you having muscle aches or other side effects that you think could be caused by your cholesterol lowering medication?  No    Hypertension Follow-up  Do you check your blood pressure regularly outside of the clinic? Yes   Are you following a low salt diet? Yes  Are your blood pressures ever more than 140 on the top number (systolic) OR more   than 90 on the bottom number (diastolic), for example 140/90? No    BP Readings from Last 2 Encounters:   01/08/25 128/70   12/12/24 116/64     Hemoglobin A1C (%)   Date Value   01/08/2025 5.7 (H)   09/13/2024 5.7   09/02/2020 6.0   03/06/2019 6.0     LDL Cholesterol Calculated (mg/dL)   Date Value   01/08/2025 75   01/04/2024 70   09/02/2020 79   03/06/2019 85         Vascular Disease Follow-up  How often do you take nitroglycerin? Never  Do you take an aspirin every day? Yes    Health Care Directive  Patient has a Health Care Directive on file  Advance care planning document is on file but is outdated.  Patient encouraged to update.        1/3/2025   General Health   How would you rate your overall  physical health? Good   Feel stress (tense, anxious, or unable to sleep) Not at all         1/3/2025   Nutrition   Diet: Low salt    Low fat/cholesterol       Multiple values from one day are sorted in reverse-chronological order         1/3/2025   Exercise   Days per week of moderate/strenous exercise 5 days   Average minutes spent exercising at this level 30 min         1/3/2025   Social Factors   Frequency of gathering with friends or relatives Twice a week   Worry food won't last until get money to buy more No   Food not last or not have enough money for food? No   Do you have housing? (Housing is defined as stable permanent housing and does not include staying ouside in a car, in a tent, in an abandoned building, in an overnight shelter, or couch-surfing.) Yes   Are you worried about losing your housing? No   Lack of transportation? No   Unable to get utilities (heat,electricity)? No         1/3/2025   Fall Risk   Fallen 2 or more times in the past year? No   Trouble with walking or balance? No          1/3/2025   Activities of Daily Living- Home Safety   Needs help with the following daily activites None of the above   Safety concerns in the home None of the above         1/3/2025   Dental   Dentist two times every year? Yes         1/3/2025   Hearing Screening   Hearing concerns? (!) IT'S HARD TO FOLLOW A CONVERSATION IN A NOISY RESTAURANT OR CROWDED ROOM.         1/3/2025   Driving Risk Screening   Patient/family members have concerns about driving No         1/3/2025   General Alertness/Fatigue Screening   Have you been more tired than usual lately? No         1/3/2025   Urinary Incontinence Screening   Bothered by leaking urine in past 6 months No         1/3/2025   TB Screening   Were you born outside of the [place]? No       Today's PHQ-9 Score:       1/3/2025     2:33 PM   PHQ-9 SCORE   PHQ-9 Total Score MyChart 1 (Minimal depression)   PHQ-9 Total Score 1        Patient-reported         1/3/2025   Substance  Use   Alcohol more than 3/day or more than 7/wk No   Do you have a current opioid prescription? No   How severe/bad is pain from 1 to 10? 1/10   Do you use any other substances recreationally? No     Social History     Tobacco Use     Smoking status: Former     Current packs/day: 0.00     Average packs/day: 1 pack/day for 9.8 years (9.8 ttl pk-yrs)     Types: Cigarettes     Start date:      Quit date: 10/11/1984     Years since quittin.2     Passive exposure: Never     Smokeless tobacco: Never     Tobacco comments:     Last smoked over 40 years ago-early 80s   Vaping Use     Vaping status: Never Used   Substance Use Topics     Alcohol use: Yes     Comment: 2 a week     Drug use: Never     Last PSA:   PSA Tumor Marker   Date Value Ref Range Status   2025 2.11 0.00 - 4.50 ng/mL Final     ASCVD Risk   The ASCVD Risk score (Sandip PEREZ, et al., 2019) failed to calculate for the following reasons:    Risk score cannot be calculated because patient has a medical history suggesting prior/existing ASCVD    Reviewed and updated as needed this visit by Provider   Tobacco  Allergies  Meds  Problems  Med Hx  Surg Hx  Fam Hx           Past Medical History:   Diagnosis Date     Abnormal result of other cardiovascular function study (CODE)     No Comments Provided     Atherosclerotic heart disease of native coronary artery without angina pectoris     No Comments Provided     Chest pain     No Comments Provided     Essential (primary) hypertension     No Comments Provided     Hyperlipidemia     No Comments Provided     Morbid obesity (H)      Osteoarthritis     No Comments Provided     Past Surgical History:   Procedure Laterality Date     ARTHROPLASTY HIP      2007,Left total hip with Dr Graham Escalante     ARTHROSCOPY SHOULDER ROTATOR CUFF REPAIR, SUBACROMIAL DECOMP, DIST CLAVICLE RESECTN, BICEP TENOTOMY Left 2024    Procedure: Shoulder Arthroscopy, Subacromial Decompression, DISTAL CLAVICLE  EXCISION, Rotator Curff Repair;  Surgeon: Balbir French MD;  Location: [facility] OR     COLONOSCOPY      6/3/05,Colonoscopy, normal.  F/u in 10 years     COLONOSCOPY  01/25/2016    Normal exam,  F/U 2026     CV CARDIAC CATHERIZATION  08/31/2012    proximal circumflex, [facility]     RELEASE CARPAL TUNNEL      5/04,Right carpal tunnel sugery     TONSILLECTOMY, ADENOIDECTOMY, COMBINED      T&A as a child     Current providers sharing in care for this patient include:  Patient Care Team:  Erica Jeronimo PA-C as PCP - General (Family Medicine)  Erica Jeronimo PA-C as Assigned PCP  Corazon Dawkins APRN CNP as Assigned Heart and Vascular Provider  Jamaal Valdivia MD as Assigned Sleep Provider  Balbir French MD as Assigned Musculoskeletal Provider  Erica Jeronimo PA-C as Physician Assistant (Family Medicine)  Balbir French MD as MD (Orthopaedic Surgery)    The following health maintenance items are reviewed in Epic and correct as of today:  Health Maintenance   Topic Date Due     MICROALBUMIN  01/04/2025     MEDICARE ANNUAL WELLNESS VISIT  01/08/2026     BMP  01/08/2026     LIPID  01/08/2026     FALL RISK ASSESSMENT  01/08/2026     COLORECTAL CANCER SCREENING  01/25/2026     GLUCOSE  01/08/2028     ADVANCE CARE PLANNING  01/08/2030     DTAP/TDAP/TD IMMUNIZATION (4 - Td or Tdap) 11/22/2033     HEPATITIS C SCREENING  Completed     PHQ-2 (once per calendar year)  Completed     INFLUENZA VACCINE  Completed     Pneumococcal Vaccine: 50+ Years  Completed     URINALYSIS  Completed     ZOSTER IMMUNIZATION  Completed     RSV VACCINE  Completed     AORTIC ANEURYSM SCREENING (SYSTEM ASSIGNED)  Completed     COVID-19 Vaccine  Completed     HPV IMMUNIZATION  Aged Out     MENINGITIS IMMUNIZATION  Aged Out     RSV MONOCLONAL ANTIBODY  Aged Out     Review of Systems  Constitutional, HEENT, cardiovascular, pulmonary, GI, , musculoskeletal, neuro, skin, endocrine and psych systems are negative, except as otherwise  "noted.    Objective    Exam  /70 (BP Location: Right arm, Patient Position: Sitting, Cuff Size: Adult Large)   Pulse 65   Temp 97.9  F (36.6  C) (Temporal)   Resp 18   Ht 1.784 m (5' 10.25\")   Wt 110.6 kg (243 lb 12.8 oz)   SpO2 96%   BMI 34.73 kg/m     Estimated body mass index is 34.73 kg/m  as calculated from the following:    Height as of this encounter: 1.784 m (5' 10.25\").    Weight as of this encounter: 110.6 kg (243 lb 12.8 oz).    Physical Exam  GENERAL: alert and no distress  EYES: Eyes grossly normal to inspection  HENT: normal cephalic/atraumatic, both ears: occluded with wax, nose and mouth without ulcers or lesions, oropharynx clear, and oral mucous membranes moist  NECK: no adenopathy, no asymmetry, masses, or scars  RESP: lungs clear to auscultation - no rales, rhonchi or wheezes  CV: regular rate and rhythm, normal S1 S2, no S3 or S4, no murmur, click or rub, no peripheral edema  ABDOMEN: soft, nontender, no hepatosplenomegaly, no masses and bowel sounds normal  MS: no gross musculoskeletal defects noted, no edema  SKIN: 1 cm actinic keratosis to right side of forehead.   NEURO: Normal strength and tone and mentation intact  PSYCH: mentation appears normal, affect normal/bright, judgement and insight intact, and appearance well groomed  LYMPH: normal ant/post cervical, supraclavicular nodes        1/8/2025   Mini Cog   Clock Draw Score 2 Normal   3 Item Recall 3 objects recalled   Mini Cog Total Score 5         Results for orders placed or performed in visit on 01/08/25   Lipid Profile     Status: None   Result Value Ref Range    Cholesterol 137 <200 mg/dL    Triglycerides 85 <150 mg/dL    Direct Measure HDL 45 >=40 mg/dL    LDL Cholesterol Calculated 75 <100 mg/dL    Non HDL Cholesterol 92 <130 mg/dL    Patient Fasting > 8hrs? Yes     Narrative    Cholesterol  Desirable: < 200 mg/dL  Borderline High: 200 - 239 mg/dL  High: >= 240 mg/dL    Triglycerides  Normal: < 150 mg/dL  Borderline " High: 150 - 199 mg/dL  High: 200-499 mg/dL  Very High: >= 500 mg/dL    Direct Measure HDL  Female: >= 50 mg/dL   Male: >= 40 mg/dL    LDL Cholesterol  Desirable: < 100 mg/dL  Above Desirable: 100 - 129 mg/dL   Borderline High: 130 - 159 mg/dL   High:  160 - 189 mg/dL   Very High: >= 190 mg/dL    Non HDL Cholesterol  Desirable: < 130 mg/dL  Above Desirable: 130 - 159 mg/dL  Borderline High: 160 - 189 mg/dL  High: 190 - 219 mg/dL  Very High: >= 220 mg/dL   Comprehensive metabolic panel     Status: Abnormal   Result Value Ref Range    Sodium 140 135 - 145 mmol/L    Potassium 4.7 3.4 - 5.3 mmol/L    Carbon Dioxide (CO2) 27 22 - 29 mmol/L    Anion Gap 9 7 - 15 mmol/L    Urea Nitrogen 25.1 (H) 8.0 - 23.0 mg/dL    Creatinine 0.94 0.67 - 1.17 mg/dL    GFR Estimate 88 >60 mL/min/1.73m2    Calcium 9.8 8.8 - 10.4 mg/dL    Chloride 104 98 - 107 mmol/L    Glucose 120 (H) 70 - 99 mg/dL    Alkaline Phosphatase 69 40 - 150 U/L    AST 32 0 - 45 U/L    ALT 44 0 - 70 U/L    Protein Total 7.1 6.4 - 8.3 g/dL    Albumin 4.4 3.5 - 5.2 g/dL    Bilirubin Total 0.9 <=1.2 mg/dL    Patient Fasting > 8hrs? Yes    Prostate Specific Antigen     Status: Normal   Result Value Ref Range    PSA Tumor Marker 2.11 0.00 - 4.50 ng/mL    Narrative    This result is obtained using the Roche Elecsys total PSA method on the melody e601 immunoassay analyzer, which is an ultrasensitive method. Results obtained with different assay methods or kits cannot be used interchangeably.  An undetectable (<0.01 ng/mL) ultrasensitive prostate-specific antigen (USPSA) concentration after radical prostatectomy is reassuring and may aid in postoperative risk stratification of patients.     A detectable USPSA concentration (> or =0.01 ng/mL) after radical prostatectomy (RP) does not necessarily translate into disease progression or recurrence. Interpretation of a detectable USPSA needs to be made in conjunction with other clinicopathologic risk factors. The cutpoint for  interpretation of USPSA assays remains controversial and has ranged from 0.01 to 0.05 ng/mL. For example, in a study that included 754 men after RP, a cutpoint of 0.01 ng/mL was an independent predictor of biochemical recurrence (BCR). BCR-free survival at 5 years was 92.4% for patients with an USPSA post-RP of less than 0.01 ng/mL and 56.8% for patients with an USPSA post-RP of 0.01 ng/mL or higher.(1) In the same study a cutoff of 0.03 ng/mL also predicted BCR independent of clinicopathological factors and BCR-free survival at 5 yrs was 90.8% for patients with an USPSA post-RP of less than 0.03 ng/mL and 26.9% for patients with a PSA post-RP of greater or equal to 0.03 ng/mL. (1)    1. Jimi HERNANDEZ, Pipe Z, Chacorta DW, et al. Do ultrasensitive prostate specific antigen measurements have a role in predicting long-term biochemical recurrence-free survival in men after radical prostatectomy? J Urol. 2016;195(2):330-336. doi:10.1016/j.juro.2015.08.080   Hemoglobin A1c     Status: Abnormal   Result Value Ref Range    Estimated Average Glucose 117 (H) <117 mg/dL    Hemoglobin A1C 5.7 (H) <5.7 %   CBC with platelets and differential     Status: Abnormal   Result Value Ref Range    WBC Count 5.1 4.0 - 11.0 10e3/uL    RBC Count 4.72 4.40 - 5.90 10e6/uL    Hemoglobin 16.1 13.3 - 17.7 g/dL    Hematocrit 46.1 40.0 - 53.0 %    MCV 98 78 - 100 fL    MCH 34.1 (H) 26.5 - 33.0 pg    MCHC 34.9 31.5 - 36.5 g/dL    RDW 13.5 10.0 - 15.0 %    Platelet Count 96 (L) 150 - 450 10e3/uL    % Neutrophils 31 %    % Lymphocytes 63 %    % Monocytes 5 %    % Eosinophils 1 %    % Basophils 0 %    % Immature Granulocytes 0 %    NRBCs per 100 WBC 0 <1 /100    Absolute Neutrophils 1.6 1.6 - 8.3 10e3/uL    Absolute Lymphocytes 3.2 0.8 - 5.3 10e3/uL    Absolute Monocytes 0.2 0.0 - 1.3 10e3/uL    Absolute Eosinophils 0.1 0.0 - 0.7 10e3/uL    Absolute Basophils 0.0 0.0 - 0.2 10e3/uL    Absolute Immature Granulocytes 0.0 <=0.4 10e3/uL    Absolute NRBCs 0.0  10e3/uL   CBC and Differential     Status: Abnormal    Narrative    The following orders were created for panel order CBC and Differential.  Procedure                               Abnormality         Status                     ---------                               -----------         ------                     CBC with platelets and d...[[id number]]  Abnormal            Final result                 Please view results for these tests on the individual orders.     Signed Electronically by: Erica Jeronimo PA-C    Answers submitted by the patient for this visit:  Patient Health Questionnaire (Submitted on 1/3/2025)  If you checked off any problems, how difficult have these problems made it for you to do your work, take care of things at home, or get along with other people?: Not difficult at all  PHQ9 TOTAL SCORE: 1  Patient Health Questionnaire (G7) (Submitted on 1/3/2025)  BRANDO 7 TOTAL SCORE: 0

## 2025-01-08 NOTE — PATIENT INSTRUCTIONS
Patient Education   Preventive Care Advice   This is general advice given by our system to help you stay healthy. However, your care team may have specific advice just for you. Please talk to your care team about your preventive care needs.  Nutrition  Eat 5 or more servings of fruits and vegetables each day.  Try wheat bread, brown rice and whole grain pasta (instead of white bread, rice, and pasta).  Get enough calcium and vitamin D. Check the label on foods and aim for 100% of the RDA (recommended daily allowance).  Lifestyle  Exercise at least 150 minutes each week  (30 minutes a day, 5 days a week).  Do muscle strengthening activities 2 days a week. These help control your weight and prevent disease.  No smoking.  Wear sunscreen to prevent skin cancer.  Have a dental exam and cleaning every 6 months.  Yearly exams  See your health care team every year to talk about:  Any changes in your health.  Any medicines your care team has prescribed.  Preventive care, family planning, and ways to prevent chronic diseases.  Shots (vaccines)   HPV shots (up to age 26), if you've never had them before.  Hepatitis B shots (up to age 59), if you've never had them before.  COVID-19 shot: Get this shot when it's due.  Flu shot: Get a flu shot every year.  Tetanus shot: Get a tetanus shot every 10 years.  Pneumococcal, hepatitis A, and RSV shots: Ask your care team if you need these based on your risk.  Shingles shot (for age 50 and up)  General health tests  Diabetes screening:  Starting at age 35, Get screened for diabetes at least every 3 years.  If you are younger than age 35, ask your care team if you should be screened for diabetes.  Cholesterol test: At age 39, start having a cholesterol test every 5 years, or more often if advised.  Bone density scan (DEXA): At age 50, ask your care team if you should have this scan for osteoporosis (brittle bones).  Hepatitis C: Get tested at least once in your life.  STIs (sexually  transmitted infections)  Before age 24: Ask your care team if you should be screened for STIs.  After age 24: Get screened for STIs if you're at risk. You are at risk for STIs (including HIV) if:  You are sexually active with more than one person.  You don't use condoms every time.  You or a partner was diagnosed with a sexually transmitted infection.  If you are at risk for HIV, ask about PrEP medicine to prevent HIV.  Get tested for HIV at least once in your life, whether you are at risk for HIV or not.  Cancer screening tests  Cervical cancer screening: If you have a cervix, begin getting regular cervical cancer screening tests starting at age 21.  Breast cancer scan (mammogram): If you've ever had breasts, begin having regular mammograms starting at age 40. This is a scan to check for breast cancer.  Colon cancer screening: It is important to start screening for colon cancer at age 45.  Have a colonoscopy test every 10 years (or more often if you're at risk) Or, ask your provider about stool tests like a FIT test every year or Cologuard test every 3 years.  To learn more about your testing options, visit:   .  For help making a decision, visit:   https://bit.ly/oc68608.  Prostate cancer screening test: If you have a prostate, ask your care team if a prostate cancer screening test (PSA) at age 55 is right for you.  Lung cancer screening: If you are a current or former smoker ages 50 to 80, ask your care team if ongoing lung cancer screenings are right for you.  For informational purposes only. Not to replace the advice of your health care provider. Copyright   2023 [facility] [employer]. All rights reserved. Clinically reviewed by the [facility]. Message[employer] 290668 - REV 01/24.  Hearing Loss: Care Instructions  Overview     Hearing loss is a sudden or slow decrease in how well you hear. It can range from slight to profound. Permanent hearing loss can occur with aging. It also can  happen when you are exposed long-term to loud noise. Examples include listening to loud music, riding motorcycles, or being around other loud machines.  Hearing loss can affect your work and home life. It can make you feel lonely or depressed. You may feel that you have lost your independence. But hearing aids and other devices can help you hear better and feel connected to others.  Follow-up care is a key part of your treatment and safety. Be sure to make and go to all appointments, and call your doctor if you are having problems. It's also a good idea to know your test results and keep a list of the medicines you take.  How can you care for yourself at home?  Avoid loud noises whenever possible. This helps keep your hearing from getting worse.  Always wear hearing protection around loud noises.  Wear a hearing aid as directed.  A professional can help you pick a hearing aid that will work best for you.  You can also get hearing aids over the counter for mild to moderate hearing loss.  Have hearing tests as your doctor suggests. They can show whether your hearing has changed. Your hearing aid may need to be adjusted.  Use other devices as needed. These may include:  Telephone amplifiers and hearing aids that can connect to a television, stereo, radio, or microphone.  Devices that use lights or vibrations. These alert you to the doorbell, a ringing telephone, or a baby monitor.  Television closed-captioning. This shows the words at the bottom of the screen. Most new TVs can do this.  TTY (text telephone). This lets you type messages back and forth on the telephone instead of talking or listening. These devices are also called TDD. When messages are typed on the keyboard, they are sent over the phone line to a receiving TTY. The message is shown on a monitor.  Use text messaging, social media, and email if it is hard for you to communicate by telephone.  Try to learn a listening technique called speechreading. It is  "not lipreading. You pay attention to people's gestures, expressions, posture, and tone of voice. These clues can help you understand what a person is saying. Face the person you are talking to, and have them face you. Make sure the lighting is good. You need to see the other person's face clearly.  Think about counseling if you need help to adjust to your hearing loss.  When should you call for help?  Watch closely for changes in your health, and be sure to contact your doctor if:    You think your hearing is getting worse.     You have new symptoms, such as dizziness or nausea.   Where can you learn more?  Go to https://www.[employer].net/patiented  Enter R798 in the search box to learn more about \"Hearing Loss: Care Instructions.\"  Current as of: September 27, 2023  Content Version: 14.3    2024 [employer].   Care instructions adapted under license by your healthcare professional. If you have questions about a medical condition or this instruction, always ask your healthcare professional. [employer] disclaims any warranty or liability for your use of this information.       "

## 2025-01-16 ENCOUNTER — THERAPY VISIT (OUTPATIENT)
Dept: PHYSICAL THERAPY | Facility: OTHER | Age: 70
End: 2025-01-16
Attending: ORTHOPAEDIC SURGERY
Payer: COMMERCIAL

## 2025-01-16 DIAGNOSIS — M75.102 TEAR OF LEFT SUPRASPINATUS TENDON: Primary | ICD-10-CM

## 2025-01-16 NOTE — PROGRESS NOTES
01/16/25 0856   Appointment Info   Signing clinician's name / credentials Graham Goode, PT, OCS   Visits Used 14 (4 of 10 for PN)   Medical Diagnosis s/p Left shoulder arthroscopy with subacromial decompression, distal clavicle excision and a small rotator cuff repair   PT Tx Diagnosis Left shoulder pain, impaired range of motion, weakness, postural dysfunction   Progress Note/Certification   Start of Care Date 09/30/24   Onset of illness/injury or Date of Surgery 09/23/24   Therapy Frequency 8 visits   Predicted Duration 8 weeks   Certification date from 11/25/24   Certification date to 01/21/25   Supervision   PT Assistant Visit Number 2   GOALS   PT Goals 2;3   PT Goal 1   Goal Identifier Pain   Goal Description Patient will report the ability to sleep through the entire night in  his bed without waking from left shoulder pain.   Rationale to maximize safety and independence with performance of ADLs and functional tasks;to maximize safety and independence within the home;to maximize safety and independence with self cares   Goal Progress Goal met   Target Date 11/25/24   Date Met 11/25/24   PT Goal 2   Goal Identifier Range of motion   Goal Description Patient with demonstrate left shoulder flexion range of motion to 130 degrees or greater to allow reaching to an overhead shelf without difficulty or compensation.   Rationale to maximize safety and independence with performance of ADLs and functional tasks;to maximize safety and independence within the home;to maximize safety and independence within the community;to maximize safety and independence with self cares   Goal Progress Goal met   Target Date 11/25/24   Date Met 11/25/24   PT Goal 3   Goal Identifier Weakness   Goal Description Patient will demonstrate left shoulder strength at a 4/5 grade or greater to allow 2 pound object to an overhead shelf without difficulty or compensation.   Rationale to maximize safety and independence with performance of ADLs  and functional tasks;to maximize safety and independence within the home;to maximize safety and independence within the community   Goal Progress Goal met   Target Date 01/21/25   Date Met 01/16/25   Subjective Report   Subjective Report Patient reports continued improvement with the left shoulder.  Patient reports current home exercise program is going well.  Patient states he is continue to work on his home program 3 days a week for strengthening and daily for range of motion.  He has been able to independently progress exercises without difficulty.  From a functional standpoint patient reports continued gains with functional strength and movement of the left shoulder.  He feels ready to discharge at this time.   Objective Measures   Objective Measures Objective Measure 1;Objective Measure 2;Objective Measure 3   Objective Measure 1   Objective Measure Left shoulder ROM   Details Flexion= 155 degrees.  Pmskuabvm=698 degrees.   IR with reach behind the back= L5.  ER with reach behind= Back of head.   Objective Measure 2   Objective Measure Left shoulder pain   Details 0-1/10   Objective Measure 3   Objective Measure Left shoulder strength   Details Flexion= 4/5. Abduction=4/5. IR=4+/5. ER=4/5   PT Modalities   PT Modalities Vasopneumatic device   Treatment Interventions (PT)   Interventions Therapeutic Procedure/Exercise   Therapeutic Procedure/Exercise   Therapeutic Procedures: strength, endurance, ROM, flexibility minutes (77976) 40   Ther Proc 1 - Details UBE x 3 minutes each way.  Standing flexion 2#  x 25. Standing Abduction 2#  x 25.  Row with blue x 25.  Shoulder extension with blue band x 25 reps.  IR with green x 25. Side ER 2# x 15. Supine protraction 3# x 25.  Bicep curl 5# x 25-Perform strengthening 3 times per week. Work on ROM daily.   current home exercise program focusing on low weight and high repetition for strengthening.  Time spent reviewing home exercise program and progression of strengthening  "exercises following a low weight and high repetition program..   Skilled Intervention Education, HEP, range of motion   Patient Response/Progress tolerated well no pain throughout ex's   Education   Learner/Method Patient;Listening;Reading;Demonstration;Pictures/Video;No Barriers to Learning   Plan   Home program Finger and wrist ROM. Elbow flexion/ext x 15. Pendulum x 2 minutes. Perform exercises 3-4 times a day as symptoms allow.  Utilize cold pack after exercises to assist with pain and inflammation reduction.  Updated 10/16:Supine hands clasped active assistive flexion x 10.-Perform 1-2 times a day or symptoms allow.  Updated 10/28:Supine flexion starting at 90 degrees x10-30- pain free arc of motion. Supine protraction x 10-30. Side ER x 10-30. Scap sets x 10, 5\" hold.-Perform once a day or symptoms allow.  Updated: Row with red x 10-25. IR with yellow x 10-25.-Perform strengthening 3 times per week. Work on ROM daily.  Updated 11/25: Standing shoulder flexion to 90 degrees x 10-25 reps..  Standing abduction to just below 90 degrees x 10-25 reps.  Updated 12/10: Standing flexion 2# x 15. Standing Abduction 2# x 15. Row with blue x 15. standing tricep press with gr band x 25, IR with green x 25. Side ER 2# x 15. Supine protraction 3# x 15. Added: Bicep curl 5# x 15-perform strengthening 3 times a week working up to 25 reps each exercises symptoms allow.  Continue to work on range of motion daily.   Plan for next session Progress exercises symptoms allow following Dr. French's small rotator cuff repair protocol.  Manual therapy and modalities as indicated.   Total Session Time   Timed Code Treatment Minutes 40   Total Treatment Time (sum of timed and untimed services) 40         DISCHARGE  Reason for Discharge: Patient has met all goals.      Discharge Plan: Patient to continue home program.  Patient encouraged to contact physical therapy with any questions or concerns.  Patient agrees with this " plan.    Referring Provider:  Balbir French

## 2025-02-27 ENCOUNTER — DOCUMENTATION ONLY (OUTPATIENT)
Dept: SLEEP MEDICINE | Facility: HOSPITAL | Age: 70
End: 2025-02-27
Attending: FAMILY MEDICINE
Payer: MEDICARE

## 2025-02-27 DIAGNOSIS — Z95.1 S/P CABG X 3: ICD-10-CM

## 2025-02-27 DIAGNOSIS — G47.33 OSA (OBSTRUCTIVE SLEEP APNEA): ICD-10-CM

## 2025-02-27 DIAGNOSIS — I25.810 CORONARY ARTERY DISEASE INVOLVING AUTOLOGOUS ARTERY CORONARY BYPASS GRAFT WITHOUT ANGINA PECTORIS: ICD-10-CM

## 2025-02-27 DIAGNOSIS — I10 ESSENTIAL HYPERTENSION: ICD-10-CM

## 2025-02-27 PROCEDURE — 95800 SLP STDY UNATTENDED: CPT

## 2025-02-27 NOTE — PROGRESS NOTES
WatchPAT data has been received and has been scored using rule 1B, 4%. Patient to follow up with provider to determine appropriate therapy.    Pat AHI: 43.9    Ordering Provider: Dr Jamaal Valdivia      Sleep Technician/Technologist: Sandie RAMOS

## 2025-04-11 NOTE — PROGRESS NOTES
Kirk John is a 69 year old male who is being evaluated via in-person clinic visit.       Visit Details     In-clinic visit for review of home sleep testing results.     A/P:     1.)  Severe ANGEL with sleep-associated hypoxemia  2.)  HTN - stable     Prior abnormal overnight oximetry at [facility] in May 2023 with KEITH ~33 and time of SpO2 below 89% of 33 minutes performed with supplemental oxygen at 1 L/min.     Overall, does appear that severity of hypoxemia has improved given that he is now status post carotid endarterectomy, coronary artery bypass graft and interim weight loss of 30-40 pounds.     I would still recommend start of treatment to reduce long-term cardiovascular risk factors.    Recent home sleep testing with use of oral appliance unfortunately did not show a significant benefit with severe ANGEL with pAHI 43.9 and time of SpO2 less than or equal to 88% of 28.1 minutes.    Given this, I did give a strong recommendation for start of CPAP and he is open to this today.  When traveling, we discussed that I would recommend CPAP, though there may still be some benefit from his mandibular advancement device.   we did verify that he is currently on a 9 mm jaw advancement setting and he is finding to be approaching the maximal amount that he can tolerate.     We also discussed weight management, he is planned to work on this with lifestyle modification, though I did briefly mention the recent FDA approval for his appetite as a pound for moderate to severe ANGEL in the setting of obesity.       Our plan for today:  We will proceed with a WatchPAT home sleep test with use of his oral appliance.  Overall, if AHI is less than 15 and of there is normalization of nocturnal hypoxemia we will feel happy with this treatment.  Otherwise we may need to discuss start of CPAP.    SUBJECTIVE:  Kirk John is a 69 year old male.    Pertinent PMHx of ANGEL, HLD, pre-diabetes, CAD s/p CABG x3, HTN, rheumatoid arthritis.     I  "did find one CRISTAL performed at [place] on 5/1/2023 on oxygen at 1 LPM.  Mean SpO2 95%, jass 71% with repetitive desats suggestive of ANGEL.     Prior Sleep Testing:  3/5/2024 - WatchPAT HST with weight 229 lbs, BMI 32.7.  pAHI 32.9.  sustained hypoxemia was not present. Mean oxygen saturation was 93%. Minimum was 66%. Time with saturation less than 88% was 8.9 minutes.     2/12/2024 -we reviewed his interim history, including his time at [facility] around the time of TIA, carotid endarterectomy, and subsequent coronary artery bypass graft.  He was not discharged on oxygen that he recalls.     Since his interim weight loss, his snoring is very intermittent, no observed apnea.     Chief concern per questionnaire: \"Concern re drop in blood oxygen level while in [place] ICU following R carotid artery angioplasty with stent. RT Pulse Oximetry, Overnight done and on file via access from [place] in MyChart. \"     Duration of symptoms:  \"Unknown until asked in [facility] ICU on 5/1/23 if I had ever been told I had sleep apnea. \"     Goals for visit per questionnaire: \"To find out if I really have sleep apnea and/or need a C Pap machine. \"     Sleep pattern:  Workdays.  10:30pm to 4:30-5:30am with goal of 6am  Weekends.  similar.  Time to fall asleep: ~10 minutes.  Awakenings: 4-5 times per night, 15 minutes to return to sleep.  Average total sleep time:  6.25 hours  Napping. 1-2 days per week, ? hours per nap.     No for RLS screen.  No for sleep walking.  No for dream enactment behavior.  No for bruxism.     No for morning headaches.  No for snoring.  No for observed apnea.  Yes for FHx of ANGEL - 2x brothers.     Caffeine use:  Yes for 3+ per day.  No for within 6 hours of bed.     A/P for WatchPAT given suspicion for improvement in hypoxemia but still high risk for ANGEL.     4/8/2024 -reviewed his home sleep test results in detail.     A/P for treatment with combination with ongoing weight loss and oral appliance.     10/14/2024 -in " "general, he feels that the oral appliance has been comfortable and working well.  He did not have significant snoring initially, still unsure if there is been any changes here.  But he does feel that there is been less daytime sleepiness and he no longer feels the need to take a regular nap.  He has done further advancement of his oral appliance is up to a band size of 19.    A/P for watchPAT with use of oral appliance.    Today -reviewed his home sleep test results in detail prior to the home sleep test with his mouth care, he did do a final advancement to a total mandibular advancement of 9 mm and previously would have been on 8 mm.  He is more motivated to fully treat the ANGEL after he had a good friend passed away last week from cardiovascular disease.    WatchPAT - HOME SLEEP STUDY INTERPRETATION     Patient: Kirk John  MRN: [id number]  YOB: 1955  Study Date: 2/25/2025  Referring Provider: Erica Jeronimo  Ordering Provider: Jamaal Valdivia MD, MD     Chain of custody patient verification was not enabled.       Indications for Home Study: Kirk John is a 69 year old male with a history of ANGEL, HLD, pre-diabetes, CAD s/p CABG x3, HTN, rheumatoid arthritis  who presents with desire to assess efficacy of oral appliance.     Prior Sleep Testing:  3/5/2024 - WatchPAT HST with weight 229 lbs, BMI 32.7.  pAHI 32.9.  sustained hypoxemia was not present. Mean oxygen saturation was 93%. Minimum was 66%. Time with saturation less than 88% was 8.9 minutes.      Estimated body mass index is 34.73 kg/m  as calculated from the following:    Height as of 1/8/25: 1.784 m (5' 10.25\").    Weight as of 1/8/25: 110.6 kg (243 lb 12.8 oz).     Data: A full night home sleep study was performed recording the standard physiologic parameters including peripheral arterial tonometry (PAT), sound/snoring, body position,  movement, sound, and oxygen saturation by pulse oximetry. Pulse rate was estimated by " oximetry recording. Sleep staging (wake, REM, light, and deep sleep) was derived from PAT signal.  This study was considered adequate based on > 4 hours of quality oximetry and respiratory recording. As specified by the AASM Manual for the Scoring of Sleep and Associated events, version 2.3, Rule VIII.D 1B, 4% oxygen desaturation scoring for hypopneas is used as a standard of care on all home sleep apnea testing.     Total Recording Time: 7 hrs, 38 min  Total Sleep Time: 6 hrs, 36 min  % of Sleep Time REM: 11.5%     Respiratory:  Snoring: Snoring was present.  Respiratory events: The PAT respiratory disturbance index [pRDI] was 44.3 events per hour.  The PAT apnea/hypopnea index [pAHI] was 43.9 events per hour.  KEITH was 41.4 events per hour.  During REM sleep the pAHI was 35.7.  Sleep Associated Hypoxemia: sustained hypoxemia was not present. Mean oxygen saturation was 92%.  Minimum was 80%.  Time with saturation less than 88% was 28.1 minutes.     Heart Rate: By pulse oximetry normal rate was noted with mean HR 64.  Per cardiac rhythm analysis, no significant atrial fibrillation or premature beats detected.      Position: Percent of time spent: supine - 7.1%, prone - 0%, on right - 71.5%, on left - 21.5%.  pAHI was 69.5 per hour supine, - per hour prone, 41.4 per hour on right side, and 44.8 per hour on left side.      Assessment:   Severe obstructive sleep apnea.  Sleep associated hypoxemia was present.     Recommendations:  Appears that oral appliance is currently not effective for treatment of ANGEL.  Given severe ANGEL with sleep-associated hypoxemia, would recommend start of CPAP.  Suggest optimizing sleep hygiene and avoiding sleep deprivation.  Weight management.     Diagnosis Code(s): Obstructive Sleep Apnea G47.33, Hypoxemia G47.36     Jamaal Valdivia MD, MD, March 3, 2025   Diplomate, American Board of Family Medicine, Sleep Medicine    Past medical history:    Patient Active Problem List    Diagnosis  Date Noted    Class 1 obesity due to excess calories with serious comorbidity and body mass index (BMI) of 32.0 to 32.9 in adult 01/08/2025     Priority: Medium    Erectile dysfunction, unspecified erectile dysfunction type 01/08/2025     Priority: Medium    Tear of left supraspinatus tendon 09/30/2024     Priority: Medium    ANGEL (obstructive sleep apnea) 01/04/2024     Priority: Medium    Essential hypertension 11/04/2023     Priority: Medium    Coronary artery disease involving autologous artery coronary bypass graft without angina pectoris 11/04/2023     Priority: Medium    Disorder involving thrombocytopenia 11/01/2023     Priority: Medium    Rheumatoid arthritis involving multiple sites with positive rheumatoid factor (H) 10/29/2023     Priority: Medium    S/P CABG x 3 (9/22/2023) 10/11/2023     Priority: Medium    History of coronary artery stent placement (2012) 10/11/2023     Priority: Medium    S/P patent foramen ovale closure (9/22/2023) 10/11/2023     Priority: Medium    Hyperlipidemia LDL goal <70 10/11/2023     Priority: Medium    Postoperative atrial fibrillation (H) 09/22/2023     Priority: Medium    Obstructive sleep apnea syndrome in adult 09/18/2023     Priority: Medium    Patent foramen ovale 09/18/2023     Priority: Medium    Personal history of tobacco use, presenting hazards to health 09/18/2023     Priority: Medium    Chronic left shoulder pain 09/14/2023     Priority: Medium    Traumatic incomplete tear of left rotator cuff, initial encounter 09/14/2023     Priority: Medium    Chronic anticoagulation 05/08/2023     Priority: Medium    Internal carotid artery stent present 05/08/2023     Priority: Medium    H/O amaurosis fugax 05/08/2023     Priority: Medium    Presence of other vascular implants and grafts 05/08/2023     Priority: Medium    History of right common carotid artery stent placement 05/02/2023     Priority: Medium    CKD (chronic kidney disease) stage 2, GFR 60-89 ml/min  12/14/2022     Priority: Medium    Degeneration of intervertebral disc 02/01/2018     Priority: Medium     Overview:   With back pain      Psychosexual dysfunction with inhibited sexual excitement 02/01/2018     Priority: Medium    Hypercholesterolemia 02/01/2018     Priority: Medium    Prediabetes 02/01/2018     Priority: Medium    Diverticulosis of sigmoid colon 01/25/2016     Priority: Medium    Health care maintenance 01/21/2016     Priority: Medium    Coronary atherosclerosis 09/08/2013     Priority: Medium     Overview:   August 2012 PRAVIN stent placed at [facility] in [place]. Moderate diffuse disease in LAD and Circumflex.       Hip pain 07/03/2012     Priority: Medium    Tick bite 04/25/2011     Priority: Medium       10 point ROS of systems including Constitutional, Eyes, Respiratory, Cardiovascular, Gastroenterology, Genitourinary, Integumentary, Muscularskeletal, Psychiatric were all negative except for pertinent positives noted in my HPI.    Current Outpatient Medications   Medication Sig Dispense Refill    acetaminophen (TYLENOL) 500 MG tablet Take 500-1,000 mg by mouth every 6 hours as needed for mild pain Take at bedtime as needed for shoulder pain      aspirin 81 MG EC tablet Take 81 mg by mouth daily      blood glucose (NO BRAND SPECIFIED) lancets standard Use to test blood sugar 1 time daily 100 Lancet 3    blood glucose (NO BRAND SPECIFIED) test strip Use to test blood sugar 1 times daily. Dx: R73.03 100 strip 3    blood glucose monitoring (ACCU-CHEK ENEDINA PLUS) meter device kit Use to test blood sugar 1 times daily. Dx: R73.03 1 kit 0    blood glucose monitoring (ACCU-CHEK MULTICLIX) lancets Use to test blood sugar 1 times daily. Dx: R73.03 102 each 3    blood glucose monitoring (NO BRAND SPECIFIED) meter device kit Use to test blood sugar 1 time daily 1 kit 0    cholecalciferol (VITAMIN D3) 125 mcg (5000 units) capsule Take 125 mcg by mouth daily      furosemide (LASIX) 20 MG tablet TAKE 1 TABLET BY MOUTH  IN THE MORNING ONLY IF NEEDED FOR INCREASED EDEMA IN LEGS. 90 tablet 1    lisinopril (ZESTRIL) 5 MG tablet Take 1 tablet (5 mg) by mouth daily. 90 tablet 4    metoprolol succinate ER (TOPROL XL) 25 MG 24 hr tablet Take 0.5 tablets (12.5 mg) by mouth daily. 45 tablet 4    Multiple Vitamins-Minerals (MULTIVITAMIN & MINERAL PO) Take 1 tablet by mouth      nitroGLYcerin (NITROSTAT) 0.4 MG sublingual tablet PLACE 1 TABLET UNDER THE TONGUE EVERY 5 MINUTES IF NEEDED FOR CHEST PAIN. (Patient not taking: Reported on 1/8/2025) 25 tablet 0    potassium chloride taco ER (KLOR-CON M10) 10 MEQ CR tablet TAKE 2 TABS BY MOUTH ON DAYS YOU TAKE LASIX 180 tablet 1    rosuvastatin (CRESTOR) 40 MG tablet Take 1 tablet (40 mg) by mouth daily. 90 tablet 4    sildenafil (VIAGRA) 50 MG tablet Take 1 tablet (50 mg) by mouth daily as needed (Erectile dysfunction). 10 tablet 11       OBJECTIVE:  There were no vitals taken for this visit.    Physical Exam     ---  This note was written with the assistance of the Dragon voice-dictation technology software. The final document, although reviewed, may contain errors. For corrections, please contact the office.    Total time spent preparing to see the patient, review of chart, obtaining history and physical examination, review of sleep testing, review of treatment options, education, discussion with patient and documenting in Epic / EMR was 30 minutes.  All time involved was spent on the day of service for the patient (the same day as the patient's appointment).    Jamaal Valdivia MD    Sleep Medicine  [place], [place]  Main Office: [phone]  [facility] [place], [place]  [address], [address]  Schedule visits: [phone]  Main Office: [phone]  Fax: [phone]

## 2025-04-14 ENCOUNTER — OFFICE VISIT (OUTPATIENT)
Dept: FAMILY MEDICINE | Facility: OTHER | Age: 70
End: 2025-04-14
Attending: FAMILY MEDICINE
Payer: MEDICARE

## 2025-04-14 DIAGNOSIS — G47.33 OSA (OBSTRUCTIVE SLEEP APNEA): Primary | ICD-10-CM

## 2025-04-14 DIAGNOSIS — I10 ESSENTIAL HYPERTENSION: ICD-10-CM

## 2025-04-14 PROCEDURE — G0463 HOSPITAL OUTPT CLINIC VISIT: HCPCS

## 2025-04-17 DIAGNOSIS — G47.33 OSA (OBSTRUCTIVE SLEEP APNEA): ICD-10-CM

## 2025-04-17 DIAGNOSIS — I10 ESSENTIAL HYPERTENSION: Primary | ICD-10-CM

## 2025-04-24 ENCOUNTER — TELEPHONE (OUTPATIENT)
Dept: PULMONOLOGY | Facility: OTHER | Age: 70
End: 2025-04-24

## 2025-04-30 ENCOUNTER — MYC MEDICAL ADVICE (OUTPATIENT)
Dept: FAMILY MEDICINE | Facility: OTHER | Age: 70
End: 2025-04-30
Payer: COMMERCIAL

## 2025-04-30 NOTE — TELEPHONE ENCOUNTER
Please print 4/17/25 Comprehensive DME CPAP order    Mail to:     Kirk John  [address] [address] [facility]  [id number]    Niharika Alonzo RN on 4/30/2025 at 3:34 PM

## 2025-05-05 DIAGNOSIS — I10 ESSENTIAL HYPERTENSION: ICD-10-CM

## 2025-05-06 RX ORDER — NITROGLYCERIN 0.4 MG/1
TABLET SUBLINGUAL
Qty: 25 TABLET | Refills: 0 | Status: SHIPPED | OUTPATIENT
Start: 2025-05-06

## 2025-05-30 DIAGNOSIS — Z95.1 S/P CABG X 3: ICD-10-CM

## 2025-06-02 RX ORDER — FUROSEMIDE 20 MG/1
TABLET ORAL
Qty: 90 TABLET | Refills: 1 | Status: SHIPPED | OUTPATIENT
Start: 2025-06-02

## 2025-06-02 NOTE — TELEPHONE ENCOUNTER
Requested Prescriptions   Pending Prescriptions Disp Refills    furosemide (LASIX) 20 MG tablet [Pharmacy Med Name: FUROSEMIDE 20 MG TABLET] 90 tablet 1     Sig: TAKE 1 TABLET BY MOUTH IN THE MORNING ONLY IF NEEDED FOR INCREASED EDEMA IN LEGS.       Diuretics (Including Combos) Protocol Failed - 6/2/2025  9:47 AM        Failed - Medication indicated for associated diagnosis     Medication is associated with one or more of the following diagnoses:     Edema   Hypertension   Hypercalcemia   Heart Failure   Chronic Kidney Disease (CKD)   Cardiomyopathy   Dyspnea   Chronic Thromboembolic Pulmonary Hypertension   Pulmonary Hypertension        Passed - Most recent blood pressure under 140/90 in past 12 months     BP Readings from Last 3 Encounters:   01/08/25 128/70   12/12/24 116/64   09/23/24 129/83   No data recorded        Passed - Potassium level on file in past 12 months        Passed - Medication is active on med list and the sig matches. RN to manually verify dose and sig if red X/fail.     If the protocol passes (green check), you do not need to verify med dose and sig.    A prescription matches if they are the same clinical intention.    For Example: once daily and every morning are the same.    The protocol can not identify upper and lower case letters as matching and will fail.     For Example: Take 1 tablet (50 mg) by mouth daily     TAKE 1 TABLET (50 MG) BY MOUTH DAILY    For all fails (red x), verify dose and sig.    If the refill does match what is on file, the RN can still proceed to approve the refill request.     If they do not match, route to the appropriate provider.        Passed - Has GFR on file in past 12 months and most recent value is normal        Passed - Recent (12 month) or future (90 days) visit with authorizing provider's specialty (provided they have been seen in the past 15 months)     The patient must have completed an in-person or virtual visit within the past 12 months or has a future  visit scheduled within the next 90 days with the authorizing provider s specialty.  Urgent care and e-visits do not qualify as an office visit for this protocol.        Passed - Patient is age 18 or older     Last Written Prescription Date:  12/4/24  Last Fill Quantity: 90,   # refills: 1    Last Office Visit: 12/12/24  Future Office visit:   none    Routing refill request to provider for review/approval because:  Failed - Medication indicated for associated diagnosis    Unable to complete prescription refill per RN Medication Refill Policy.   Francine Jimenez RN ....................  6/2/2025   9:48 AM

## 2025-07-27 NOTE — PROGRESS NOTES
"Kirk John is a 69 year old male who is being evaluated via a billable video visit.       The patient has been notified of following:      \"This video visit will be conducted via a call between you and your physician/provider. We have found that certain health care needs can be provided without the need for an in-person physical exam.  This service lets us provide the care you need with a video conversation.  If a prescription is necessary we can send it directly to your pharmacy.  If lab work is needed we can place an order for that and you can then stop by our lab to have the test done at a later time.     Video visits are billed at different rates depending on your insurance coverage.  Please reach out to your insurance provider with any questions.     If during the course of the call the physician/provider feels a video visit is not appropriate, you will not be charged for this service.\"     Patient has given verbal consent for Video visit? Yes  How would you like to obtain your AVS? Mail a copy  If you are dropped from the video visit, the video invite should be resent to: Text to cell phone: -  Will anyone else be joining your video visit? No  If patient encounters technical issues they should call [phone]      Video-Visit Details     Type of service:  Video Visit     Start Time: 0900  End Time: 0915    Originating Location (pt. Location): Home     Distant Location (provider location):  [facility]       Platform used for Video Visit: Epuls    Virtual visit for CPAP compliance follow-up.     A/P:     1.)  Severe ANGEL with sleep-associated hypoxemia  2.)  HTN - stable     Prior abnormal overnight oximetry at [facility] in May 2023 with KEITH ~33 and time of SpO2 below 89% of 33 minutes performed with supplemental oxygen at 1 L/min.     Overall, does appear that severity of hypoxemia has improved given that he is now status post carotid endarterectomy, coronary artery bypass " "graft and interim weight loss of 30-40 pounds.     I would still recommend start of treatment to reduce long-term cardiovascular risk factors.     Recent home sleep testing with use of oral appliance unfortunately did not show a significant benefit with severe ANGEL with pAHI 43.9 and time of SpO2 less than or equal to 88% of 28.1 minutes.     CPAP appeared overall effective and adequate usage on nights worn with auto-titrate 5-15 cm H2O.  He did previously fully meet compliance in May.  The mildly elevated AHI appears to be secondary to presumed treatment related central apneas, with obstructive apnea being less than 5.     Our plan for today:  Continue CPAP auto titrate 5-15 cm H2O.  Plan for routine follow-up in 1 year.    SUBJECTIVE:  Kirk John is a 69 year old male.    Pertinent PMHx of ANGEL, HLD, pre-diabetes, CAD s/p CABG x3, HTN, rheumatoid arthritis.     I did find one CRISTAL performed at [facility] on 5/1/2023 on oxygen at 1 LPM.  Mean SpO2 95%, jass 71% with repetitive desats suggestive of ANGEL.     Prior Sleep Testing:  3/5/2024 - WatchPAT HST with weight 229 lbs, BMI 32.7.  pAHI 32.9.  sustained hypoxemia was not present. Mean oxygen saturation was 93%. Minimum was 66%. Time with saturation less than 88% was 8.9 minutes.   2/25/2025 - WatchPAT HST with weight 243 lbs, BMI 34.7.  With use of oral appliance.  pAHI 43.9.  sustained hypoxemia was not present. Mean oxygen saturation was 92%. Minimum was 80%. Time with saturation less than 88% was 28.1 minutes.     2/12/2024 -we reviewed his interim history, including his time at [facility] around the time of TIA, carotid endarterectomy, and subsequent coronary artery bypass graft.  He was not discharged on oxygen that he recalls.     Since his interim weight loss, his snoring is very intermittent, no observed apnea.     Chief concern per questionnaire: \"Concern re drop in blood oxygen level while in [facility] ICU following R carotid artery angioplasty with stent. RT " "Pulse Oximetry, Overnight done and on file via access from [facility] in MyChart. \"     Duration of symptoms:  \"Unknown until asked in [facility] ICU on 5/1/23 if I had ever been told I had sleep apnea. \"     Goals for visit per questionnaire: \"To find out if I really have sleep apnea and/or need a C Pap machine. \"     Sleep pattern:  Workdays.  10:30pm to 4:30-5:30am with goal of 6am  Weekends.  similar.  Time to fall asleep: ~10 minutes.  Awakenings: 4-5 times per night, 15 minutes to return to sleep.  Average total sleep time:  6.25 hours  Napping. 1-2 days per week, ? hours per nap.     No for RLS screen.  No for sleep walking.  No for dream enactment behavior.  No for bruxism.     No for morning headaches.  No for snoring.  No for observed apnea.  Yes for FHx of ANGEL - 2x brothers.     Caffeine use:  Yes for 3+ per day.  No for within 6 hours of bed.     A/P for WatchPAT given suspicion for improvement in hypoxemia but still high risk for ANGEL.     4/8/2024 -reviewed his home sleep test results in detail.     A/P for treatment with combination with ongoing weight loss and oral appliance.     10/14/2024 -in general, he feels that the oral appliance has been comfortable and working well.  He did not have significant snoring initially, still unsure if there is been any changes here.  But he does feel that there is been less daytime sleepiness and he no longer feels the need to take a regular nap.  He has done further advancement of his oral appliance is up to a band size of 19.     Our plan for today:  We will proceed with a WatchPAT home sleep test with use of his oral appliance.  Overall, if AHI is less than 15 and of there is normalization of nocturnal hypoxemia we will feel happy with this treatment.  Otherwise we may need to discuss start of CPAP.    10/14/2024 -in general, he feels that the oral appliance has been comfortable and working well.  He did not have significant snoring initially, still unsure if there is been " any changes here.  But he does feel that there is been less daytime sleepiness and he no longer feels the need to take a regular nap.  He has done further advancement of his oral appliance is up to a band size of 19.     A/P for watchPAT with use of oral appliance.     4/14/2025 -reviewed his home sleep test results in detail prior to the home sleep test with his mouth care, he did do a final advancement to a total mandibular advancement of 9 mm and previously would have been on 8 mm.  He is more motivated to fully treat the ANGEL after he had a good friend passed away last week from cardiovascular disease.    Our plan for today:  We will proceed with a WatchPAT home sleep test with use of his oral appliance after final advancement of MAD.  Overall, if AHI is less than 15 and of there is normalization of nocturnal hypoxemia we will feel happy with this treatment.  Otherwise we may need to discuss start of CPAP.    Today - He did start CPAP following our last visit.  Overall, he feels that the CPAP has gone incredibly well and is very happy with it.  He has fully met compliance, though over the last 2 months there was intermittent usage due to his trip to [place] where they did not consistently have power or space to use CPAP.    He otherwise feels he is sleeping very well, feeling much more well rested.  He is using a nasal pillows mask.    CPAP download reviewed from 6/27/2025 - 7/26/2025 on auto-titrate 5-15 cm H2O.  Used 7 of 30 days, average usage on days used of 6 hours 43 minutes.  Pressure median 8, 95th%ile 10.9 cm H2O.  AHI 8.1 with AI 7.3 (TIMOTEO 4.7, OAI 2.4, unknown 0.1).    Past medical history:    Patient Active Problem List    Diagnosis Date Noted    Class 1 obesity due to excess calories with serious comorbidity and body mass index (BMI) of 32.0 to 32.9 in adult 01/08/2025     Priority: Medium    Erectile dysfunction, unspecified erectile dysfunction type 01/08/2025     Priority: Medium    Tear of left  supraspinatus tendon 09/30/2024     Priority: Medium    ANGEL (obstructive sleep apnea) 01/04/2024     Priority: Medium    Essential hypertension 11/04/2023     Priority: Medium    Coronary artery disease involving autologous artery coronary bypass graft without angina pectoris 11/04/2023     Priority: Medium    Disorder involving thrombocytopenia 11/01/2023     Priority: Medium    Rheumatoid arthritis involving multiple sites with positive rheumatoid factor (H) 10/29/2023     Priority: Medium    S/P CABG x 3 (9/22/2023) 10/11/2023     Priority: Medium    History of coronary artery stent placement (2012) 10/11/2023     Priority: Medium    S/P patent foramen ovale closure (9/22/2023) 10/11/2023     Priority: Medium    Hyperlipidemia LDL goal <70 10/11/2023     Priority: Medium    Postoperative atrial fibrillation (H) 09/22/2023     Priority: Medium    Obstructive sleep apnea syndrome in adult 09/18/2023     Priority: Medium    Patent foramen ovale 09/18/2023     Priority: Medium    Personal history of tobacco use, presenting hazards to health 09/18/2023     Priority: Medium    Chronic left shoulder pain 09/14/2023     Priority: Medium    Traumatic incomplete tear of left rotator cuff, initial encounter 09/14/2023     Priority: Medium    Chronic anticoagulation 05/08/2023     Priority: Medium    Internal carotid artery stent present 05/08/2023     Priority: Medium    H/O amaurosis fugax 05/08/2023     Priority: Medium    Presence of other vascular implants and grafts 05/08/2023     Priority: Medium    History of right common carotid artery stent placement 05/02/2023     Priority: Medium    CKD (chronic kidney disease) stage 2, GFR 60-89 ml/min 12/14/2022     Priority: Medium    Degeneration of intervertebral disc 02/01/2018     Priority: Medium     Overview:   With back pain      Psychosexual dysfunction with inhibited sexual excitement 02/01/2018     Priority: Medium    Hypercholesterolemia 02/01/2018     Priority:  Medium    Prediabetes 02/01/2018     Priority: Medium    Diverticulosis of sigmoid colon 01/25/2016     Priority: Medium    Health care maintenance 01/21/2016     Priority: Medium    Coronary atherosclerosis 09/08/2013     Priority: Medium     Overview:   August 2012 PRAVIN stent placed at [facility] in [place]. Moderate diffuse disease in LAD and Circumflex.       Hip pain 07/03/2012     Priority: Medium    Tick bite 04/25/2011     Priority: Medium       10 point ROS of systems including Constitutional, Eyes, Respiratory, Cardiovascular, Gastroenterology, Genitourinary, Integumentary, Muscularskeletal, Psychiatric were all negative except for pertinent positives noted in my HPI.    Current Outpatient Medications   Medication Sig Dispense Refill    acetaminophen (TYLENOL) 500 MG tablet Take 500-1,000 mg by mouth every 6 hours as needed for mild pain Take at bedtime as needed for shoulder pain      aspirin 81 MG EC tablet Take 81 mg by mouth daily      blood glucose (NO BRAND SPECIFIED) lancets standard Use to test blood sugar 1 time daily 100 Lancet 3    blood glucose (NO BRAND SPECIFIED) test strip Use to test blood sugar 1 times daily. Dx: R73.03 100 strip 3    blood glucose monitoring (ACCU-CHEK ENEDINA PLUS) meter device kit Use to test blood sugar 1 times daily. Dx: R73.03 1 kit 0    blood glucose monitoring (ACCU-CHEK MULTICLIX) lancets Use to test blood sugar 1 times daily. Dx: R73.03 102 each 3    blood glucose monitoring (NO BRAND SPECIFIED) meter device kit Use to test blood sugar 1 time daily 1 kit 0    cholecalciferol (VITAMIN D3) 125 mcg (5000 units) capsule Take 125 mcg by mouth daily      furosemide (LASIX) 20 MG tablet TAKE 1 TABLET BY MOUTH IN THE MORNING ONLY IF NEEDED FOR INCREASED EDEMA IN LEGS. 90 tablet 1    lisinopril (ZESTRIL) 5 MG tablet Take 1 tablet (5 mg) by mouth daily. 90 tablet 4    metoprolol succinate ER (TOPROL XL) 25 MG 24 hr tablet Take 0.5 tablets (12.5 mg) by mouth daily. 45 tablet 4     Multiple Vitamins-Minerals (MULTIVITAMIN & MINERAL PO) Take 1 tablet by mouth      nitroGLYcerin (NITROSTAT) 0.4 MG sublingual tablet PLACE 1 TABLET UNDER THE TONGUE EVERY 5 MINUTES IF NEEDED FOR CHEST PAIN. 25 tablet 0    potassium chloride taco ER (KLOR-CON M10) 10 MEQ CR tablet TAKE 2 TABS BY MOUTH ON DAYS YOU TAKE LASIX 180 tablet 1    rosuvastatin (CRESTOR) 40 MG tablet Take 1 tablet (40 mg) by mouth daily. 90 tablet 4    sildenafil (VIAGRA) 50 MG tablet Take 1 tablet (50 mg) by mouth daily as needed (Erectile dysfunction). 10 tablet 11       OBJECTIVE:  There were no vitals taken for this visit.    Physical Exam     ---  This note was written with the assistance of the Dragon voice-dictation technology software. The final document, although reviewed, may contain errors. For corrections, please contact the office.    Total time spent preparing to see the patient, review of chart, obtaining history and physical examination, review of sleep testing, review of treatment options, education, discussion with patient and documenting in Epic / EMR was 25 minutes.  All time involved was spent on the day of service for the patient (the same day as the patient's appointment).    Jamaal Valdivia MD    Sleep Medicine  [place], [place]  Main Office: [phone]  [facility] [place], [place]  [address] [facility] [address], [address]  Schedule visits: [phone]  Main Office: [phone]  Fax: [phone]

## 2025-07-28 NOTE — PROGRESS NOTES
CPAP Compliance Visit:       Name: Kirk John MRN# [id number]   Age: 69 year old YOB: 1955     Date of Consultation: July 28, 2025  Primary care provider: Erica Jeronimo    Compliance:   27 % of days with >4 hours of use.   22 days/30 with no use   Average use: 6 hours 47 minutes per day   95 %ile leak 21.0 L/min   CPAP 95% pressure 5-15 cm   AHI 7.7 events/hour   TIMOTEO 4.3  PB 3%     Impression:   Patient is {CPAP Compliance :006698}

## 2025-07-29 ENCOUNTER — VIRTUAL VISIT (OUTPATIENT)
Dept: PULMONOLOGY | Facility: OTHER | Age: 70
End: 2025-07-29
Attending: FAMILY MEDICINE
Payer: COMMERCIAL

## 2025-07-29 ENCOUNTER — TELEPHONE (OUTPATIENT)
Dept: SLEEP MEDICINE | Facility: HOSPITAL | Age: 70
End: 2025-07-29

## 2025-07-29 VITALS — WEIGHT: 230 LBS | HEIGHT: 70 IN | BODY MASS INDEX: 32.93 KG/M2

## 2025-07-29 DIAGNOSIS — I10 ESSENTIAL HYPERTENSION: Primary | ICD-10-CM

## 2025-07-29 DIAGNOSIS — G47.33 OSA (OBSTRUCTIVE SLEEP APNEA): ICD-10-CM

## 2025-07-29 ASSESSMENT — PAIN SCALES - GENERAL: PAINLEVEL_OUTOF10: NO PAIN (0)

## 2025-07-29 NOTE — PROGRESS NOTES
"Virtual Visit Details    Type of service:  Video Visit   Video Start Time: {video visit start/end time for provider to select:384600}  Video End Time:{video visit start/end time for provider to select:562092}    Originating Location (pt. Location): {video visit patient location:167799::\"Home\"}  {PROVIDER LOCATION On-site should be selected for visits conducted from your clinic location or adjoining [facility] hospital, academic office, or other nearby [facility] building. Off-site should be selected for all other provider locations, including home:775985}  Distant Location (provider location):  {virtual location provider:802493}  Platform used for Video Visit: {Virtual Visit Platforms:261842::\"[employer]\"}    "

## 2025-07-29 NOTE — NURSING NOTE
Current patient location: [address] [place] [id number]    Is the patient currently in the state of [place]? YES    Visit mode: VIDEO    If the visit is dropped, the patient can be reconnected by:VIDEO VISIT: Text to cell phone:   Telephone Information:   Mobile [phone]       Will anyone else be joining the visit? NO  (If patient encounters technical issues they should call [phone])    Are changes needed to the allergy or medication list? No    Are refills needed on medications prescribed by this physician? NO    Rooming Documentation:  Questionnaire(s) completed    Reason for visit: RECHECK    Dennys ESPINOZAF

## 2025-07-29 NOTE — Clinical Note
The patient wanted a copy of today's note sent to [facility] for compliance documentation.

## 2025-08-04 DIAGNOSIS — R73.03 PREDIABETES: ICD-10-CM

## (undated) DEVICE — PACK SHOULDER ARTHROSCOPY SOP15SAFCA

## (undated) DEVICE — BUR ARTHREX COOLCUT OVAL 8 FLUTE 4.0MMX13CM AR-8400OBE

## (undated) DEVICE — SUCTION MANIFOLD NEPTUNE 2 SYS 4 PORT 0702-020-000

## (undated) DEVICE — NDL SPINAL 18GA 3.5" 405184

## (undated) DEVICE — GLOVE PROTEXIS POWDER FREE SMT 8.5 2D72PT85X

## (undated) DEVICE — DRAPE STERI U 1015

## (undated) DEVICE — Device

## (undated) DEVICE — DRSG ABDOMINAL PAD UNSTERILE 5X9" 9190

## (undated) DEVICE — GLOVE BIOGEL PI SZ 8.5 40885

## (undated) DEVICE — TUBING SET ARTHREX DUALWAVE OUTFLOW AR-6430

## (undated) DEVICE — KIT SHOULDER POSITIONING BEACH CHAIR ARM HOLDER AR-1644

## (undated) DEVICE — TUBING ARTHROSCOPY PUMP ARTHREX AR-6410

## (undated) DEVICE — SOL WATER 1500ML

## (undated) DEVICE — DRAPE ARTHROSCOPY SHOULDER BEACHCHAIR 29369

## (undated) DEVICE — DRAPE STERI TOWEL LG 1010

## (undated) DEVICE — DRSG GAUZE 4X4" TRAY 6939

## (undated) DEVICE — SU ETHILON 3-0 PS-2 18" 1669H

## (undated) DEVICE — SLEEVE COMPRESSION SCD KNEE MED 74022

## (undated) DEVICE — DRAPE U-SHAPED ORTHOARTS 60X70" 89331

## (undated) DEVICE — PREP CHLORAPREP 26ML TINTED ORANGE  260815

## (undated) DEVICE — BUR ARTHREX COOLCUT EXCALIBUR 4.0MMX13CM AR-8400EX

## (undated) RX ORDER — EPHEDRINE SULFATE 50 MG/ML
INJECTION, SOLUTION INTRAMUSCULAR; INTRAVENOUS; SUBCUTANEOUS
Status: DISPENSED
Start: 2024-09-23

## (undated) RX ORDER — FENTANYL CITRATE 50 UG/ML
INJECTION, SOLUTION INTRAMUSCULAR; INTRAVENOUS
Status: DISPENSED
Start: 2024-09-23

## (undated) RX ORDER — GLYCOPYRROLATE 0.2 MG/ML
INJECTION, SOLUTION INTRAMUSCULAR; INTRAVENOUS
Status: DISPENSED
Start: 2024-09-23

## (undated) RX ORDER — ONDANSETRON 2 MG/ML
INJECTION INTRAMUSCULAR; INTRAVENOUS
Status: DISPENSED
Start: 2024-09-23

## (undated) RX ORDER — BUPIVACAINE HYDROCHLORIDE 5 MG/ML
INJECTION, SOLUTION EPIDURAL; INTRACAUDAL
Status: DISPENSED
Start: 2024-09-23

## (undated) RX ORDER — PROPOFOL 10 MG/ML
INJECTION, EMULSION INTRAVENOUS
Status: DISPENSED
Start: 2024-09-23

## (undated) RX ORDER — EPINEPHRINE 1 MG/ML
INJECTION INTRAMUSCULAR; INTRAVENOUS; SUBCUTANEOUS
Status: DISPENSED
Start: 2024-09-23

## (undated) RX ORDER — DEXAMETHASONE SODIUM PHOSPHATE 4 MG/ML
INJECTION, SOLUTION INTRA-ARTICULAR; INTRALESIONAL; INTRAMUSCULAR; INTRAVENOUS; SOFT TISSUE
Status: DISPENSED
Start: 2024-09-23

## (undated) RX ORDER — FENTANYL CITRATE-0.9 % NACL/PF 10 MCG/ML
PLASTIC BAG, INJECTION (ML) INTRAVENOUS
Status: DISPENSED
Start: 2024-09-23

## (undated) RX ORDER — CEFAZOLIN SODIUM/WATER 2 G/20 ML
SYRINGE (ML) INTRAVENOUS
Status: DISPENSED
Start: 2024-09-23